# Patient Record
Sex: MALE | Race: WHITE | NOT HISPANIC OR LATINO | Employment: OTHER | URBAN - METROPOLITAN AREA
[De-identification: names, ages, dates, MRNs, and addresses within clinical notes are randomized per-mention and may not be internally consistent; named-entity substitution may affect disease eponyms.]

---

## 2019-10-18 ENCOUNTER — HOSPITAL ENCOUNTER (INPATIENT)
Facility: HOSPITAL | Age: 79
LOS: 4 days | Discharge: HOME | End: 2019-10-22
Attending: EMERGENCY MEDICINE | Admitting: HOSPITALIST

## 2019-10-18 ENCOUNTER — APPOINTMENT (OUTPATIENT)
Dept: GENERAL RADIOLOGY | Facility: HOSPITAL | Age: 79
End: 2019-10-18

## 2019-10-18 ENCOUNTER — APPOINTMENT (OUTPATIENT)
Dept: CT IMAGING | Facility: HOSPITAL | Age: 79
End: 2019-10-18

## 2019-10-18 DIAGNOSIS — I50.9 ACUTE CONGESTIVE HEART FAILURE, UNSPECIFIED HEART FAILURE TYPE (HCC): Primary | ICD-10-CM

## 2019-10-18 DIAGNOSIS — I47.29 VENTRICULAR TACHYCARDIA, NON-SUSTAINED (HCC): ICD-10-CM

## 2019-10-18 DIAGNOSIS — R10.30 LOWER ABDOMINAL PAIN: ICD-10-CM

## 2019-10-18 PROBLEM — I10 HTN (HYPERTENSION): Status: ACTIVE | Noted: 2019-10-18

## 2019-10-18 PROBLEM — E78.5 HLD (HYPERLIPIDEMIA): Status: ACTIVE | Noted: 2019-10-18

## 2019-10-18 PROBLEM — R73.03 PREDIABETES: Status: ACTIVE | Noted: 2019-10-18

## 2019-10-18 LAB
ALBUMIN SERPL-MCNC: 4.1 G/DL (ref 3.5–5.2)
ALBUMIN/GLOB SERPL: 1.9 G/DL
ALP SERPL-CCNC: 36 U/L (ref 39–117)
ALT SERPL W P-5'-P-CCNC: 31 U/L (ref 1–41)
ANION GAP SERPL CALCULATED.3IONS-SCNC: 16.8 MMOL/L (ref 5–15)
APTT PPP: 30.5 SECONDS (ref 22.7–35.4)
AST SERPL-CCNC: 32 U/L (ref 1–40)
BASOPHILS # BLD AUTO: 0.05 10*3/MM3 (ref 0–0.2)
BASOPHILS NFR BLD AUTO: 0.6 % (ref 0–1.5)
BILIRUB SERPL-MCNC: 1.1 MG/DL (ref 0.2–1.2)
BILIRUB UR QL STRIP: NEGATIVE
BUN BLD-MCNC: 14 MG/DL (ref 8–23)
BUN/CREAT SERPL: 15.2 (ref 7–25)
CALCIUM SPEC-SCNC: 9 MG/DL (ref 8.6–10.5)
CHLORIDE SERPL-SCNC: 101 MMOL/L (ref 98–107)
CLARITY UR: CLEAR
CO2 SERPL-SCNC: 21.2 MMOL/L (ref 22–29)
COLOR UR: YELLOW
CREAT BLD-MCNC: 0.92 MG/DL (ref 0.76–1.27)
D DIMER PPP FEU-MCNC: 1.15 MCGFEU/ML (ref 0–0.49)
DEPRECATED RDW RBC AUTO: 49 FL (ref 37–54)
EOSINOPHIL # BLD AUTO: 0.05 10*3/MM3 (ref 0–0.4)
EOSINOPHIL NFR BLD AUTO: 0.6 % (ref 0.3–6.2)
ERYTHROCYTE [DISTWIDTH] IN BLOOD BY AUTOMATED COUNT: 14.3 % (ref 12.3–15.4)
GFR SERPL CREATININE-BSD FRML MDRD: 79 ML/MIN/1.73
GLOBULIN UR ELPH-MCNC: 2.2 GM/DL
GLUCOSE BLD-MCNC: 128 MG/DL (ref 65–99)
GLUCOSE UR STRIP-MCNC: NEGATIVE MG/DL
HCT VFR BLD AUTO: 37.6 % (ref 37.5–51)
HGB BLD-MCNC: 12.5 G/DL (ref 13–17.7)
HGB UR QL STRIP.AUTO: NEGATIVE
HOLD SPECIMEN: NORMAL
HOLD SPECIMEN: NORMAL
IMM GRANULOCYTES # BLD AUTO: 0.03 10*3/MM3 (ref 0–0.05)
IMM GRANULOCYTES NFR BLD AUTO: 0.4 % (ref 0–0.5)
INR PPP: 1.25 (ref 0.9–1.1)
KETONES UR QL STRIP: NEGATIVE
LEUKOCYTE ESTERASE UR QL STRIP.AUTO: NEGATIVE
LIPASE SERPL-CCNC: 34 U/L (ref 13–60)
LYMPHOCYTES # BLD AUTO: 1.57 10*3/MM3 (ref 0.7–3.1)
LYMPHOCYTES NFR BLD AUTO: 19.8 % (ref 19.6–45.3)
MAGNESIUM SERPL-MCNC: 1.9 MG/DL (ref 1.6–2.4)
MCH RBC QN AUTO: 31.3 PG (ref 26.6–33)
MCHC RBC AUTO-ENTMCNC: 33.2 G/DL (ref 31.5–35.7)
MCV RBC AUTO: 94.2 FL (ref 79–97)
MONOCYTES # BLD AUTO: 0.6 10*3/MM3 (ref 0.1–0.9)
MONOCYTES NFR BLD AUTO: 7.6 % (ref 5–12)
NEUTROPHILS # BLD AUTO: 5.62 10*3/MM3 (ref 1.7–7)
NEUTROPHILS NFR BLD AUTO: 71 % (ref 42.7–76)
NITRITE UR QL STRIP: NEGATIVE
NRBC BLD AUTO-RTO: 0 /100 WBC (ref 0–0.2)
NT-PROBNP SERPL-MCNC: 3197 PG/ML (ref 5–1800)
PH UR STRIP.AUTO: <=5 [PH] (ref 5–8)
PLATELET # BLD AUTO: 254 10*3/MM3 (ref 140–450)
PMV BLD AUTO: 9.8 FL (ref 6–12)
POTASSIUM BLD-SCNC: 5.1 MMOL/L (ref 3.5–5.2)
PROT SERPL-MCNC: 6.3 G/DL (ref 6–8.5)
PROT UR QL STRIP: NEGATIVE
PROTHROMBIN TIME: 15.4 SECONDS (ref 11.7–14.2)
RBC # BLD AUTO: 3.99 10*6/MM3 (ref 4.14–5.8)
SODIUM BLD-SCNC: 139 MMOL/L (ref 136–145)
SP GR UR STRIP: >=1.03 (ref 1–1.03)
TROPONIN T SERPL-MCNC: <0.01 NG/ML (ref 0–0.03)
TSH SERPL DL<=0.05 MIU/L-ACNC: 4.53 UIU/ML (ref 0.27–4.2)
UROBILINOGEN UR QL STRIP: NORMAL
WBC NRBC COR # BLD: 7.92 10*3/MM3 (ref 3.4–10.8)
WHOLE BLOOD HOLD SPECIMEN: NORMAL
WHOLE BLOOD HOLD SPECIMEN: NORMAL

## 2019-10-18 PROCEDURE — 93005 ELECTROCARDIOGRAM TRACING: CPT | Performed by: PHYSICIAN ASSISTANT

## 2019-10-18 PROCEDURE — 93005 ELECTROCARDIOGRAM TRACING: CPT

## 2019-10-18 PROCEDURE — 85610 PROTHROMBIN TIME: CPT | Performed by: PHYSICIAN ASSISTANT

## 2019-10-18 PROCEDURE — 85730 THROMBOPLASTIN TIME PARTIAL: CPT | Performed by: PHYSICIAN ASSISTANT

## 2019-10-18 PROCEDURE — 0 IOPAMIDOL PER 1 ML: Performed by: EMERGENCY MEDICINE

## 2019-10-18 PROCEDURE — 93005 ELECTROCARDIOGRAM TRACING: CPT | Performed by: EMERGENCY MEDICINE

## 2019-10-18 PROCEDURE — 85379 FIBRIN DEGRADATION QUANT: CPT | Performed by: PHYSICIAN ASSISTANT

## 2019-10-18 PROCEDURE — 85025 COMPLETE CBC W/AUTO DIFF WBC: CPT | Performed by: PHYSICIAN ASSISTANT

## 2019-10-18 PROCEDURE — 71275 CT ANGIOGRAPHY CHEST: CPT

## 2019-10-18 PROCEDURE — 84443 ASSAY THYROID STIM HORMONE: CPT | Performed by: PHYSICIAN ASSISTANT

## 2019-10-18 PROCEDURE — 25010000002 AMIODARONE IN DEXTROSE 5% 150-4.21 MG/100ML-% SOLUTION: Performed by: PHYSICIAN ASSISTANT

## 2019-10-18 PROCEDURE — 84484 ASSAY OF TROPONIN QUANT: CPT | Performed by: PHYSICIAN ASSISTANT

## 2019-10-18 PROCEDURE — 25010000002 AMIODARONE IN DEXTROSE 5% 360-4.14 MG/200ML-% SOLUTION: Performed by: PHYSICIAN ASSISTANT

## 2019-10-18 PROCEDURE — 99284 EMERGENCY DEPT VISIT MOD MDM: CPT

## 2019-10-18 PROCEDURE — 71045 X-RAY EXAM CHEST 1 VIEW: CPT

## 2019-10-18 PROCEDURE — 83690 ASSAY OF LIPASE: CPT | Performed by: PHYSICIAN ASSISTANT

## 2019-10-18 PROCEDURE — 25010000002 MAGNESIUM SULFATE IN D5W 1G/100ML (PREMIX) 1-5 GM/100ML-% SOLUTION: Performed by: EMERGENCY MEDICINE

## 2019-10-18 PROCEDURE — 25010000002 FUROSEMIDE PER 20 MG: Performed by: PHYSICIAN ASSISTANT

## 2019-10-18 PROCEDURE — 80053 COMPREHEN METABOLIC PANEL: CPT | Performed by: PHYSICIAN ASSISTANT

## 2019-10-18 PROCEDURE — 74177 CT ABD & PELVIS W/CONTRAST: CPT

## 2019-10-18 PROCEDURE — 83880 ASSAY OF NATRIURETIC PEPTIDE: CPT | Performed by: PHYSICIAN ASSISTANT

## 2019-10-18 PROCEDURE — 81003 URINALYSIS AUTO W/O SCOPE: CPT | Performed by: PHYSICIAN ASSISTANT

## 2019-10-18 PROCEDURE — 83735 ASSAY OF MAGNESIUM: CPT | Performed by: PHYSICIAN ASSISTANT

## 2019-10-18 RX ORDER — OMEPRAZOLE 20 MG/1
20 TABLET, DELAYED RELEASE ORAL DAILY PRN
COMMUNITY
Start: 2019-10-01

## 2019-10-18 RX ORDER — LIDOCAINE HYDROCHLORIDE 20 MG/ML
15 SOLUTION OROPHARYNGEAL ONCE
Status: COMPLETED | OUTPATIENT
Start: 2019-10-18 | End: 2019-10-18

## 2019-10-18 RX ORDER — SODIUM CHLORIDE 0.9 % (FLUSH) 0.9 %
10 SYRINGE (ML) INJECTION EVERY 12 HOURS SCHEDULED
Status: DISCONTINUED | OUTPATIENT
Start: 2019-10-18 | End: 2019-10-19

## 2019-10-18 RX ORDER — ALUMINA, MAGNESIA, AND SIMETHICONE 2400; 2400; 240 MG/30ML; MG/30ML; MG/30ML
15 SUSPENSION ORAL ONCE
Status: COMPLETED | OUTPATIENT
Start: 2019-10-18 | End: 2019-10-18

## 2019-10-18 RX ORDER — BISACODYL 5 MG/1
5 TABLET, DELAYED RELEASE ORAL DAILY PRN
Status: DISCONTINUED | OUTPATIENT
Start: 2019-10-18 | End: 2019-10-22 | Stop reason: HOSPADM

## 2019-10-18 RX ORDER — SODIUM CHLORIDE 0.9 % (FLUSH) 0.9 %
10 SYRINGE (ML) INJECTION AS NEEDED
Status: DISCONTINUED | OUTPATIENT
Start: 2019-10-18 | End: 2019-10-19

## 2019-10-18 RX ORDER — ACETAMINOPHEN 650 MG/1
650 SUPPOSITORY RECTAL EVERY 4 HOURS PRN
Status: DISCONTINUED | OUTPATIENT
Start: 2019-10-18 | End: 2019-10-22 | Stop reason: HOSPADM

## 2019-10-18 RX ORDER — FUROSEMIDE 10 MG/ML
40 INJECTION INTRAMUSCULAR; INTRAVENOUS ONCE
Status: COMPLETED | OUTPATIENT
Start: 2019-10-18 | End: 2019-10-18

## 2019-10-18 RX ORDER — MAGNESIUM SULFATE 1 G/100ML
1 INJECTION INTRAVENOUS ONCE
Status: COMPLETED | OUTPATIENT
Start: 2019-10-18 | End: 2019-10-18

## 2019-10-18 RX ORDER — NITROGLYCERIN 0.4 MG/1
0.4 TABLET SUBLINGUAL
Status: DISCONTINUED | OUTPATIENT
Start: 2019-10-18 | End: 2019-10-22 | Stop reason: HOSPADM

## 2019-10-18 RX ORDER — BISACODYL 10 MG
10 SUPPOSITORY, RECTAL RECTAL DAILY PRN
Status: DISCONTINUED | OUTPATIENT
Start: 2019-10-18 | End: 2019-10-22 | Stop reason: HOSPADM

## 2019-10-18 RX ORDER — CALCIUM CARBONATE 200(500)MG
2 TABLET,CHEWABLE ORAL 2 TIMES DAILY PRN
Status: DISCONTINUED | OUTPATIENT
Start: 2019-10-18 | End: 2019-10-22 | Stop reason: HOSPADM

## 2019-10-18 RX ORDER — SODIUM CHLORIDE 0.9 % (FLUSH) 0.9 %
10 SYRINGE (ML) INJECTION AS NEEDED
Status: DISCONTINUED | OUTPATIENT
Start: 2019-10-18 | End: 2019-10-22 | Stop reason: HOSPADM

## 2019-10-18 RX ORDER — ACETAMINOPHEN 325 MG/1
650 TABLET ORAL EVERY 4 HOURS PRN
Status: DISCONTINUED | OUTPATIENT
Start: 2019-10-18 | End: 2019-10-22 | Stop reason: HOSPADM

## 2019-10-18 RX ORDER — ONDANSETRON 2 MG/ML
4 INJECTION INTRAMUSCULAR; INTRAVENOUS EVERY 6 HOURS PRN
Status: DISCONTINUED | OUTPATIENT
Start: 2019-10-18 | End: 2019-10-22 | Stop reason: HOSPADM

## 2019-10-18 RX ORDER — ONDANSETRON 4 MG/1
4 TABLET, FILM COATED ORAL EVERY 6 HOURS PRN
Status: DISCONTINUED | OUTPATIENT
Start: 2019-10-18 | End: 2019-10-22 | Stop reason: HOSPADM

## 2019-10-18 RX ORDER — ACETAMINOPHEN 160 MG/5ML
650 SOLUTION ORAL EVERY 4 HOURS PRN
Status: DISCONTINUED | OUTPATIENT
Start: 2019-10-18 | End: 2019-10-22 | Stop reason: HOSPADM

## 2019-10-18 RX ADMIN — IOPAMIDOL 95 ML: 755 INJECTION, SOLUTION INTRAVENOUS at 21:01

## 2019-10-18 RX ADMIN — LIDOCAINE HYDROCHLORIDE 15 ML: 20 SOLUTION ORAL; TOPICAL at 22:24

## 2019-10-18 RX ADMIN — ALUMINUM HYDROXIDE, MAGNESIUM HYDROXIDE, AND DIMETHICONE 15 ML: 400; 400; 40 SUSPENSION ORAL at 22:24

## 2019-10-18 RX ADMIN — AMIODARONE HYDROCHLORIDE 1 MG/MIN: 1.8 INJECTION, SOLUTION INTRAVENOUS at 19:58

## 2019-10-18 RX ADMIN — AMIODARONE HYDROCHLORIDE 150 MG: 1.5 INJECTION, SOLUTION INTRAVENOUS at 19:42

## 2019-10-18 RX ADMIN — MAGNESIUM SULFATE 1 G: 1 INJECTION INTRAVENOUS at 23:57

## 2019-10-18 RX ADMIN — FUROSEMIDE 40 MG: 20 INJECTION, SOLUTION INTRAMUSCULAR; INTRAVENOUS at 22:21

## 2019-10-18 NOTE — ED PROVIDER NOTES
"Pt is a 79 y.o. male who presents to the ED complaining of diffuse abd pain and SOA that started \"a few days ago.\" Pt denies any cp during these episodes.  Pt denies vomiting, diarrhea, fever, or hx of abd surgeries. Family at bedside states they just drove from [place] to [place] over the past 3 days. Pt denies hx of arrhythmias.         On exam,  Constitutional: A/Ox3, pale  HENT: pale conjunctiva, dry mucous membranes  Cardiovascular: regular rhythm, tachycardiac to the low 100s  Pulmonary: mild respiratory distress, rales bilaterally  Abdomen: periumbilical abd pain with guarding no rebound, soft, diminished bowel sounds  Musculoskeletal: 1+ pedal edema, no calf tenderness  Neurological: normal neuro exam      EKG    EKG time: 2004  Rhythm/Rate: NSR 98 BPM  Prolonged QT interval  No Acute Ischemia  Non-Specific ST-T changes    No prior EKG for comparison.     Interpreted Contemporaneously by me.  Independently viewed by me      Plan: Review lab work, CXR, CTA chest, and CT abd/pelvis. Pt had a run of nonsustained V-tach in ED amiodarone ordered.      2013-Ordered magnesium for prolonged Qt seen on EKG.     Patient's chest CT showed no pulmonary embolism, but some congestive heart failure and pleural effusions.  The patient's CT abdomen pelvis showed no acute disease.  The patient's ectopy has markedly decreased since being on the amiodarone drip, we will give him IV Lasix, will consult cardiology, and will admit to the hospital    MD ATTESTATION NOTE    The KATHLEEN and I have discussed this patient's history, physical exam, and treatment plan.  I have reviewed the documentation and personally had a face to face interaction with the patient. I affirm the documentation and agree with the treatment and plan.  The attached note describes my personal findings.      Documentation assistance provided by dahiana Poe for MD Melva. Information recorded by the dahiana was done at my direction and has been " verified and validated by me.             Margareth Poe  10/18/19 2013       Eloy Dong MD  10/18/19 4714

## 2019-10-18 NOTE — ED PROVIDER NOTES
"EMERGENCY DEPARTMENT ENCOUNTER    Room Number:  20/20  Date seen:  10/18/2019  Time seen: 7:13 PM  PCP: Provider, No Known    HPI:  Chief complaint: lower abd pain   Context:Augustine Chen is a 79 y.o. male who presents to the ED with c/o intermittent lower abd pain for about one week. It is nonradiating. Pt describes the pain as \"crampy\" with no exacerbating or relieving factors. Pt lives in [place] and drove to [place] to visit family. Pt confirms mild SOA and decreased appetite. Pt reports recently he has been unable to walk up the stairs w/o getting SOA. Pt denies chest pain, palpitations, BLE swelling, diarrhea, constipation, or any urinary sx. Pt currently takes Aspirin 81 mg. Pt is a borderline diabetic. Pt does not have a cardiologist. Hx of HTN and hyperlipidemia. Family is at bedside. There are no other complaints at this time.    Onset: gradual   Location: lower abd   Radiation: none   Duration: one week   Timing: intermittent   Character: \"crampy\"   Aggravating Factors: walking up the stairs   Alleviating Factors: none mentioned   Severity: moderate       ALLERGIES  Azithromycin    PAST MEDICAL HISTORY  Active Ambulatory Problems     Diagnosis Date Noted   • No Active Ambulatory Problems     Resolved Ambulatory Problems     Diagnosis Date Noted   • No Resolved Ambulatory Problems     Past Medical History:   Diagnosis Date   • Diabetes mellitus (CMS/HCC)    • Hyperlipidemia    • Hypertension        PAST SURGICAL HISTORY  History reviewed. No pertinent surgical history.    FAMILY HISTORY  History reviewed. No pertinent family history.    SOCIAL HISTORY  Social History     Socioeconomic History   • Marital status:      Spouse name: Not on file   • Number of children: Not on file   • Years of education: Not on file   • Highest education level: Not on file   Tobacco Use   • Smoking status: Never Smoker   • Smokeless tobacco: Never Used   Substance and Sexual Activity   • Alcohol use: No     " Frequency: Never   • Drug use: No   • Sexual activity: Defer       REVIEW OF SYSTEMS  Review of Systems   Constitutional: Positive for appetite change (decreased). Negative for chills and fever.   HENT: Negative.    Eyes: Negative.    Respiratory: Positive for shortness of breath (mild).    Cardiovascular: Negative for chest pain, palpitations and leg swelling.   Gastrointestinal: Positive for abdominal pain (lower). Negative for constipation and diarrhea.   Genitourinary: Negative.  Negative for difficulty urinating, dysuria and hematuria.   Musculoskeletal: Negative.    Skin: Negative.    Neurological: Negative.    Psychiatric/Behavioral: Negative.        PHYSICAL EXAM  ED Triage Vitals   Temp Heart Rate Resp BP SpO2   10/18/19 1807 10/18/19 1807 10/18/19 1807 10/18/19 1826 10/18/19 1807   96 °F (35.6 °C) 59 24 133/87 93 %      Temp src Heart Rate Source Patient Position BP Location FiO2 (%)   10/18/19 1907 -- -- -- --   Oral         Physical Exam   Constitutional: He is oriented to person, place, and time and well-developed, well-nourished, and in no distress.   HENT:   Head: Normocephalic and atraumatic.   Right Ear: External ear normal.   Left Ear: External ear normal.   Nose: Nose normal.   Eyes: Conjunctivae are normal.   Neck: Normal range of motion.   Cardiovascular: Normal rate. An irregularly irregular rhythm present.   Pulmonary/Chest: Effort normal and breath sounds normal. No respiratory distress.   Abdominal:   Normal bowel sounds  Soft  Nontender  Nondistended  No rebound, guarding, or rigidity  No appreciable organomegaly   Musculoskeletal: Normal range of motion.   No lower extremity edema.   Neurological: He is alert and oriented to person, place, and time.   Skin: Skin is warm and dry.   Psychiatric: Affect normal.   Nursing note and vitals reviewed.      LAB RESULTS  Recent Results (from the past 24 hour(s))   Light Blue Top    Collection Time: 10/18/19  7:11 PM   Result Value Ref Range    Extra  Tube hold for add-on    Green Top (Gel)    Collection Time: 10/18/19  7:11 PM   Result Value Ref Range    Extra Tube Hold for add-ons.    Lavender Top    Collection Time: 10/18/19  7:11 PM   Result Value Ref Range    Extra Tube hold for add-on    Gold Top - SST    Collection Time: 10/18/19  7:11 PM   Result Value Ref Range    Extra Tube Hold for add-ons.    Comprehensive Metabolic Panel    Collection Time: 10/18/19  7:11 PM   Result Value Ref Range    Glucose 128 (H) 65 - 99 mg/dL    BUN 14 8 - 23 mg/dL    Creatinine 0.92 0.76 - 1.27 mg/dL    Sodium 139 136 - 145 mmol/L    Potassium 5.1 3.5 - 5.2 mmol/L    Chloride 101 98 - 107 mmol/L    CO2 21.2 (L) 22.0 - 29.0 mmol/L    Calcium 9.0 8.6 - 10.5 mg/dL    Total Protein 6.3 6.0 - 8.5 g/dL    Albumin 4.10 3.50 - 5.20 g/dL    ALT (SGPT) 31 1 - 41 U/L    AST (SGOT) 32 1 - 40 U/L    Alkaline Phosphatase 36 (L) 39 - 117 U/L    Total Bilirubin 1.1 0.2 - 1.2 mg/dL    eGFR Non African Amer 79 >60 mL/min/1.73    Globulin 2.2 gm/dL    A/G Ratio 1.9 g/dL    BUN/Creatinine Ratio 15.2 7.0 - 25.0    Anion Gap 16.8 (H) 5.0 - 15.0 mmol/L   Troponin    Collection Time: 10/18/19  7:11 PM   Result Value Ref Range    Troponin T <0.010 0.000 - 0.030 ng/mL   Lipase    Collection Time: 10/18/19  7:11 PM   Result Value Ref Range    Lipase 34 13 - 60 U/L   BNP    Collection Time: 10/18/19  7:11 PM   Result Value Ref Range    proBNP 3,197.0 (H) 5.0-1,800.0 pg/mL   Magnesium    Collection Time: 10/18/19  7:11 PM   Result Value Ref Range    Magnesium 1.9 1.6 - 2.4 mg/dL   Protime-INR    Collection Time: 10/18/19  7:11 PM   Result Value Ref Range    Protime 15.4 (H) 11.7 - 14.2 Seconds    INR 1.25 (H) 0.90 - 1.10   aPTT    Collection Time: 10/18/19  7:11 PM   Result Value Ref Range    PTT 30.5 22.7 - 35.4 seconds   TSH    Collection Time: 10/18/19  7:11 PM   Result Value Ref Range    TSH 4.530 (H) 0.270 - 4.200 uIU/mL   D-dimer, Quantitative    Collection Time: 10/18/19  7:11 PM   Result Value Ref  Range    D-Dimer, Quantitative 1.15 (H) 0.00 - 0.49 MCGFEU/mL   CBC Auto Differential    Collection Time: 10/18/19  7:11 PM   Result Value Ref Range    WBC 7.92 3.40 - 10.80 10*3/mm3    RBC 3.99 (L) 4.14 - 5.80 10*6/mm3    Hemoglobin 12.5 (L) 13.0 - 17.7 g/dL    Hematocrit 37.6 37.5 - 51.0 %    MCV 94.2 79.0 - 97.0 fL    MCH 31.3 26.6 - 33.0 pg    MCHC 33.2 31.5 - 35.7 g/dL    RDW 14.3 12.3 - 15.4 %    RDW-SD 49.0 37.0 - 54.0 fl    MPV 9.8 6.0 - 12.0 fL    Platelets 254 140 - 450 10*3/mm3    Neutrophil % 71.0 42.7 - 76.0 %    Lymphocyte % 19.8 19.6 - 45.3 %    Monocyte % 7.6 5.0 - 12.0 %    Eosinophil % 0.6 0.3 - 6.2 %    Basophil % 0.6 0.0 - 1.5 %    Immature Grans % 0.4 0.0 - 0.5 %    Neutrophils, Absolute 5.62 1.70 - 7.00 10*3/mm3    Lymphocytes, Absolute 1.57 0.70 - 3.10 10*3/mm3    Monocytes, Absolute 0.60 0.10 - 0.90 10*3/mm3    Eosinophils, Absolute 0.05 0.00 - 0.40 10*3/mm3    Basophils, Absolute 0.05 0.00 - 0.20 10*3/mm3    Immature Grans, Absolute 0.03 0.00 - 0.05 10*3/mm3    nRBC 0.0 0.0 - 0.2 /100 WBC   Urinalysis With Microscopic If Indicated (No Culture) - Urine, Clean Catch    Collection Time: 10/18/19 10:15 PM   Result Value Ref Range    Color, UA Yellow Yellow, Straw    Appearance, UA Clear Clear    pH, UA <=5.0 5.0 - 8.0    Specific Gravity, UA >=1.030 1.005 - 1.030    Glucose, UA Negative Negative    Ketones, UA Negative Negative    Bilirubin, UA Negative Negative    Blood, UA Negative Negative    Protein, UA Negative Negative    Leuk Esterase, UA Negative Negative    Nitrite, UA Negative Negative    Urobilinogen, UA 0.2 E.U./dL 0.2 - 1.0 E.U./dL       I ordered the above labs and reviewed the results.    RADIOLOGY  CT Angiogram Chest   Preliminary Result   1. Minimal edema/CHF with tiny effusions, right greater than left.   2. No significant pulmonary embolism. The smaller branch vessels are not   yet opacified at time of image acquisition.                       CT SCANS ABDOMEN AND PELVIS WITH  IV CONTRAST.       HISTORY: Lower abdomen pain for one week       COMPARISON: None.       TECHNIQUE: Radiation dose reduction techniques were utilized, including   automated exposure control and exposure modulation based on body size.   Axial images were obtained from the lung bases to the symphysis pubis   with IV contrast only.. Oral contrast was not administered per request.       FINDINGS :  Fatty liver. Remaining solid organs have an unremarkable   appearance. Normal aorta and appendix. Extensive colonic diverticulosis.   The GI tract not opacified for assessment but non obstructive in   appearance. Mild prostate gland enlargement. There are bilateral L5 pars   defects           IMPRESSION :    1. Fatty liver.   2. Extensive diverticulosis.   3. Mild prostate gland enlargement.   4. Bilateral L5 pars defects                          CT Abdomen Pelvis With Contrast   Preliminary Result   1. Minimal edema/CHF with tiny effusions, right greater than left.   2. No significant pulmonary embolism. The smaller branch vessels are not   yet opacified at time of image acquisition.                       CT SCANS ABDOMEN AND PELVIS WITH IV CONTRAST.       HISTORY: Lower abdomen pain for one week       COMPARISON: None.       TECHNIQUE: Radiation dose reduction techniques were utilized, including   automated exposure control and exposure modulation based on body size.   Axial images were obtained from the lung bases to the symphysis pubis   with IV contrast only.. Oral contrast was not administered per request.       FINDINGS :  Fatty liver. Remaining solid organs have an unremarkable   appearance. Normal aorta and appendix. Extensive colonic diverticulosis.   The GI tract not opacified for assessment but non obstructive in   appearance. Mild prostate gland enlargement. There are bilateral L5 pars   defects           IMPRESSION :    1. Fatty liver.   2. Extensive diverticulosis.   3. Mild prostate gland enlargement.   4.  Bilateral L5 pars defects                          XR Chest 1 View   Final Result          I ordered the above noted radiological studies and reviewed the images on the PACS system.       MEDICATIONS GIVEN IN ER  Medications   sodium chloride 0.9 % flush 10 mL (not administered)   amiodarone in dextrose 5% (NEXTERONE) loading dose 150mg/100mL (0 mg Intravenous Stopped 10/18/19 1954)     Followed by   amiodarone (NEXTERONE) 360 mg/200 mL (1.8 mg/mL) infusion (1 mg/min Intravenous New Bag 10/18/19 1958)   magnesium sulfate in D5W 1g/100mL (PREMIX) (not administered)   iopamidol (ISOVUE-370) 76 % injection 100 mL (95 mL Intravenous Given by Other 10/18/19 2101)   furosemide (LASIX) injection 40 mg (40 mg Intravenous Given 10/18/19 2221)   aluminum-magnesium hydroxide-simethicone (MAALOX MAX) 400-400-40 MG/5ML suspension 15 mL (15 mL Oral Given 10/18/19 2224)   Lidocaine Viscous HCl (XYLOCAINE) 2 % mouth solution 15 mL (15 mL Mouth/Throat Given 10/18/19 2224)       EKG  Interpreted by ED Physician (Dr. Dong).    PROCEDURES  Procedures      PROGRESS AND CONSULTS    Progress Notes:         1925  Ordered CXR, troponin, CMP, BNP, UA, lipase, protime-INR, and magnesium for further evaluation.     1930  Reviewed pt's history and workup with Dr. Dong (ER physician). After a bedside evaluation; Dr. Dong agrees with the plan of care.    1932  Ordered Amiodarone. Ordered TSH, D-dimer, CT abd/pelbvis, and CTA chest for further evaluation.     1934  Ordered CBC for further evaluation.     2144  Placed a call to [facility] and ordered Lasix.     2150  Rechecked pt. Family is at bedside. Pt is resting who states he still has some mild abd pain. Notified pt of lab results: glucose= 128, lipase= 34, magnesium= 1.9, troponin= <0.010, BNP= 2,197.0, TSH= 4.530, D-dimer= 1.15, protime= 15.4, INR= 1.25, and HGB= 12.5. CXR revealed mild cardiomegaly. CTA of chest revealed minimal edema/CHF with tiny effusions, right greater than left, but no  significant pulmonary embolism. CT abd/pelvis revealed fatty liver, extensive diverticulosis, mild prostate gland enlargement, and bilateral L5 pars defects. Informed pt and family of early signs of CHF.  Discussed the plan to admit for further treatment. Pt understands and agrees with the plan, all questions answered.    2158  Discussed pt case with Dr. Zuniga ([facility]) who recommends discontinuing Amiodarone because pt's v-tach is unsustained and he agrees to consult would like hospitalist to admit.    2201  Placed a call to [facility].    2212  Ordered GI cocktail for abd pain.    2242  Discussed pt case with Dr. Tavarez ([facility]) who agrees to admit pt.     DIAGNOSIS  Final diagnoses:   Acute congestive heart failure, unspecified heart failure type (CMS/HCC)   Ventricular tachycardia, non-sustained (CMS/HCC)   Lower abdominal pain         Documentation assistance provided by dahiana Willoughby for Taya Corona PA-C.  Information recorded by the scribe was done at my direction and has been verified and validated by me.           Bekah Willoughby  10/18/19 5743       Taya Corona PA  10/18/19 2381

## 2019-10-19 ENCOUNTER — APPOINTMENT (OUTPATIENT)
Dept: CARDIOLOGY | Facility: HOSPITAL | Age: 79
End: 2019-10-19

## 2019-10-19 LAB
ANION GAP SERPL CALCULATED.3IONS-SCNC: 18.1 MMOL/L (ref 5–15)
AORTIC DIMENSIONLESS INDEX: 0.7 (DI)
BH CV ECHO MEAS - ACS: 1.4 CM
BH CV ECHO MEAS - AO MAX PG (FULL): 1.9 MMHG
BH CV ECHO MEAS - AO MAX PG: 3.5 MMHG
BH CV ECHO MEAS - AO MEAN PG (FULL): 1 MMHG
BH CV ECHO MEAS - AO MEAN PG: 2 MMHG
BH CV ECHO MEAS - AO ROOT AREA (BSA CORRECTED): 1.3
BH CV ECHO MEAS - AO ROOT AREA: 6.6 CM^2
BH CV ECHO MEAS - AO ROOT DIAM: 2.9 CM
BH CV ECHO MEAS - AO V2 MAX: 93.3 CM/SEC
BH CV ECHO MEAS - AO V2 MEAN: 63.4 CM/SEC
BH CV ECHO MEAS - AO V2 VTI: 16 CM
BH CV ECHO MEAS - AVA(I,A): 1.9 CM^2
BH CV ECHO MEAS - AVA(I,D): 1.9 CM^2
BH CV ECHO MEAS - AVA(V,A): 1.9 CM^2
BH CV ECHO MEAS - AVA(V,D): 1.9 CM^2
BH CV ECHO MEAS - BSA(HAYCOCK): 2.4 M^2
BH CV ECHO MEAS - BSA: 2.2 M^2
BH CV ECHO MEAS - BZI_BMI: 41.5 KILOGRAMS/M^2
BH CV ECHO MEAS - BZI_METRIC_HEIGHT: 167.6 CM
BH CV ECHO MEAS - BZI_METRIC_WEIGHT: 116.6 KG
BH CV ECHO MEAS - EDV(CUBED): 195.1 ML
BH CV ECHO MEAS - EDV(MOD-SP2): 186 ML
BH CV ECHO MEAS - EDV(MOD-SP4): 135 ML
BH CV ECHO MEAS - EDV(TEICH): 166.6 ML
BH CV ECHO MEAS - EF(CUBED): 43.3 %
BH CV ECHO MEAS - EF(MOD-BP): 23 %
BH CV ECHO MEAS - EF(MOD-SP2): 22.6 %
BH CV ECHO MEAS - EF(MOD-SP4): 25.9 %
BH CV ECHO MEAS - EF(TEICH): 35.4 %
BH CV ECHO MEAS - ESV(CUBED): 110.6 ML
BH CV ECHO MEAS - ESV(MOD-SP2): 144 ML
BH CV ECHO MEAS - ESV(MOD-SP4): 100 ML
BH CV ECHO MEAS - ESV(TEICH): 107.5 ML
BH CV ECHO MEAS - FS: 17.2 %
BH CV ECHO MEAS - IVS/LVPW: 1
BH CV ECHO MEAS - IVSD: 1 CM
BH CV ECHO MEAS - LAT PEAK E' VEL: 9 CM/SEC
BH CV ECHO MEAS - LV DIASTOLIC VOL/BSA (35-75): 60.7 ML/M^2
BH CV ECHO MEAS - LV MASS(C)D: 233.1 GRAMS
BH CV ECHO MEAS - LV MASS(C)DI: 104.8 GRAMS/M^2
BH CV ECHO MEAS - LV MAX PG: 1.6 MMHG
BH CV ECHO MEAS - LV MEAN PG: 1 MMHG
BH CV ECHO MEAS - LV SYSTOLIC VOL/BSA (12-30): 44.9 ML/M^2
BH CV ECHO MEAS - LV V1 MAX: 63.7 CM/SEC
BH CV ECHO MEAS - LV V1 MEAN: 42.2 CM/SEC
BH CV ECHO MEAS - LV V1 VTI: 10.9 CM
BH CV ECHO MEAS - LVIDD: 5.8 CM
BH CV ECHO MEAS - LVIDS: 4.8 CM
BH CV ECHO MEAS - LVLD AP2: 8.8 CM
BH CV ECHO MEAS - LVLD AP4: 8.3 CM
BH CV ECHO MEAS - LVLS AP2: 8.2 CM
BH CV ECHO MEAS - LVLS AP4: 7.4 CM
BH CV ECHO MEAS - LVOT AREA (M): 2.8 CM^2
BH CV ECHO MEAS - LVOT AREA: 2.8 CM^2
BH CV ECHO MEAS - LVOT DIAM: 1.9 CM
BH CV ECHO MEAS - LVPWD: 1 CM
BH CV ECHO MEAS - MED PEAK E' VEL: 6 CM/SEC
BH CV ECHO MEAS - MR MAX PG: 68.2 MMHG
BH CV ECHO MEAS - MR MAX VEL: 413 CM/SEC
BH CV ECHO MEAS - MV A DUR: 0.19 SEC
BH CV ECHO MEAS - MV A MAX VEL: 75.2 CM/SEC
BH CV ECHO MEAS - MV DEC SLOPE: 606 CM/SEC^2
BH CV ECHO MEAS - MV DEC TIME: 140 SEC
BH CV ECHO MEAS - MV E MAX VEL: 103 CM/SEC
BH CV ECHO MEAS - MV E/A: 1.4
BH CV ECHO MEAS - MV MAX PG: 4.9 MMHG
BH CV ECHO MEAS - MV MEAN PG: 2 MMHG
BH CV ECHO MEAS - MV P1/2T MAX VEL: 109 CM/SEC
BH CV ECHO MEAS - MV P1/2T: 52.7 MSEC
BH CV ECHO MEAS - MV V2 MAX: 111 CM/SEC
BH CV ECHO MEAS - MV V2 MEAN: 63.7 CM/SEC
BH CV ECHO MEAS - MV V2 VTI: 22.8 CM
BH CV ECHO MEAS - MVA P1/2T LCG: 2 CM^2
BH CV ECHO MEAS - MVA(P1/2T): 4.2 CM^2
BH CV ECHO MEAS - MVA(VTI): 1.4 CM^2
BH CV ECHO MEAS - PA ACC TIME: 0.05 SEC
BH CV ECHO MEAS - PA MAX PG (FULL): 0.37 MMHG
BH CV ECHO MEAS - PA MAX PG: 1 MMHG
BH CV ECHO MEAS - PA PR(ACCEL): 56.5 MMHG
BH CV ECHO MEAS - PA V2 MAX: 50.9 CM/SEC
BH CV ECHO MEAS - PULM A REVS DUR: 0.13 SEC
BH CV ECHO MEAS - PULM A REVS VEL: 33.8 CM/SEC
BH CV ECHO MEAS - PULM DIAS VEL: 38.4 CM/SEC
BH CV ECHO MEAS - PULM S/D: 0.56
BH CV ECHO MEAS - PULM SYS VEL: 21.5 CM/SEC
BH CV ECHO MEAS - PVA(V,A): 2.3 CM^2
BH CV ECHO MEAS - PVA(V,D): 2.3 CM^2
BH CV ECHO MEAS - QP/QS: 0.71
BH CV ECHO MEAS - RAP SYSTOLE: 8 MMHG
BH CV ECHO MEAS - RV MAX PG: 0.66 MMHG
BH CV ECHO MEAS - RV MEAN PG: 0 MMHG
BH CV ECHO MEAS - RV V1 MAX: 40.7 CM/SEC
BH CV ECHO MEAS - RV V1 MEAN: 27.1 CM/SEC
BH CV ECHO MEAS - RV V1 VTI: 7.7 CM
BH CV ECHO MEAS - RVOT AREA: 2.8 CM^2
BH CV ECHO MEAS - RVOT DIAM: 1.9 CM
BH CV ECHO MEAS - RVSP: 52 MMHG
BH CV ECHO MEAS - SI(AO): 47.5 ML/M^2
BH CV ECHO MEAS - SI(CUBED): 38 ML/M^2
BH CV ECHO MEAS - SI(LVOT): 13.9 ML/M^2
BH CV ECHO MEAS - SI(MOD-SP2): 18.9 ML/M^2
BH CV ECHO MEAS - SI(MOD-SP4): 15.7 ML/M^2
BH CV ECHO MEAS - SI(TEICH): 26.5 ML/M^2
BH CV ECHO MEAS - SV(AO): 105.7 ML
BH CV ECHO MEAS - SV(CUBED): 84.5 ML
BH CV ECHO MEAS - SV(LVOT): 30.9 ML
BH CV ECHO MEAS - SV(MOD-SP2): 42 ML
BH CV ECHO MEAS - SV(MOD-SP4): 35 ML
BH CV ECHO MEAS - SV(RVOT): 21.9 ML
BH CV ECHO MEAS - SV(TEICH): 59 ML
BH CV ECHO MEAS - TAPSE (>1.6): 1.4 CM2
BH CV ECHO MEAS - TR MAX VEL: 333 CM/SEC
BH CV ECHO MEASUREMENTS AVERAGE E/E' RATIO: 13.73
BH CV VAS BP RIGHT ARM: NORMAL MMHG
BH CV XLRA - RV BASE: 4.2 CM
BH CV XLRA - TDI S': 9 CM/SEC
BUN BLD-MCNC: 13 MG/DL (ref 8–23)
BUN/CREAT SERPL: 15.9 (ref 7–25)
CALCIUM SPEC-SCNC: 8.9 MG/DL (ref 8.6–10.5)
CHLORIDE SERPL-SCNC: 98 MMOL/L (ref 98–107)
CO2 SERPL-SCNC: 20.9 MMOL/L (ref 22–29)
CREAT BLD-MCNC: 0.82 MG/DL (ref 0.76–1.27)
DEPRECATED RDW RBC AUTO: 50.1 FL (ref 37–54)
ERYTHROCYTE [DISTWIDTH] IN BLOOD BY AUTOMATED COUNT: 14.6 % (ref 12.3–15.4)
GFR SERPL CREATININE-BSD FRML MDRD: 91 ML/MIN/1.73
GLUCOSE BLD-MCNC: 123 MG/DL (ref 65–99)
GLUCOSE BLDC GLUCOMTR-MCNC: 112 MG/DL (ref 70–130)
GLUCOSE BLDC GLUCOMTR-MCNC: 147 MG/DL (ref 70–130)
GLUCOSE BLDC GLUCOMTR-MCNC: 160 MG/DL (ref 70–130)
HBA1C MFR BLD: 6.2 % (ref 4.8–5.6)
HCT VFR BLD AUTO: 38.7 % (ref 37.5–51)
HGB BLD-MCNC: 12.7 G/DL (ref 13–17.7)
LEFT ATRIUM VOLUME INDEX: 48 ML/M2
LEFT ATRIUM VOLUME: 99 CM3
MAGNESIUM SERPL-MCNC: 2.3 MG/DL (ref 1.6–2.4)
MAXIMAL PREDICTED HEART RATE: 141 BPM
MCH RBC QN AUTO: 30.5 PG (ref 26.6–33)
MCHC RBC AUTO-ENTMCNC: 32.8 G/DL (ref 31.5–35.7)
MCV RBC AUTO: 92.8 FL (ref 79–97)
PHOSPHATE SERPL-MCNC: 4 MG/DL (ref 2.5–4.5)
PLATELET # BLD AUTO: 241 10*3/MM3 (ref 140–450)
PMV BLD AUTO: 9.8 FL (ref 6–12)
POTASSIUM BLD-SCNC: 4.4 MMOL/L (ref 3.5–5.2)
RBC # BLD AUTO: 4.17 10*6/MM3 (ref 4.14–5.8)
SODIUM BLD-SCNC: 137 MMOL/L (ref 136–145)
STRESS TARGET HR: 120 BPM
T4 FREE SERPL-MCNC: 1.61 NG/DL (ref 0.93–1.7)
TROPONIN T SERPL-MCNC: <0.01 NG/ML (ref 0–0.03)
WBC NRBC COR # BLD: 7.58 10*3/MM3 (ref 3.4–10.8)

## 2019-10-19 PROCEDURE — 84100 ASSAY OF PHOSPHORUS: CPT | Performed by: HOSPITALIST

## 2019-10-19 PROCEDURE — 83036 HEMOGLOBIN GLYCOSYLATED A1C: CPT | Performed by: HOSPITALIST

## 2019-10-19 PROCEDURE — 93306 TTE W/DOPPLER COMPLETE: CPT

## 2019-10-19 PROCEDURE — 82962 GLUCOSE BLOOD TEST: CPT

## 2019-10-19 PROCEDURE — 99222 1ST HOSP IP/OBS MODERATE 55: CPT | Performed by: INTERNAL MEDICINE

## 2019-10-19 PROCEDURE — 25010000002 PERFLUTREN (DEFINITY) 8.476 MG IN SODIUM CHLORIDE 0.9 % 10 ML INJECTION: Performed by: HOSPITALIST

## 2019-10-19 PROCEDURE — 80048 BASIC METABOLIC PNL TOTAL CA: CPT | Performed by: HOSPITALIST

## 2019-10-19 PROCEDURE — 83735 ASSAY OF MAGNESIUM: CPT | Performed by: HOSPITALIST

## 2019-10-19 PROCEDURE — 85027 COMPLETE CBC AUTOMATED: CPT | Performed by: HOSPITALIST

## 2019-10-19 PROCEDURE — 93306 TTE W/DOPPLER COMPLETE: CPT | Performed by: INTERNAL MEDICINE

## 2019-10-19 PROCEDURE — 36415 COLL VENOUS BLD VENIPUNCTURE: CPT | Performed by: HOSPITALIST

## 2019-10-19 PROCEDURE — 84439 ASSAY OF FREE THYROXINE: CPT | Performed by: HOSPITALIST

## 2019-10-19 PROCEDURE — 25010000002 ENOXAPARIN PER 10 MG: Performed by: HOSPITALIST

## 2019-10-19 PROCEDURE — 84484 ASSAY OF TROPONIN QUANT: CPT | Performed by: HOSPITALIST

## 2019-10-19 RX ORDER — ATORVASTATIN CALCIUM 10 MG/1
10 TABLET, FILM COATED ORAL DAILY
Status: DISCONTINUED | OUTPATIENT
Start: 2019-10-19 | End: 2019-10-19

## 2019-10-19 RX ORDER — PANTOPRAZOLE SODIUM 40 MG/1
40 TABLET, DELAYED RELEASE ORAL EVERY MORNING
Status: DISCONTINUED | OUTPATIENT
Start: 2019-10-19 | End: 2019-10-22 | Stop reason: HOSPADM

## 2019-10-19 RX ORDER — ATORVASTATIN CALCIUM 10 MG/1
10 TABLET, FILM COATED ORAL NIGHTLY
Status: DISCONTINUED | OUTPATIENT
Start: 2019-10-19 | End: 2019-10-22 | Stop reason: HOSPADM

## 2019-10-19 RX ORDER — LISINOPRIL 20 MG/1
20 TABLET ORAL
Status: DISCONTINUED | OUTPATIENT
Start: 2019-10-19 | End: 2019-10-22 | Stop reason: HOSPADM

## 2019-10-19 RX ORDER — ASPIRIN 81 MG/1
81 TABLET, CHEWABLE ORAL DAILY
Status: DISCONTINUED | OUTPATIENT
Start: 2019-10-19 | End: 2019-10-22 | Stop reason: HOSPADM

## 2019-10-19 RX ADMIN — LISINOPRIL 20 MG: 20 TABLET ORAL at 09:17

## 2019-10-19 RX ADMIN — ASPIRIN 81 MG: 81 TABLET, CHEWABLE ORAL at 09:17

## 2019-10-19 RX ADMIN — SODIUM CHLORIDE, PRESERVATIVE FREE 10 ML: 5 INJECTION INTRAVENOUS at 00:42

## 2019-10-19 RX ADMIN — SODIUM CHLORIDE, PRESERVATIVE FREE 10 ML: 5 INJECTION INTRAVENOUS at 20:10

## 2019-10-19 RX ADMIN — ENOXAPARIN SODIUM 40 MG: 40 INJECTION SUBCUTANEOUS at 22:39

## 2019-10-19 RX ADMIN — SODIUM CHLORIDE, PRESERVATIVE FREE 10 ML: 5 INJECTION INTRAVENOUS at 09:17

## 2019-10-19 RX ADMIN — ENOXAPARIN SODIUM 40 MG: 40 INJECTION SUBCUTANEOUS at 00:41

## 2019-10-19 RX ADMIN — METOPROLOL TARTRATE 25 MG: 25 TABLET ORAL at 01:07

## 2019-10-19 RX ADMIN — PANTOPRAZOLE SODIUM 40 MG: 40 TABLET, DELAYED RELEASE ORAL at 06:01

## 2019-10-19 RX ADMIN — ATORVASTATIN CALCIUM 10 MG: 10 TABLET, FILM COATED ORAL at 20:10

## 2019-10-19 RX ADMIN — METOPROLOL TARTRATE 25 MG: 25 TABLET ORAL at 20:10

## 2019-10-19 RX ADMIN — METOPROLOL TARTRATE 25 MG: 25 TABLET ORAL at 09:17

## 2019-10-19 RX ADMIN — PERFLUTREN 2 ML: 6.52 INJECTION, SUSPENSION INTRAVENOUS at 14:34

## 2019-10-19 NOTE — PLAN OF CARE
Problem: Patient Care Overview  Goal: Plan of Care Review  Outcome: Ongoing (interventions implemented as appropriate)   10/19/19 0109   Coping/Psychosocial   Plan of Care Reviewed With patient   Plan of Care Review   Progress no change   OTHER   Outcome Summary Pt admit for CHF exacerbation and Abdomen pain, Pt denies pain at this time, Pt AO VSS on RA, Diuresing on Lasix, Spouse at bedside,           yes

## 2019-10-19 NOTE — PLAN OF CARE
Problem: Patient Care Overview  Goal: Plan of Care Review  Outcome: Ongoing (interventions implemented as appropriate)   10/19/19 1820   Coping/Psychosocial   Plan of Care Reviewed With patient   Plan of Care Review   Progress improving   OTHER   Outcome Summary Pt without chest or abd pain this shift. No nausea, VSS, on RA, States relief from SOA symptoms that brought him in, Echo complete with low EF. Plan for PCI on Monday      Goal: Individualization and Mutuality  Outcome: Ongoing (interventions implemented as appropriate)    Goal: Discharge Needs Assessment  Outcome: Ongoing (interventions implemented as appropriate)    Goal: Interprofessional Rounds/Family Conf  Outcome: Ongoing (interventions implemented as appropriate)      Problem: Cardiac: Heart Failure (Adult)  Goal: Signs and Symptoms of Listed Potential Problems Will be Absent, Minimized or Managed (Cardiac: Heart Failure)  Outcome: Ongoing (interventions implemented as appropriate)      Problem: Pain, Acute (Adult)  Goal: Identify Related Risk Factors and Signs and Symptoms  Outcome: Outcome(s) achieved Date Met: 10/19/19    Goal: Acceptable Pain Control/Comfort Level  Outcome: Outcome(s) achieved Date Met: 10/19/19      Problem: Arrhythmia/Dysrhythmia (Symptomatic) (Adult)  Goal: Signs and Symptoms of Listed Potential Problems Will be Absent, Minimized or Managed (Arrhythmia/Dysrhythmia)  Outcome: Ongoing (interventions implemented as appropriate)      Problem: Fluid Volume Excess (Adult)  Goal: Identify Related Risk Factors and Signs and Symptoms  Outcome: Outcome(s) achieved Date Met: 10/19/19    Goal: Optimal Fluid Balance  Outcome: Ongoing (interventions implemented as appropriate)      Problem: Fall Risk (Adult)  Goal: Identify Related Risk Factors and Signs and Symptoms  Outcome: Outcome(s) achieved Date Met: 10/19/19    Goal: Absence of Fall  Outcome: Ongoing (interventions implemented as appropriate)

## 2019-10-19 NOTE — PROGRESS NOTES
[facility]     LOS: 1 day     Subjective:    CC:Abdominal Pain (sent from [facility], from [facility], started 2 weeks ago) and Shortness of Breath (started before coming from OOT)    DIET:  Diet Order   Procedures   • Diet Regular; Consistent Carbohydrate, Cardiac     No cp  Mild soa  No n/v/d  Eating well    Objective:    Vital Signs:  Temp:  [97.4 °F (36.3 °C)-98.3 °F (36.8 °C)] 98 °F (36.7 °C)  Heart Rate:  [] 77  Resp:  [18-26] 18  BP: (121-149)/() 149/91    SpO2:  [92 %-100 %] 92 %  on  Flow (L/min):  [2] 2;   Device (Oxygen Therapy): room air  Body mass index is 41.48 kg/m².    Physical Exam   Constitutional: He appears well-developed and well-nourished.   HENT:   Head: Normocephalic and atraumatic.   Cardiovascular: Exam reveals no friction rub.   No murmur heard.  Pulmonary/Chest: Effort normal and breath sounds normal.   Abdominal: Soft. Bowel sounds are normal. He exhibits distension ( from obesity). There is no tenderness.   Musculoskeletal: He exhibits no edema.   Neurological: He is alert.   Skin: Skin is warm and dry.       Results Review:    Glucose   Date Value Ref Range Status   10/19/2019 123 (H) 65 - 99 mg/dL Final   10/18/2019 128 (H) 65 - 99 mg/dL Final     Results from last 7 days   Lab Units 10/19/19  0320   WBC 10*3/mm3 7.58   HEMOGLOBIN g/dL 12.7*   HEMATOCRIT % 38.7   PLATELETS 10*3/mm3 241     Results from last 7 days   Lab Units 10/19/19  0320 10/18/19  1911   SODIUM mmol/L 137 139   POTASSIUM mmol/L 4.4 5.1   CHLORIDE mmol/L 98 101   CO2 mmol/L 20.9* 21.2*   BUN mg/dL 13 14   CREATININE mg/dL 0.82 0.92   CALCIUM mg/dL 8.9 9.0   BILIRUBIN mg/dL  --  1.1   ALK PHOS U/L  --  36*   ALT (SGPT) U/L  --  31   AST (SGOT) U/L  --  32   GLUCOSE mg/dL 123* 128*     Results from last 7 days   Lab Units 10/18/19  1911   INR  1.25*   APTT seconds 30.5     Results from last 7 days   Lab Units 10/19/19  0320   MAGNESIUM mg/dL 2.3     Results from last 7 days   Lab Units  10/19/19  0320 10/18/19  1911   TROPONIN T ng/mL <0.010 <0.010     Cultures:       I have reviewed daily medications and changes in CPOE    Scheduled meds    aspirin 81 mg Oral Daily   atorvastatin 10 mg Oral Nightly   enoxaparin 40 mg Subcutaneous Q24H   lisinopril 20 mg Oral Q24H   metoprolol tartrate 25 mg Oral Q12H   pantoprazole 40 mg Oral QAM          PRN meds  •  acetaminophen **OR** acetaminophen **OR** acetaminophen  •  bisacodyl  •  bisacodyl  •  calcium carbonate  •  nitroglycerin  •  ondansetron **OR** ondansetron  •  [COMPLETED] Insert peripheral IV **AND** sodium chloride        CHF (congestive heart failure) (CMS/HCC)    Prediabetes    NSVT (nonsustained ventricular tachycardia) (CMS/[facility])    HTN (hypertension)    HLD (hyperlipidemia)        Assessment/Plan:    1.  Acute heart failure:   -Diuresis, patient is feeling better, patient was hoping to go home but ejection fraction appears to be low cardiology like to do further evaluation    2.  Nonsustained ventricular tachycardia: Stable on monitor currently    3.  Prediabetes: Last A1c was 6.3.    -Monitor    4.  Abdominal pain:  -CT scan of the abdomen is unremarkable    DVT PPX: scd and lovenox      Juan Francisco Franks MD  10/19/19  6:53 PM

## 2019-10-19 NOTE — CONSULTS
Patient Name: Augustine Chen  Age/Sex: 79 y.o. male  : 1940  MRN: [id number]    Date of Admission: 10/18/2019  Date of Encounter Visit: 10/19/19  Encounter Provider: Nirav Zuniga MD  Place of Service: [facility]      Referring Provider: Juan Francisco Tavarez MD  Patient Care Team:  Provider, No Known as PCP - General    Subjective:       Chief Complaint:   Abdominal pain    History of Present Illness:  Augustine Chen is a 79 y.o. male admitted with complaint of abdominal pain, and dyspnea on exertion.  Patient is visiting son from [facility] [place].  Over the last few days he has noticed lower abdominal pain.  Pain is dull and intermittent.  He describes it as indigestion.  He denies any palpitations, tachycardia.  Says he is noticed increased dyspnea on exertion, particularly when climbing stairs.  The patient's son has a large staircase in his house.  Patient does not commonly climb stairs.  He denies any history of cardiovascular issues.  He reports a remote history of palpitations, but this was many years ago.  He is treated for hypertension and diabetes.   Nonsustained tachycardia was observed in the emergency room, 6-10 beats.          Past Medical History:  Past Medical History:   Diagnosis Date   • Diabetes mellitus (CMS/[facility])    • Hyperlipidemia    • Hypertension        History reviewed. No pertinent surgical history.    Home Medications:   Medications Prior to Admission   Medication Sig Dispense Refill Last Dose   • aspirin 81 MG chewable tablet Chew 81 mg.      • lisinopril (PRINIVIL,ZESTRIL) 20 MG tablet Take 20 mg by mouth every night at bedtime.  1    • metFORMIN (GLUCOPHAGE) 500 MG tablet Take 500 mg by mouth 2 (Two) Times a Day With Meals.  3    • metoprolol tartrate (LOPRESSOR) 25 MG tablet Take 25 mg by mouth 2 (Two) Times a Day.      • omeprazole OTC (PRILOSEC OTC) 20 MG EC tablet Take 20 mg by mouth Daily.      • simvastatin (ZOCOR) 20 MG tablet  Take 20 mg by mouth Every Evening.  3        Allergies:  Allergies   Allergen Reactions   • Azithromycin Swelling       Past Social History:  Social History     Socioeconomic History   • Marital status:      Spouse name: Not on file   • Number of children: Not on file   • Years of education: Not on file   • Highest education level: Not on file   Tobacco Use   • Smoking status: Never Smoker   • Smokeless tobacco: Never Used   Substance and Sexual Activity   • Alcohol use: No     Frequency: Never   • Drug use: No   • Sexual activity: Defer        Past Family History:  History reviewed. No pertinent family history.    Review of Systems: All systems reviewed. Pertinent positives identified in HPI. All other systems are negative.     Review of Systems   Constitution: Negative for weakness and malaise/fatigue.   HENT: Negative.    Eyes: Negative.    Cardiovascular: Positive for dyspnea on exertion. Negative for chest pain, leg swelling and near-syncope.   Respiratory: Negative for cough and shortness of breath.    Endocrine: Negative.    Hematologic/Lymphatic: Negative.    Skin: Negative.    Musculoskeletal: Negative.    Gastrointestinal: Positive for abdominal pain.   Genitourinary: Negative.    Neurological: Negative.    Psychiatric/Behavioral: Negative.    Allergic/Immunologic: Negative.          Objective:     Objective:  Temp:  [96 °F (35.6 °C)-98.3 °F (36.8 °C)] 98.3 °F (36.8 °C)  Heart Rate:  [] 77  Resp:  [18-26] 18  BP: (121-147)/() 139/95    Intake/Output Summary (Last 24 hours) at 10/19/2019 0911  Last data filed at 10/19/2019 0300  Gross per 24 hour   Intake 100 ml   Output 1150 ml   Net -1050 ml     Body mass index is 41.6 kg/m².      10/18/19  1907 10/19/19  0023 10/19/19  0518   Weight: 109 kg (240 lb) 110 kg (242 lb 1 oz) 117 kg (257 lb 11.5 oz)           Physical Exam:   Physical Exam   Constitutional: He is oriented to person, place, and time. He appears well-developed. No distress.    Morbidly obese   HENT:   Head: Normocephalic and atraumatic.   Eyes: Conjunctivae are normal. Right eye exhibits no discharge. Left eye exhibits no discharge. No scleral icterus.   Neck: No JVD present. No thyromegaly present.   Cardiovascular: Normal rate and normal heart sounds.   Pulmonary/Chest: Effort normal and breath sounds normal. No respiratory distress.   Abdominal: Soft. He exhibits no distension. There is no tenderness.   Musculoskeletal: He exhibits no edema or deformity.   Neurological: He is alert and oriented to person, place, and time.   Skin: Skin is warm and dry. He is not diaphoretic.   Psychiatric: He has a normal mood and affect. His behavior is normal. Judgment and thought content normal.   Nursing note and vitals reviewed.        Lab Review:     Results from last 7 days   Lab Units 10/19/19  0320 10/18/19  1911   SODIUM mmol/L 137 139   POTASSIUM mmol/L 4.4 5.1   CHLORIDE mmol/L 98 101   CO2 mmol/L 20.9* 21.2*   BUN mg/dL 13 14   CREATININE mg/dL 0.82 0.92   GLUCOSE mg/dL 123* 128*   CALCIUM mg/dL 8.9 9.0       Results from last 7 days   Lab Units 10/19/19  0320 10/18/19  1911   TROPONIN T ng/mL <0.010 <0.010     Results from last 7 days   Lab Units 10/19/19  0320   WBC 10*3/mm3 7.58   HEMOGLOBIN g/dL 12.7*   HEMATOCRIT % 38.7   PLATELETS 10*3/mm3 241     Results from last 7 days   Lab Units 10/18/19  1911   INR  1.25*   APTT seconds 30.5         Results from last 7 days   Lab Units 10/19/19  0320   MAGNESIUM mg/dL 2.3         Results from last 7 days   Lab Units 10/18/19  1911   PROBNP pg/mL 3,197.0*         Results from last 7 days   Lab Units 10/18/19  1911   TSH uIU/mL 4.530*       Imaging:    Imaging Results (most recent)     Procedure Component Value Units Date/Time    CT Abdomen Pelvis With Contrast [[id number]] Collected:  10/18/19 2121     Updated:  10/19/19 0357    Narrative:       CT ANGIOGRAM THORAX WITH CONTRAST, PULMONARY EMBOLISM PROTOCOL     HISTORY: Pulmonary embolism.      COMPARISON: None.     TECHNIQUE: Radiation dose reduction techniques were utilized, including  automated exposure control and exposure modulation based on body size.  Axial contrast-enhanced images of the chest were obtained according to  the pulmonary embolism protocol. Coronal oblique 3-D MIP reformatted  images were supplemented and reviewed.  100 mls of non ionic contrast  was utilized intravenously.     FINDINGS CHEST CT: Minimal interstitial prominence and intralobular  septal thickening consistent with very mild edema. There are small  pleural effusions, right greater than left. There is cardiomegaly. At  time of image acquisition, the contrast is just extending into the  smaller branch vessels of the pulmonary arterial circulation. The main  pulmonary arteries and larger branch vessels are well opacified and a  significant pulmonary embolus is not appreciated. The smaller branch  vessels cannot be completely assessed. Aorta nonaneurysmal.       Impression:       1. Minimal edema/CHF with tiny effusions, right greater than left.  2. No significant pulmonary embolism. The smaller branch vessels are not  yet opacified at time of image acquisition.                 CT SCANS ABDOMEN AND PELVIS WITH IV CONTRAST.     HISTORY: Lower abdomen pain for one week     COMPARISON: None.     TECHNIQUE: Radiation dose reduction techniques were utilized, including  automated exposure control and exposure modulation based on body size.  Axial images were obtained from the lung bases to the symphysis pubis  with IV contrast only.. Oral contrast was not administered per request.     FINDINGS :  Fatty liver. Remaining solid organs have an unremarkable  appearance. Normal aorta and appendix. Extensive colonic diverticulosis.  The GI tract not opacified for assessment but non obstructive in  appearance. Mild prostate gland enlargement. There are bilateral L5 pars  defects        IMPRESSION :   1. Fatty liver.  2. Extensive  diverticulosis.  3. Mild prostate gland enlargement.  4. Bilateral L5 pars defects           This report was finalized on 10/19/2019 3:54 AM by Satinder Blue M.D.       CT Angiogram Chest [[id number]] Collected:  10/18/19 2121     Updated:  10/19/19 0357    Narrative:       CT ANGIOGRAM THORAX WITH CONTRAST, PULMONARY EMBOLISM PROTOCOL     HISTORY: Pulmonary embolism.     COMPARISON: None.     TECHNIQUE: Radiation dose reduction techniques were utilized, including  automated exposure control and exposure modulation based on body size.  Axial contrast-enhanced images of the chest were obtained according to  the pulmonary embolism protocol. Coronal oblique 3-D MIP reformatted  images were supplemented and reviewed.  100 mls of non ionic contrast  was utilized intravenously.     FINDINGS CHEST CT: Minimal interstitial prominence and intralobular  septal thickening consistent with very mild edema. There are small  pleural effusions, right greater than left. There is cardiomegaly. At  time of image acquisition, the contrast is just extending into the  smaller branch vessels of the pulmonary arterial circulation. The main  pulmonary arteries and larger branch vessels are well opacified and a  significant pulmonary embolus is not appreciated. The smaller branch  vessels cannot be completely assessed. Aorta nonaneurysmal.       Impression:       1. Minimal edema/CHF with tiny effusions, right greater than left.  2. No significant pulmonary embolism. The smaller branch vessels are not  yet opacified at time of image acquisition.                 CT SCANS ABDOMEN AND PELVIS WITH IV CONTRAST.     HISTORY: Lower abdomen pain for one week     COMPARISON: None.     TECHNIQUE: Radiation dose reduction techniques were utilized, including  automated exposure control and exposure modulation based on body size.  Axial images were obtained from the lung bases to the symphysis pubis  with IV contrast only.. Oral contrast was not  administered per request.     FINDINGS :  Fatty liver. Remaining solid organs have an unremarkable  appearance. Normal aorta and appendix. Extensive colonic diverticulosis.  The GI tract not opacified for assessment but non obstructive in  appearance. Mild prostate gland enlargement. There are bilateral L5 pars  defects        IMPRESSION :   1. Fatty liver.  2. Extensive diverticulosis.  3. Mild prostate gland enlargement.  4. Bilateral L5 pars defects           This report was finalized on 10/19/2019 3:54 AM by Satinder Blue M.D.       XR Chest 1 View [[id number]] Collected:  10/18/19 1953     Updated:  10/18/19 2024    Narrative:       ONE VIEW PORTABLE CHEST     HISTORY: Shortness of breath.     FINDINGS: The lungs are well-expanded and clear. There is mild  cardiomegaly but no overt CHF or focal infiltrate.     This report was finalized on 10/18/2019 8:21 PM by Dr. Anthony Vivas M.D.             EKG:   His initial EKG on presentation is regular.  There does appear to be atrial activity, but there is a lot of noise in the baseline.  I cannot totally exclude atrial fibrillation with controlled ventricular response.  His second EKG demonstrates sinus rhythm.      Baseline:     I personally viewed and interpreted the patient's EKG/Telemetry tracings.    Assessment:   Assessment/Plan     1.  Shortness of breath  2.  Abdominal pain    Plan:     1.  Shortness of breath    Unclear etiology.  BNP elevated, no clear heart failure.  No edema on exam this morning, but did receive Lasix last night.  Occasional PVCs and nonsustained tachycardia observed.  Nonsustained tachycardia described as ventricular tachycardia, but no clear reasoning for this.  Other etiologies of course would include apparent conduction of the nonsustained SVT.  He did receive a dose of amiodarone.  This was not recommended by cardiology.  Recommend continuing beta-blocker.  We can do an echocardiogram.  If normal,recommend follow-up with provider  near his home, consideration for Holter monitoring at their discretion.  Do not recommend starting anticoagulation at this time.  We will continue his beta-blocker.  Possible shortness of breath is deconditioning.  He does not commonly climb stairs or engage in much activity at home.  He has been more active here, climbing stairs etc.  Possibly a small contribution from volume overload, as he has had increased dietary salt intake.    2.  Abdominal pain    Now resolved.  CAT scan without evidence of acute issue.    Following echocardiogram, likely discharge.    Thank you for allowing me to participate in the care of Augustine Chen. Feel free to contact me directly with any further questions or concerns.    Nirav Zuniga MD  10/19/19  9:11 AM

## 2019-10-19 NOTE — H&P
Name: Augustine Sample ADMIT: 10/18/2019   : 1940  PCP: Provider, No Known    MRN: [id number] LOS: 0 days   AGE/SEX: 79 y.o. male  ROOM:      Chief Complaint   Patient presents with   • Abdominal Pain     sent from [facility], from [facility], started 2 weeks ago   • Shortness of Breath     started before coming from [facility]       Subjective   Patient is a 79 y.o. male who presents to [facility] with the above chief complaint.  This gentlemen's and from out of town.  He is visiting his son from [facility].  Over the last 48 hours she had increasing shortness of breath.  This was his main reason for coming to the hospital.  He is noticed that the shortness of breath is been worse with exertion.  He is also describing some orthopnea but denies any paroxysmal nocturnal dyspnea.  He is noticed that his legs have been swelling more than usual.  They did drive across the country to visit here.  He has been eating more fast food than normal and really has not been keeping up with his diet as he regularly does at home.  He has no history of any heart failure or cardiac abnormalities his only medical problem is prediabetes.  He also describes some vague nondescript abdominal pain.  When you ask him where it said he kind of points with his hand in circles his entire belly.  Even when you ask him to point with one finger where it hurts the most he is unable to really do this.  The pain is a cramping abdominal pain that tends to come and go.  There is no aggravating or relieving factors that he is aware of.  He has no idea what brings it on or what makes it go away.  In the emergency room he was evaluated and was noted to have some issues on telemetry.  He had a few runs of nonsustained ventricular tachycardia and cardiology was consulted.  They do not feel comfortable admitting him to the hospital so our service was called to evaluate and manage his other medical issues over the course of this day.    History of  Present Illness    Past Medical History:   Diagnosis Date   • Diabetes mellitus (CMS/HCC)    • Hyperlipidemia    • Hypertension      History reviewed. No pertinent surgical history.  History reviewed. No pertinent family history.  Social History     Tobacco Use   • Smoking status: Never Smoker   • Smokeless tobacco: Never Used   Substance Use Topics   • Alcohol use: No     Frequency: Never   • Drug use: No       (Not in a hospital admission)  Allergies:  Azithromycin    Review of Systems   Constitutional: Positive for fatigue and unexpected weight change. Negative for chills, diaphoresis and fever.   HENT: Negative for congestion, nosebleeds and sore throat.    Eyes: Negative for photophobia, redness and visual disturbance.   Respiratory: Negative for cough, shortness of breath and wheezing.    Cardiovascular: Positive for leg swelling. Negative for chest pain.   Gastrointestinal: Positive for abdominal distention and abdominal pain. Negative for blood in stool, constipation, diarrhea, nausea and vomiting.   Endocrine: Negative for polydipsia, polyphagia and polyuria.   Genitourinary: Negative for difficulty urinating, hematuria and urgency.   Musculoskeletal: Negative for back pain, joint swelling and neck stiffness.   Skin: Negative for pallor and rash.   Allergic/Immunologic: Negative for environmental allergies and immunocompromised state.   Neurological: Positive for weakness. Negative for dizziness, light-headedness, numbness and headaches.   Hematological: Negative for adenopathy. Does not bruise/bleed easily.   Psychiatric/Behavioral: Negative for agitation and behavioral problems.        Objective    Vital Signs  Temp:  [96 °F (35.6 °C)-97.4 °F (36.3 °C)] 97.4 °F (36.3 °C)  Heart Rate:  [] 95  Resp:  [24-26] 26  BP: (121-147)/() 131/90  SpO2:  [93 %-98 %] 95 %  on   ;   Device (Oxygen Therapy): room air  Body mass index is 38.74 kg/m².    Physical Exam   Constitutional: He is oriented to person,  place, and time. He appears well-developed and well-nourished.   HENT:   Head: Normocephalic and atraumatic.   Eyes: EOM are normal. Pupils are equal, round, and reactive to light.   Neck: Neck supple.   Cardiovascular: Normal rate, regular rhythm and normal heart sounds.   Pulmonary/Chest: Effort normal and breath sounds normal.   Abdominal: Soft. Bowel sounds are normal. He exhibits distension. There is no tenderness. There is no guarding.   Musculoskeletal: Normal range of motion. He exhibits edema.   Neurological: He is alert and oriented to person, place, and time.   Skin: Skin is warm and dry. Capillary refill takes less than 2 seconds.   Psychiatric: He has a normal mood and affect. His behavior is normal.   Nursing note and vitals reviewed.      Results Review:   I reviewed the patient's new clinical results.  Results from last 7 days   Lab Units 10/18/19  1911   WBC 10*3/mm3 7.92   HEMOGLOBIN g/dL 12.5*   PLATELETS 10*3/mm3 254     Results from last 7 days   Lab Units 10/18/19  1911   SODIUM mmol/L 139   POTASSIUM mmol/L 5.1   CHLORIDE mmol/L 101   CO2 mmol/L 21.2*   BUN mg/dL 14   CREATININE mg/dL 0.92   GLUCOSE mg/dL 128*   ALBUMIN g/dL 4.10   BILIRUBIN mg/dL 1.1   ALK PHOS U/L 36*   AST (SGOT) U/L 32   ALT (SGPT) U/L 31   Estimated Creatinine Clearance: 75.4 mL/min (by C-G formula based on SCr of 0.92 mg/dL).  Results from last 7 days   Lab Units 10/18/19  1911   INR  1.25*   TROPONIN T ng/mL <0.010   PROBNP pg/mL 3,197.0*         Invalid input(s): LDLCALC  Results from last 7 days   Lab Units 10/18/19  2215   NITRITE UA  Negative     CT Angiogram Chest   Preliminary Result   1. Minimal edema/CHF with tiny effusions, right greater than left.   2. No significant pulmonary embolism. The smaller branch vessels are not   yet opacified at time of image acquisition.                       CT SCANS ABDOMEN AND PELVIS WITH IV CONTRAST.       HISTORY: Lower abdomen pain for one week       COMPARISON: None.        TECHNIQUE: Radiation dose reduction techniques were utilized, including   automated exposure control and exposure modulation based on body size.   Axial images were obtained from the lung bases to the symphysis pubis   with IV contrast only.. Oral contrast was not administered per request.       FINDINGS :  Fatty liver. Remaining solid organs have an unremarkable   appearance. Normal aorta and appendix. Extensive colonic diverticulosis.   The GI tract not opacified for assessment but non obstructive in   appearance. Mild prostate gland enlargement. There are bilateral L5 pars   defects           IMPRESSION :    1. Fatty liver.   2. Extensive diverticulosis.   3. Mild prostate gland enlargement.   4. Bilateral L5 pars defects                          CT Abdomen Pelvis With Contrast   Preliminary Result   1. Minimal edema/CHF with tiny effusions, right greater than left.   2. No significant pulmonary embolism. The smaller branch vessels are not   yet opacified at time of image acquisition.                       CT SCANS ABDOMEN AND PELVIS WITH IV CONTRAST.       HISTORY: Lower abdomen pain for one week       COMPARISON: None.       TECHNIQUE: Radiation dose reduction techniques were utilized, including   automated exposure control and exposure modulation based on body size.   Axial images were obtained from the lung bases to the symphysis pubis   with IV contrast only.. Oral contrast was not administered per request.       FINDINGS :  Fatty liver. Remaining solid organs have an unremarkable   appearance. Normal aorta and appendix. Extensive colonic diverticulosis.   The GI tract not opacified for assessment but non obstructive in   appearance. Mild prostate gland enlargement. There are bilateral L5 pars   defects           IMPRESSION :    1. Fatty liver.   2. Extensive diverticulosis.   3. Mild prostate gland enlargement.   4. Bilateral L5 pars defects                          XR Chest 1 View   Final Result         Assessment/Plan       CHF (congestive heart failure) (CMS/[facility])    Prediabetes    NSVT (nonsustained ventricular tachycardia) (CMS/[facility])    HTN (hypertension)    HLD (hyperlipidemia)      Assessment & Plan  Is a 79-year-old gentleman admitted to the hospital with new onset heart failure and nonsustained ventricular tachycardia noted in the emergency room.  1.  Acute heart failure: He is got peripheral edema noted on exam.  I do not note any signs or symptoms of pulmonary edema with no crackles noted on exam.  Is been given a dose of diuretics in the emergency room.  Plan for an echocardiogram tomorrow.  Cardiology's been consulted to evaluate.  2.  Nonsustained ventricular tachycardia: Cardiology has initiated an amiodarone drip.  His magnesium potassium and other electrolytes are all stable.  3.  Prediabetes: Last A1c was 6.3.  He is currently on metformin 1000 mg twice a day but he has not been taking this as regularly as he should.  He has been having some abdominal cramping and symptoms with this.  4.  Abdominal pain: This nondescript cramping abdominal pain seems to come and go.  There is no defining characteristics really.  There is no aggravating or relieving factors noted he has trouble even describing it.  Not really sure what to think about this.  His CAT scan was normal except for some diverticulosis.  Said no fevers or chills and there is certainly no pain on exam I doubt there is any signs of diverticulitis we will follow this over the course the hospitalization.      I discussed the patients findings and my recommendations with patient, family and nursing staff.    Juan Francisco Tavarez MD  [facility]  10/18/19  11:22 PM

## 2019-10-20 PROBLEM — E11.59 TYPE 2 DIABETES MELLITUS WITH CIRCULATORY DISORDER (HCC): Status: ACTIVE | Noted: 2019-10-20

## 2019-10-20 PROBLEM — I50.41 ACUTE COMBINED SYSTOLIC AND DIASTOLIC CONGESTIVE HEART FAILURE: Status: ACTIVE | Noted: 2019-10-18

## 2019-10-20 LAB
GLUCOSE BLDC GLUCOMTR-MCNC: 111 MG/DL (ref 70–130)
GLUCOSE BLDC GLUCOMTR-MCNC: 128 MG/DL (ref 70–130)
GLUCOSE BLDC GLUCOMTR-MCNC: 149 MG/DL (ref 70–130)

## 2019-10-20 PROCEDURE — 82962 GLUCOSE BLOOD TEST: CPT

## 2019-10-20 PROCEDURE — 99233 SBSQ HOSP IP/OBS HIGH 50: CPT | Performed by: INTERNAL MEDICINE

## 2019-10-20 PROCEDURE — 25010000002 ENOXAPARIN PER 10 MG: Performed by: HOSPITALIST

## 2019-10-20 PROCEDURE — 25010000002 FUROSEMIDE PER 20 MG: Performed by: INTERNAL MEDICINE

## 2019-10-20 RX ORDER — FUROSEMIDE 10 MG/ML
40 INJECTION INTRAMUSCULAR; INTRAVENOUS DAILY
Status: DISCONTINUED | OUTPATIENT
Start: 2019-10-20 | End: 2019-10-21

## 2019-10-20 RX ADMIN — ASPIRIN 81 MG: 81 TABLET, CHEWABLE ORAL at 08:38

## 2019-10-20 RX ADMIN — FUROSEMIDE 40 MG: 40 INJECTION, SOLUTION INTRAMUSCULAR; INTRAVENOUS at 12:01

## 2019-10-20 RX ADMIN — ATORVASTATIN CALCIUM 10 MG: 10 TABLET, FILM COATED ORAL at 20:06

## 2019-10-20 RX ADMIN — ENOXAPARIN SODIUM 40 MG: 40 INJECTION SUBCUTANEOUS at 22:43

## 2019-10-20 RX ADMIN — LISINOPRIL 20 MG: 20 TABLET ORAL at 08:39

## 2019-10-20 RX ADMIN — METOPROLOL TARTRATE 25 MG: 25 TABLET ORAL at 20:06

## 2019-10-20 RX ADMIN — METOPROLOL TARTRATE 25 MG: 25 TABLET ORAL at 08:39

## 2019-10-20 NOTE — PROGRESS NOTES
[facility] Progress Note       Encounter Date:10/20/19  Patient:Augustine Chen  :1940  MRN:[id number]      Chief Complaint: MCCRAY      Subjective:    Feeling well this AM.        Review of Systems:  Review of Systems   Constitution: Positive for weakness.   Cardiovascular: Positive for dyspnea on exertion, orthopnea and palpitations. Negative for chest pain.   Respiratory: Positive for shortness of breath.        Medications:    Current Facility-Administered Medications:   •  acetaminophen (TYLENOL) tablet 650 mg, 650 mg, Oral, Q4H PRN **OR** acetaminophen (TYLENOL) 160 MG/5ML solution 650 mg, 650 mg, Oral, Q4H PRN **OR** acetaminophen (TYLENOL) suppository 650 mg, 650 mg, Rectal, Q4H PRN, Juan Francisco Tavarez MD  •  aspirin chewable tablet 81 mg, 81 mg, Oral, Daily, Juan Francisco Tavarez MD, 81 mg at 10/20/19 0838  •  atorvastatin (LIPITOR) tablet 10 mg, 10 mg, Oral, Nightly, Juan Francisco Franks MD, 10 mg at 10/19/19 2010  •  bisacodyl (DULCOLAX) EC tablet 5 mg, 5 mg, Oral, Daily PRN, Juan Francisco Tavarez MD  •  bisacodyl (DULCOLAX) suppository 10 mg, 10 mg, Rectal, Daily PRN, Juan Francisco Tavarez MD  •  calcium carbonate (TUMS) chewable tablet 500 mg (200 mg elemental), 2 tablet, Oral, BID PRN, Juan Francisco Tavarez MD  •  enoxaparin (LOVENOX) syringe 40 mg, 40 mg, Subcutaneous, Q24H, Juan Francisco Tavarez MD, 40 mg at 10/19/19 2239  •  lisinopril (PRINIVIL,ZESTRIL) tablet 20 mg, 20 mg, Oral, Q24H, Juan Francisco Tavarez MD, 20 mg at 10/20/19 0839  •  metoprolol tartrate (LOPRESSOR) tablet 25 mg, 25 mg, Oral, Q12H, Juan Francisco Tavarez MD, 25 mg at 10/20/19 0839  •  nitroglycerin (NITROSTAT) SL tablet 0.4 mg, 0.4 mg, Sublingual, Q5 Min PRN, Juan Francisco Tavarez MD  •  ondansetron (ZOFRAN) tablet 4 mg, 4 mg, Oral, Q6H PRN **OR** ondansetron (ZOFRAN) injection 4 mg, 4 mg, Intravenous, Q6H PRN, Juan Francisco Tavarez MD  •  pantoprazole (PROTONIX) EC tablet 40 mg, 40 mg, Oral, [facility], Juan Francisco Tavarez,  MD, 40 mg at 10/19/19 0601  •  [COMPLETED] Insert peripheral IV, , , Once **AND** sodium chloride 0.9 % flush 10 mL, 10 mL, Intravenous, PRN, Eloy Dong MD, 10 mL at 10/19/19 2010       Objective:       Vitals:    10/19/19 1849 10/19/19 2321 10/20/19 0508 10/20/19 0746   BP: 149/91 148/100  130/74   BP Location: Left arm Right arm  Right arm   Patient Position: Sitting Lying  Lying   Pulse:  92  89   Resp: 18 18  18   Temp: 98 °F (36.7 °C) 97.1 °F (36.2 °C)  97.4 °F (36.3 °C)   TempSrc: Oral Oral  Oral   SpO2:  98%     Weight:   116 kg (255 lb 8.2 oz)    Height:               Physical Exam:  Constitutional: Well appearing, well developed, no acute distress   HENT: Oropharynx clear and membrane moist  Eyes: Normal conjunctiva, no sclera icterus.  Neck: Supple, no carotid bruit bilaterally.  Cardiovascular: Tachycardia rate and rhythm, No Murmur, 1+ bilateral lower extremity edema.  Pulmonary: Normal respiratory effort, normal lung sounds, no wheezing.  Abdominal: Soft, nontender, no hepatosplenomegaly, liver is non-pulsatile.  Neurological: Alert and orient x 3.   Skin: Warm, dry, no ecchymosis, no rash.  Psych: Appropriate mood and affect. Normal judgment and insight.         Lab Review:   Lab Results   Component Value Date    GLUCOSE 123 (H) 10/19/2019    BUN 13 10/19/2019    CREATININE 0.82 10/19/2019    EGFRIFNONA 91 10/19/2019    BCR 15.9 10/19/2019    K 4.4 10/19/2019    CO2 20.9 (L) 10/19/2019    CALCIUM 8.9 10/19/2019    ALBUMIN 4.10 10/18/2019    AST 32 10/18/2019    ALT 31 10/18/2019       Lab Results   Component Value Date    WBC 7.58 10/19/2019    HGB 12.7 (L) 10/19/2019    HCT 38.7 10/19/2019    MCV 92.8 10/19/2019     10/19/2019       Lab Results   Component Value Date    TROPONINT <0.010 10/19/2019       Lab Results   Component Value Date    TSH 4.530 (H) 10/18/2019     Echocardiogram 10/19/2019:  · The left ventricular cavity is moderately dilated.  · There is severe global  hypokinesis.  · Calculated EF = 23.0%. Estimated EF was in agreement with the calculated EF  · Left ventricular diastolic dysfunction is noted (grade II w/high LAP) consistent with pseudonormalization.  · Right ventricular cavity is mild-to-moderately dilated. Mildly reduced right ventricular systolic function noted.  · Left atrial cavity size is moderate-to-severely dilated.  · Right atrial cavity size is moderate-to-severely dilated.  · The mitral valve leaflets are thickened, calcified, and mildly rheumatic in appearance.  · No significant mitral valve stenosis is present  · Mild-to-moderate mitral valve regurgitation is present.  · Mild to moderate tricuspid valve regurgitation is present.  · Calculated right ventricular systolic pressure from tricuspid regurgitation is 52 mmHg.  · There is no evidence of pericardial effusion.         Assessment:          Diagnosis Plan   1. Acute congestive heart failure, unspecified heart failure type (CMS/HCC)     2. Ventricular tachycardia, non-sustained (CMS/HCC)     3. Lower abdominal pain            Plan:     Mr. Chen is a 79-year-old gentleman with past medical history notable for diabetes, hypertension, mixed hyperlipidemia presented to the hospital with worsening shortness of breath and abdominal discomfort.  He was noted to have nonsustained ventricular tachycardia and a new echocardiogram is demonstrated a newly reduced ejection fraction of 26% with pulmonary hypertension.  Given these new findings would be appropriate for proceeding with a right left heart catheterization to further define the etiology of his cardiomyopathy and exclude significant coronary disease will make n.p.o. after midnight tonight with plan for cardiac cath in a.m.    Acute systolic congestive heart failure:  Plan for cardiac catheterization in a.m.  Continue beta-blocker therapy  Continue lisinopril  We will start IV diuretics    Hypertension:  Continue home medications    Mixed  hyperlipidemia:  Continue statin               Delmer Reilly MD  [facility]  10/20/19  10:06 AM

## 2019-10-20 NOTE — PLAN OF CARE
Problem: Patient Care Overview  Goal: Plan of Care Review  Outcome: Ongoing (interventions implemented as appropriate)   10/20/19 0569   Coping/Psychosocial   Plan of Care Reviewed With patient   Plan of Care Review   Progress no change   OTHER   Outcome Summary Pt AO, VSS on 2 L NC at HS, SOA with activity, Family at Bedside

## 2019-10-20 NOTE — PROGRESS NOTES
"    DAILY PROGRESS NOTE  [facility]    Patient Identification:  Name: Augustine Sample  Age: 79 y.o.  Sex: male  :  1940  MRN: [id number]         Primary Care Physician: Provider, No Known    Subjective:  Interval History: No new issues.  Patient denies any further abdominal pain chest pain palpitations cough shortness of breath altered mentation nausea vomiting or diarrhea    Objective: Counseled patient with spouse present at bedside answered all of her questions to keep them up-to-date with current clinical process    Scheduled Meds:    aspirin 81 mg Oral Daily   atorvastatin 10 mg Oral Nightly   enoxaparin 40 mg Subcutaneous Q24H   furosemide 40 mg Intravenous Daily   lisinopril 20 mg Oral Q24H   metoprolol tartrate 25 mg Oral Q12H   pantoprazole 40 mg Oral QAM     Continuous Infusions:     Vital signs in last 24 hours:  Temp:  [97.1 °F (36.2 °C)-98 °F (36.7 °C)] 97.4 °F (36.3 °C)  Heart Rate:  [89-92] 89  Resp:  [18] 18  BP: (130-149)/() 130/74    Intake/Output:    Intake/Output Summary (Last 24 hours) at 10/20/2019 1301  Last data filed at 10/20/2019 0900  Gross per 24 hour   Intake 570 ml   Output 850 ml   Net -280 ml       Exam:  /74 (BP Location: Right arm, Patient Position: Lying)   Pulse 89   Temp 97.4 °F (36.3 °C) (Oral)   Resp 18   Ht 167.6 cm (66\")   Wt 116 kg (255 lb 8.2 oz)   SpO2 98%   BMI 41.24 kg/m²     General Appearance:    Alert, cooperative, no distress, AAOx3, nontoxic, morbidly obese with a BMI of 41                          Head:    Normocephalic, without obvious abnormality, atraumatic                        Throat:   Oral mucosa pink and moist                           Neck: Unable to really visualize JVD secondary to body habitus                         Lungs:    Clear to auscultation bilaterally, respirations unlabored                 Chest Wall:    No tenderness or deformity                          Heart:    Regular rate and rhythm, S1 and S2 " normal                  Abdomen:     Soft, non-tender, bowel sounds active                 Extremities: Grossly moving all counseled to keep legs elevated while sitting, no cyanosis with trace/1+ edema                        Pulses:   Pulses palpable in lower extremities                                 Data Review:  Labs in chart were reviewed.    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       Plan:    Cards to cath in a.m. given systolic and diastolic CHF noted on echocardiogram with no past history of CHF   -Suggest outpatient sleep study, weight loss and lifestyle changes   -Cardiology diuresing with ACE and beta-blocker -creatinine 0.82 with a GFR of 91   -Nonsustained V. Tach -on telemetry   -Abdominal pain likely referred from cardiac issue    DM2 with an A1c of 6.2   -Metformin on hold    Lovenox for DVT prophylaxis      José Manuel Arango MD  10/20/2019  1:01 PM

## 2019-10-20 NOTE — PLAN OF CARE
Problem: Patient Care Overview  Goal: Plan of Care Review  Outcome: Ongoing (interventions implemented as appropriate)   10/20/19 0482   Coping/Psychosocial   Plan of Care Reviewed With patient   Plan of Care Review   Progress improving   OTHER   Outcome Summary VSS. Pt on and off 2L NC. Lasix given with good results. NPO after MN for cath in AM. No c/o pain, will continue to monitor

## 2019-10-21 LAB
ANION GAP SERPL CALCULATED.3IONS-SCNC: 12.5 MMOL/L (ref 5–15)
BUN BLD-MCNC: 13 MG/DL (ref 8–23)
BUN/CREAT SERPL: 15.7 (ref 7–25)
CALCIUM SPEC-SCNC: 8.7 MG/DL (ref 8.6–10.5)
CHLORIDE SERPL-SCNC: 104 MMOL/L (ref 98–107)
CO2 SERPL-SCNC: 25.5 MMOL/L (ref 22–29)
CREAT BLD-MCNC: 0.83 MG/DL (ref 0.76–1.27)
GFR SERPL CREATININE-BSD FRML MDRD: 89 ML/MIN/1.73
GLUCOSE BLD-MCNC: 113 MG/DL (ref 65–99)
GLUCOSE BLDC GLUCOMTR-MCNC: 113 MG/DL (ref 70–130)
GLUCOSE BLDC GLUCOMTR-MCNC: 117 MG/DL (ref 70–130)
GLUCOSE BLDC GLUCOMTR-MCNC: 130 MG/DL (ref 70–130)
GLUCOSE BLDC GLUCOMTR-MCNC: 166 MG/DL (ref 70–130)
HCT VFR BLDA CALC: 38 % (ref 38–51)
HCT VFR BLDA CALC: 38 % (ref 38–51)
HGB BLDA-MCNC: 12.9 G/DL (ref 12–17)
HGB BLDA-MCNC: 12.9 G/DL (ref 12–17)
POTASSIUM BLD-SCNC: 3.8 MMOL/L (ref 3.5–5.2)
SAO2 % BLDA: 60 % (ref 95–98)
SAO2 % BLDA: 95 % (ref 95–98)
SODIUM BLD-SCNC: 142 MMOL/L (ref 136–145)
URATE SERPL-MCNC: 9.1 MG/DL (ref 3.4–7)

## 2019-10-21 PROCEDURE — 4A023N7 MEASUREMENT OF CARDIAC SAMPLING AND PRESSURE, LEFT HEART, PERCUTANEOUS APPROACH: ICD-10-PCS | Performed by: INTERNAL MEDICINE

## 2019-10-21 PROCEDURE — 82962 GLUCOSE BLOOD TEST: CPT

## 2019-10-21 PROCEDURE — 99152 MOD SED SAME PHYS/QHP 5/>YRS: CPT | Performed by: INTERNAL MEDICINE

## 2019-10-21 PROCEDURE — 80048 BASIC METABOLIC PNL TOTAL CA: CPT | Performed by: HOSPITALIST

## 2019-10-21 PROCEDURE — B2161ZZ FLUOROSCOPY OF RIGHT AND LEFT HEART USING LOW OSMOLAR CONTRAST: ICD-10-PCS | Performed by: INTERNAL MEDICINE

## 2019-10-21 PROCEDURE — 85014 HEMATOCRIT: CPT

## 2019-10-21 PROCEDURE — 25010000002 FENTANYL CITRATE (PF) 100 MCG/2ML SOLUTION: Performed by: INTERNAL MEDICINE

## 2019-10-21 PROCEDURE — 84550 ASSAY OF BLOOD/URIC ACID: CPT | Performed by: HOSPITALIST

## 2019-10-21 PROCEDURE — C1894 INTRO/SHEATH, NON-LASER: HCPCS | Performed by: INTERNAL MEDICINE

## 2019-10-21 PROCEDURE — 25010000002 HEPARIN (PORCINE) PER 1000 UNITS: Performed by: INTERNAL MEDICINE

## 2019-10-21 PROCEDURE — B2111ZZ FLUOROSCOPY OF MULTIPLE CORONARY ARTERIES USING LOW OSMOLAR CONTRAST: ICD-10-PCS | Performed by: INTERNAL MEDICINE

## 2019-10-21 PROCEDURE — 93460 R&L HRT ART/VENTRICLE ANGIO: CPT | Performed by: INTERNAL MEDICINE

## 2019-10-21 PROCEDURE — 25010000002 FUROSEMIDE PER 20 MG: Performed by: INTERNAL MEDICINE

## 2019-10-21 PROCEDURE — C1769 GUIDE WIRE: HCPCS | Performed by: INTERNAL MEDICINE

## 2019-10-21 PROCEDURE — 99233 SBSQ HOSP IP/OBS HIGH 50: CPT | Performed by: INTERNAL MEDICINE

## 2019-10-21 PROCEDURE — 85018 HEMOGLOBIN: CPT

## 2019-10-21 PROCEDURE — 25010000002 MIDAZOLAM PER 1 MG: Performed by: INTERNAL MEDICINE

## 2019-10-21 PROCEDURE — 0 IOPAMIDOL PER 1 ML: Performed by: INTERNAL MEDICINE

## 2019-10-21 RX ORDER — MIDAZOLAM HYDROCHLORIDE 1 MG/ML
INJECTION INTRAMUSCULAR; INTRAVENOUS AS NEEDED
Status: DISCONTINUED | OUTPATIENT
Start: 2019-10-21 | End: 2019-10-21 | Stop reason: HOSPADM

## 2019-10-21 RX ORDER — ACETAMINOPHEN 325 MG/1
650 TABLET ORAL EVERY 4 HOURS PRN
Status: DISCONTINUED | OUTPATIENT
Start: 2019-10-21 | End: 2019-10-22 | Stop reason: HOSPADM

## 2019-10-21 RX ORDER — ONDANSETRON 2 MG/ML
4 INJECTION INTRAMUSCULAR; INTRAVENOUS EVERY 6 HOURS PRN
Status: DISCONTINUED | OUTPATIENT
Start: 2019-10-21 | End: 2019-10-22 | Stop reason: HOSPADM

## 2019-10-21 RX ORDER — FENTANYL CITRATE 50 UG/ML
INJECTION, SOLUTION INTRAMUSCULAR; INTRAVENOUS AS NEEDED
Status: DISCONTINUED | OUTPATIENT
Start: 2019-10-21 | End: 2019-10-21 | Stop reason: HOSPADM

## 2019-10-21 RX ORDER — MORPHINE SULFATE 2 MG/ML
1 INJECTION, SOLUTION INTRAMUSCULAR; INTRAVENOUS EVERY 4 HOURS PRN
Status: DISCONTINUED | OUTPATIENT
Start: 2019-10-21 | End: 2019-10-22 | Stop reason: HOSPADM

## 2019-10-21 RX ORDER — HYDROCODONE BITARTRATE AND ACETAMINOPHEN 5; 325 MG/1; MG/1
1 TABLET ORAL EVERY 4 HOURS PRN
Status: DISCONTINUED | OUTPATIENT
Start: 2019-10-21 | End: 2019-10-22 | Stop reason: HOSPADM

## 2019-10-21 RX ORDER — ONDANSETRON 4 MG/1
4 TABLET, FILM COATED ORAL EVERY 6 HOURS PRN
Status: DISCONTINUED | OUTPATIENT
Start: 2019-10-21 | End: 2019-10-22 | Stop reason: HOSPADM

## 2019-10-21 RX ORDER — NALOXONE HCL 0.4 MG/ML
0.4 VIAL (ML) INJECTION
Status: DISCONTINUED | OUTPATIENT
Start: 2019-10-21 | End: 2019-10-22 | Stop reason: HOSPADM

## 2019-10-21 RX ORDER — SODIUM CHLORIDE 9 MG/ML
INJECTION, SOLUTION INTRAVENOUS CONTINUOUS PRN
Status: COMPLETED | OUTPATIENT
Start: 2019-10-21 | End: 2019-10-21

## 2019-10-21 RX ORDER — FUROSEMIDE 10 MG/ML
80 INJECTION INTRAMUSCULAR; INTRAVENOUS ONCE
Status: COMPLETED | OUTPATIENT
Start: 2019-10-21 | End: 2019-10-21

## 2019-10-21 RX ORDER — CARVEDILOL 6.25 MG/1
6.25 TABLET ORAL 2 TIMES DAILY WITH MEALS
Status: DISCONTINUED | OUTPATIENT
Start: 2019-10-21 | End: 2019-10-22 | Stop reason: HOSPADM

## 2019-10-21 RX ORDER — LIDOCAINE HYDROCHLORIDE 20 MG/ML
INJECTION, SOLUTION INFILTRATION; PERINEURAL AS NEEDED
Status: DISCONTINUED | OUTPATIENT
Start: 2019-10-21 | End: 2019-10-21 | Stop reason: HOSPADM

## 2019-10-21 RX ADMIN — ASPIRIN 81 MG: 81 TABLET, CHEWABLE ORAL at 16:38

## 2019-10-21 RX ADMIN — LISINOPRIL 20 MG: 20 TABLET ORAL at 16:38

## 2019-10-21 RX ADMIN — ATORVASTATIN CALCIUM 10 MG: 10 TABLET, FILM COATED ORAL at 20:09

## 2019-10-21 RX ADMIN — CARVEDILOL 6.25 MG: 6.25 TABLET, FILM COATED ORAL at 18:05

## 2019-10-21 RX ADMIN — FUROSEMIDE 80 MG: 40 INJECTION, SOLUTION INTRAMUSCULAR; INTRAVENOUS at 16:37

## 2019-10-21 NOTE — PROGRESS NOTES
[facility] Progress Note       Encounter Date:10/21/19  Patient:Augustine Chen  :1940  MRN:[id number]      Chief Complaint: SOB      Subjective:    Patient continues to do well.  Tolerating diuretics and current heart failure regimen        Review of Systems:  Review of Systems   Constitution: Positive for weakness.   Cardiovascular: Positive for leg swelling and orthopnea. Negative for chest pain and palpitations.   Respiratory: Negative for cough.        Medications:    Current Facility-Administered Medications:   •  acetaminophen (TYLENOL) tablet 650 mg, 650 mg, Oral, Q4H PRN **OR** acetaminophen (TYLENOL) 160 MG/5ML solution 650 mg, 650 mg, Oral, Q4H PRN **OR** acetaminophen (TYLENOL) suppository 650 mg, 650 mg, Rectal, Q4H PRN, Juan Francisco Tavarez MD  •  aspirin chewable tablet 81 mg, 81 mg, Oral, Daily, Juan Francisco Tavarez MD, 81 mg at 10/20/19 0838  •  atorvastatin (LIPITOR) tablet 10 mg, 10 mg, Oral, Nightly, Juan Francisco Franks MD, 10 mg at 10/20/19 2006  •  bisacodyl (DULCOLAX) EC tablet 5 mg, 5 mg, Oral, Daily PRN, Juan Francisco Tavarez MD  •  bisacodyl (DULCOLAX) suppository 10 mg, 10 mg, Rectal, Daily PRN, Juan Francisco Tavarez MD  •  calcium carbonate (TUMS) chewable tablet 500 mg (200 mg elemental), 2 tablet, Oral, BID PRN, Juan Francisco Tavarez MD  •  enoxaparin (LOVENOX) syringe 40 mg, 40 mg, Subcutaneous, Q24H, Juan Francisco Tavarez MD, 40 mg at 10/20/19 2243  •  furosemide (LASIX) injection 40 mg, 40 mg, Intravenous, Daily, Delmer Reilly MD, 40 mg at 10/20/19 1201  •  lisinopril (PRINIVIL,ZESTRIL) tablet 20 mg, 20 mg, Oral, Q24H, Juan Francisco Tavarez MD, 20 mg at 10/20/19 0839  •  metoprolol tartrate (LOPRESSOR) tablet 25 mg, 25 mg, Oral, Q12H, Juan Francisco Tavarez MD, 25 mg at 10/20/19 2006  •  nitroglycerin (NITROSTAT) SL tablet 0.4 mg, 0.4 mg, Sublingual, Q5 Min PRN, Juan Francisco Tavarez MD  •  ondansetron (ZOFRAN) tablet 4 mg, 4 mg, Oral, Q6H PRN **OR** ondansetron  (ZOFRAN) injection 4 mg, 4 mg, Intravenous, Q6H PRN, Juan Francisco Tavarez MD  •  pantoprazole (PROTONIX) EC tablet 40 mg, 40 mg, Oral, QAM, Juan Francisco Tavarez MD, 40 mg at 10/19/19 0601  •  [COMPLETED] Insert peripheral IV, , , Once **AND** sodium chloride 0.9 % flush 10 mL, 10 mL, Intravenous, PRN, Eloy Dong MD, 10 mL at 10/19/19 2010       Objective:       Vitals:    10/20/19 1922 10/20/19 2328 10/21/19 0503 10/21/19 0725   BP: 129/86 125/88  126/82   BP Location: Left arm Right arm  Right arm   Patient Position: Lying Lying  Sitting   Pulse: 97 84  95   Resp: 18 18  20   Temp: 97.3 °F (36.3 °C) 97.3 °F (36.3 °C)  97.4 °F (36.3 °C)   TempSrc: Oral Oral  Oral   SpO2:  98%  94%   Weight:   116 kg (256 lb 13.4 oz)    Height:               Physical Exam:  Constitutional: Well appearing, well developed, no acute distress   HENT: Oropharynx clear and membrane moist  Eyes: Normal conjunctiva, no sclera icterus.  Neck: Supple, no carotid bruit bilaterally.  Cardiovascular: Tachycardia rate and rhythm, No Murmur, 1+ bilateral lower extremity edema.  Pulmonary: Normal respiratory effort, normal lung sounds, no wheezing.  Abdominal: Soft, nontender, no hepatosplenomegaly, liver is non-pulsatile.  Neurological: Alert and orient x 3.   Skin: Warm, dry, no ecchymosis, no rash.  Psych: Appropriate mood and affect. Normal judgment and insight.         Lab Review:   Lab Results   Component Value Date    GLUCOSE 113 (H) 10/21/2019    BUN 13 10/21/2019    CREATININE 0.83 10/21/2019    EGFRIFNONA 89 10/21/2019    BCR 15.7 10/21/2019    K 3.8 10/21/2019    CO2 25.5 10/21/2019    CALCIUM 8.7 10/21/2019    ALBUMIN 4.10 10/18/2019    AST 32 10/18/2019    ALT 31 10/18/2019       Lab Results   Component Value Date    WBC 7.58 10/19/2019    HGB 12.7 (L) 10/19/2019    HCT 38.7 10/19/2019    MCV 92.8 10/19/2019     10/19/2019       Lab Results   Component Value Date    TROPONINT <0.010 10/19/2019         Lab Results    Component Value Date    TSH 4.530 (H) 10/18/2019       Echocardiogram 10/19/2019:  · The left ventricular cavity is moderately dilated.  · There is severe global hypokinesis.  · Calculated EF = 23.0%. Estimated EF was in agreement with the calculated EF  · Left ventricular diastolic dysfunction is noted (grade II w/high LAP) consistent with pseudonormalization.  · Right ventricular cavity is mild-to-moderately dilated. Mildly reduced right ventricular systolic function noted.  · Left atrial cavity size is moderate-to-severely dilated.  · Right atrial cavity size is moderate-to-severely dilated.  · The mitral valve leaflets are thickened, calcified, and mildly rheumatic in appearance.  · No significant mitral valve stenosis is present  · Mild-to-moderate mitral valve regurgitation is present.  · Mild to moderate tricuspid valve regurgitation is present.  · Calculated right ventricular systolic pressure from tricuspid regurgitation is 52 mmHg.  · There is no evidence of pericardial effusion.       Assessment:          Diagnosis Plan   1. Acute congestive heart failure, unspecified heart failure type (CMS/HCC)     2. Ventricular tachycardia, non-sustained (CMS/HCC)     3. Lower abdominal pain            Plan:     Mr. Chen is a 79-year-old gentleman with past medical history notable for diabetes, hypertension, mixed hyperlipidemia presented to the hospital with worsening shortness of breath and abdominal discomfort.  He was noted to have nonsustained ventricular tachycardia and a new echocardiogram is demonstrated a newly reduced ejection fraction of 26% with pulmonary hypertension.  Given these new findings would be appropriate for proceeding with a right left heart catheterization which is planned for later today to further define the etiology of his cardiomyopathy and exclude significant coronary disease.  Based upon his filling pressures we can further titrate his diuretics and congestive heart failure regimen.   He does remain tachycardic and may uptitrate his beta-blocker pending his cath results     acute systolic congestive heart failure:  Plan for cardiac catheterization in a.m.  Continue beta-blocker therapy likely transition to carvedilol  Continue lisinopril  Continue IV diuretics and transition to oral pending heart cath filling pressures     Hypertension:  Continue home medications     Mixed hyperlipidemia:  Continue statin                  Delmer Reilly MD  [facility]  10/21/19  9:07 AM

## 2019-10-21 NOTE — PROGRESS NOTES
"    DAILY PROGRESS NOTE  [facility]    Patient Identification:  Name: Augustine Sample  Age: 79 y.o.  Sex: male  :  1940  MRN: [id number]         Primary Care Physician: Mic Rangel MD    Subjective:  Interval History: Continues to be free of chest pain.  Denies any shortness of breath at rest.  Denies nausea vomiting or altered mentation    Objective: Son and spouse at bedside.  Answered all of their questions to the best of my ability    Scheduled Meds:  [MAR Hold] aspirin 81 mg Oral Daily   [MAR Hold] atorvastatin 10 mg Oral Nightly   [MAR Hold] enoxaparin 40 mg Subcutaneous Q24H   [MAR Hold] furosemide 40 mg Intravenous Daily   lisinopril 20 mg Oral Q24H   metoprolol tartrate 25 mg Oral Q12H   [MAR Hold] pantoprazole 40 mg Oral QAM     Continuous Infusions:  sodium chloride  Last Rate: 50 mL/hr (10/21/19 1220)       Vital signs in last 24 hours:  Temp:  [97.3 °F (36.3 °C)-98.1 °F (36.7 °C)] 97.4 °F (36.3 °C)  Heart Rate:  [84-98] 96  Resp:  [18-20] 20  BP: (113-129)/(72-88) 126/82    Intake/Output:    Intake/Output Summary (Last 24 hours) at 10/21/2019 1239  Last data filed at 10/20/2019 2327  Gross per 24 hour   Intake --   Output 1025 ml   Net -1025 ml       Exam:  /82 (BP Location: Right arm, Patient Position: Sitting)   Pulse 96   Temp 97.4 °F (36.3 °C) (Oral)   Resp 20   Ht 167.6 cm (66\")   Wt 116 kg (256 lb 13.4 oz)   SpO2 94%   BMI 41.45 kg/m²     General Appearance:    Alert, cooperative, no distress, AAOx3                          Neck:   Supple, symmetrical, trachea midline, no JVD                         Lungs:    Clear to auscultation bilaterally, respirations unlabored                          Heart:    Regular rate and rhythm, S1 and S2 normal                  Abdomen:     Soft, non-tender, bowel sounds active, no masses, no                                                        organomegaly                  Extremities: Moving all, no cyanosis though still " with 1+ pitting edema                        Pulses:   Pulses palpable in lower extremities                               Data Review:  Labs in chart were reviewed.    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       Plan:    Cardiac cath today given systolic and diastolic CHF noted on echocardiogram with no past history of CHF              -Suggest outpatient sleep study, weight loss and lifestyle changes              -Cardiology diuresing with ACE and beta-blocker -creatinine 0.82 -0.83              -Nonsustained V. Tach               -Abdominal pain likely referred from cardiac issue     DM2 with an A1c of 6.2              -Metformin on hold     Lovenox for DVT prophylaxis    José Manuel Arango MD  10/21/2019  12:39 PM

## 2019-10-21 NOTE — DISCHARGE INSTRUCTIONS
[facility]  [address] [place] [address]    Coronary Angiogram (Radial/Ulnar Approach) After Care    Refer to this sheet in the next few weeks. These instructions provide you with information on caring for yourself after your procedure. Your caregiver may also give you more specific instructions. Your treatment has been planned according to current medical practices, but problems sometimes occur. Call your caregiver if you have any problems or questions after your procedure.    Home Care Instructions:  · You may shower the day after the procedure. Remove the bandage (dressing) and gently wash the site with plain soap and water. Gently pat the site dry. You may apply a band aid daily for 2 days if desired.    · Do not apply powder or lotion to the site.  · Do not submerge the affected site in water for 3 to 5 days or until the site is completely healed.   · Do not lift, push or pull anything over 10 pounds for 2 days after your procedure.  · Inspect the site at least twice daily. You may notice some bruising at the site and it may be tender for 1 to 2 weeks.     · Increase your fluid intake for the next 2 days.    · Keep arm elevated for 24 hours. For the remainder of the day, keep your arm in “Pledge of Allegiance” position when up and about.     · You may drive 24 hours after the procedure unless otherwise instructed by your caregiver.  · Do not operate machinery or power tools for 24 hours.  · A responsible adult should be with you for the first 24 hours after you arrive home. Do not make any important legal decisions or sign legal papers for 24 hours.  Do not drink alcohol for 24 hours.    · Metformin or any medications containing Metformin should not be taken for 48 hours after your procedure.      Call Your Doctor if:   · You have unusual pain at the radial/ulnar (wrist) site.  · You have redness, warmth, swelling, or pain at the radial/ulnar (wrist) site.  · You have drainage (other  than a small amount of blood on the dressing).  · You have chills or a fever > 101.  · Your arm becomes pale or dark, cool, tingly, or numb.  · You have heavy bleeding from the site, hold pressure on the site for 20 minutes.  If the bleeding stops, apply a fresh bandage and call your cardiologist.  However, if you continue to have bleeding, call 911.

## 2019-10-21 NOTE — PLAN OF CARE
Problem: Patient Care Overview  Goal: Plan of Care Review  Outcome: Ongoing (interventions implemented as appropriate)   10/21/19 1010   Coping/Psychosocial   Plan of Care Reviewed With patient;spouse   OTHER   Outcome Summary Patient alert and oriented x 4, had heart cath today, will diurese and manage medically, dressing to right wrist and elbow, dry and intact, arm to be kept on pillow with no weight bearing, increase in lasix with good response.       Problem: Cardiac: Heart Failure (Adult)  Goal: Signs and Symptoms of Listed Potential Problems Will be Absent, Minimized or Managed (Cardiac: Heart Failure)  Outcome: Ongoing (interventions implemented as appropriate)      Problem: Arrhythmia/Dysrhythmia (Symptomatic) (Adult)  Goal: Signs and Symptoms of Listed Potential Problems Will be Absent, Minimized or Managed (Arrhythmia/Dysrhythmia)  Outcome: Ongoing (interventions implemented as appropriate)      Problem: Fluid Volume Excess (Adult)  Goal: Optimal Fluid Balance  Outcome: Ongoing (interventions implemented as appropriate)      Problem: Fall Risk (Adult)  Goal: Absence of Fall  Outcome: Ongoing (interventions implemented as appropriate)

## 2019-10-21 NOTE — PROGRESS NOTES
Discharge Planning Assessment  [facility]     Patient Name: Augustine hCen  MRN: [id number]  Today's Date: 10/21/2019    Admit Date: 10/18/2019    Discharge Needs Assessment     Row Name 10/21/19 1052       Living Environment    Lives With  spouse    Current Living Arrangements  home/apartment/condo    Primary Care Provided by  self    Family Caregiver if Needed  spouse;child(nimisha), adult    Quality of Family Relationships  helpful;involved;supportive    Able to Return to Prior Arrangements  yes       Transition Planning    Patient/Family Anticipates Transition to  home with family    Transportation Anticipated  family or friend will provide;car, drives self       Discharge Needs Assessment    Readmission Within the Last 30 Days  no previous admission in last 30 days    Equipment Currently Used at Home  none        Discharge Plan     Row Name 10/21/19 1053       Plan    Plan  Home.  CCP will continue to follow and assist as needed.     Plan Comments  Spoke with pt, his wife Maureen and son Aba at bedside.  Facesheet and pharmacy info confirmed.  Pt lives in [place], [place] but is in [place] to visit his son Aba.  He and his wife are staying with Aba while they are in town.  No hx of DME, HH or SNF.  He does have an advance directive.  Copy requested.  Pt plans to return to his son's house upon DC, and then return home to [place] when he is feeling better.         Destination      No service coordination in this encounter.      Durable Medical Equipment      No service coordination in this encounter.      Dialysis/Infusion      No service coordination in this encounter.      Home Medical Care      No service coordination in this encounter.      Therapy      No service coordination in this encounter.      Community Resources      No service coordination in this encounter.          Demographic Summary     Row Name 10/21/19 1051       General Information    Admission Type  inpatient    Arrived From  home    Referral Source   admission list    Reason for Consult  discharge planning    Preferred Language  English        Functional Status     Row Name 10/21/19 1052       Functional Status    Usual Activity Tolerance  good    Current Activity Tolerance  good       Functional Status, IADL    Medications  independent    Meal Preparation  assistive person;independent    Housekeeping  assistive person;independent    Laundry  assistive person;independent    Shopping  assistive person;independent       Mental Status    General Appearance WDL  WDL       Mental Status Summary    Recent Changes in Mental Status/Cognitive Functioning  no changes       Employment/    Employment Status  retired        Psychosocial    No documentation.       Abuse/Neglect    No documentation.       Legal    No documentation.       Substance Abuse    No documentation.       Patient Forms    No documentation.           Shannon Quezada RN

## 2019-10-21 NOTE — PLAN OF CARE
Problem: Patient Care Overview  Goal: Plan of Care Review  Outcome: Ongoing (interventions implemented as appropriate)   10/21/19 0454   Coping/Psychosocial   Plan of Care Reviewed With patient   Plan of Care Review   Progress improving   OTHER   Outcome Summary NPO since midnight. Normal sinus during the night, @ 2300 pt had 7 beats of V tach. Pt denied chest pain or soa,        Problem: Cardiac: Heart Failure (Adult)  Goal: Signs and Symptoms of Listed Potential Problems Will be Absent, Minimized or Managed (Cardiac: Heart Failure)  Outcome: Ongoing (interventions implemented as appropriate)   10/21/19 0454   Goal/Outcome Evaluation   Problems Assessed (Heart Failure) dysrhythmia/arrhythmia       Problem: Arrhythmia/Dysrhythmia (Symptomatic) (Adult)  Goal: Signs and Symptoms of Listed Potential Problems Will be Absent, Minimized or Managed (Arrhythmia/Dysrhythmia)  Outcome: Ongoing (interventions implemented as appropriate)   10/21/19 0454   Goal/Outcome Evaluation   Problems Assessed (Arrhythmia/Dysrhythmia) hypoxia/hypoxemia       Problem: Fluid Volume Excess (Adult)  Goal: Optimal Fluid Balance  Outcome: Ongoing (interventions implemented as appropriate)   10/21/19 0454   Fluid Volume Excess (Adult)   Optimal Fluid Balance making progress toward outcome       Problem: Fall Risk (Adult)  Goal: Absence of Fall  Outcome: Ongoing (interventions implemented as appropriate)   10/21/19 0454   Fall Risk (Adult)   Absence of Fall making progress toward outcome

## 2019-10-22 VITALS
HEIGHT: 66 IN | RESPIRATION RATE: 20 BRPM | HEART RATE: 98 BPM | WEIGHT: 253.53 LBS | TEMPERATURE: 97.6 F | SYSTOLIC BLOOD PRESSURE: 118 MMHG | OXYGEN SATURATION: 91 % | DIASTOLIC BLOOD PRESSURE: 69 MMHG | BODY MASS INDEX: 40.75 KG/M2

## 2019-10-22 PROBLEM — I27.20 PULMONARY HYPERTENSION: Status: ACTIVE | Noted: 2019-10-22

## 2019-10-22 LAB
ANION GAP SERPL CALCULATED.3IONS-SCNC: 11.9 MMOL/L (ref 5–15)
BUN BLD-MCNC: 13 MG/DL (ref 8–23)
BUN/CREAT SERPL: 17.3 (ref 7–25)
CALCIUM SPEC-SCNC: 9.1 MG/DL (ref 8.6–10.5)
CHLORIDE SERPL-SCNC: 98 MMOL/L (ref 98–107)
CO2 SERPL-SCNC: 27.1 MMOL/L (ref 22–29)
CREAT BLD-MCNC: 0.75 MG/DL (ref 0.76–1.27)
GFR SERPL CREATININE-BSD FRML MDRD: 100 ML/MIN/1.73
GLUCOSE BLD-MCNC: 171 MG/DL (ref 65–99)
GLUCOSE BLDC GLUCOMTR-MCNC: 123 MG/DL (ref 70–130)
POTASSIUM BLD-SCNC: 3.8 MMOL/L (ref 3.5–5.2)
SODIUM BLD-SCNC: 137 MMOL/L (ref 136–145)

## 2019-10-22 PROCEDURE — 82962 GLUCOSE BLOOD TEST: CPT

## 2019-10-22 PROCEDURE — 99232 SBSQ HOSP IP/OBS MODERATE 35: CPT | Performed by: INTERNAL MEDICINE

## 2019-10-22 PROCEDURE — 80048 BASIC METABOLIC PNL TOTAL CA: CPT | Performed by: INTERNAL MEDICINE

## 2019-10-22 RX ORDER — POTASSIUM CHLORIDE 20 MEQ/1
20 TABLET, EXTENDED RELEASE ORAL DAILY
Qty: 30 TABLET | Refills: 0 | Status: SHIPPED | OUTPATIENT
Start: 2019-10-22 | End: 2022-05-04

## 2019-10-22 RX ORDER — RANITIDINE HCL 75 MG
75 TABLET ORAL DAILY PRN
COMMUNITY
End: 2021-08-06 | Stop reason: ALTCHOICE

## 2019-10-22 RX ORDER — CARVEDILOL 6.25 MG/1
6.25 TABLET ORAL 2 TIMES DAILY WITH MEALS
Qty: 60 TABLET | Refills: 0 | Status: SHIPPED | OUTPATIENT
Start: 2019-10-22 | End: 2021-02-09

## 2019-10-22 RX ORDER — FUROSEMIDE 40 MG/1
40 TABLET ORAL
Status: DISCONTINUED | OUTPATIENT
Start: 2019-10-22 | End: 2019-10-22 | Stop reason: HOSPADM

## 2019-10-22 RX ORDER — FUROSEMIDE 40 MG/1
40 TABLET ORAL 2 TIMES DAILY
Qty: 60 TABLET | Refills: 0 | Status: SHIPPED | OUTPATIENT
Start: 2019-10-22 | End: 2022-04-21 | Stop reason: SDUPTHER

## 2019-10-22 RX ADMIN — PANTOPRAZOLE SODIUM 40 MG: 40 TABLET, DELAYED RELEASE ORAL at 06:19

## 2019-10-22 RX ADMIN — CARVEDILOL 6.25 MG: 6.25 TABLET, FILM COATED ORAL at 08:17

## 2019-10-22 RX ADMIN — LISINOPRIL 20 MG: 20 TABLET ORAL at 08:17

## 2019-10-22 RX ADMIN — ASPIRIN 81 MG: 81 TABLET, CHEWABLE ORAL at 08:17

## 2019-10-22 RX ADMIN — FUROSEMIDE 40 MG: 40 TABLET ORAL at 08:17

## 2019-10-22 NOTE — PLAN OF CARE
Problem: Patient Care Overview  Goal: Plan of Care Review  Outcome: Ongoing (interventions implemented as appropriate)   10/22/19 0646   Coping/Psychosocial   Plan of Care Reviewed With patient   Plan of Care Review   Progress improving   OTHER   Outcome Summary Slept most of the night with right arm elevated as ordered and denies any pain. Normal sinus on the monitor.       Problem: Cardiac: Heart Failure (Adult)  Goal: Signs and Symptoms of Listed Potential Problems Will be Absent, Minimized or Managed (Cardiac: Heart Failure)  Outcome: Ongoing (interventions implemented as appropriate)   10/22/19 0646   Goal/Outcome Evaluation   Problems Assessed (Heart Failure) cardiac pump dysfunction;dysrhythmia/arrhythmia   Problems Present (Heart Failure) respiratory compromise       Problem: Fluid Volume Excess (Adult)  Goal: Optimal Fluid Balance  Outcome: Ongoing (interventions implemented as appropriate)   10/22/19 0646   Fluid Volume Excess (Adult)   Optimal Fluid Balance making progress toward outcome       Problem: Fall Risk (Adult)  Goal: Absence of Fall  Outcome: Ongoing (interventions implemented as appropriate)   10/22/19 0646   Fall Risk (Adult)   Absence of Fall making progress toward outcome

## 2019-10-22 NOTE — DISCHARGE SUMMARY
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     Procedures Performed  Procedure(s):  Right and Left Heart Cath     Consults     Date and Time Order Name Status Description    10/18/2019 2332 Inpatient Cardiology Consult      10/18/2019 2201 LHA (on-call MD unless specified) Details Completed     10/18/2019 2144 LCG (on-call MD unless specified) Completed         Hospital Course  Please see history and physical for details. Patient is a 79 y.o. male initially admitted by 1 of my partners for abdominal pain as well as shortness of breath.  To summarize this admission, patient was found to have new onset of systolic and diastolic CHF per echocardiogram.  Patient then underwent cardiac catheterization and no stent was placed though pulmonary hypertension was also noted.  Patient has been adequately diuresed and transitioned over to oral diuretics at this juncture.  He has been counseled extensively in regards to need to decrease salt and improve dietary intake as well as the need for weight loss.  I have also strongly advises patient to follow-up with his PCP as he strongly needs an outpatient sleep study as this could ultimately be etiology driving a lot of the above issues.  Will be up to this patient to implement lifestyle changes which probably should happen a decade prior.  He did suffer some nonsustained V. tach  though cardiology is okay with disposition at this junction.  I think his abdominal pain was likely referred from his cardiac etiology.  Blood pressures are tolerated all changes and creatinine is also tolerated everything is well and by discharge his sodium was 137 with a potassium 3.8 bicarbonate 27 and a creatinine of 0.75.  He had a pre-existing diagnosis of diabetes prior to admission with an A1c of 6.2.  His metformin was on hold while inpatient though safe to resume post discharge and will hold continuation of sliding scale post discharge.  All questions answered to patient more so to spouse at bedside prior to disposition.  Case also discussed in multidisciplinary rounds to try to ensure a smooth transition out of the hospital.  Questions will be filled here since patient is from out of state to ensure he gets all his appropriate medications prior to his travels back to [place].     Echocardiogram report   Interpretation Summary     · The left ventricular cavity is moderately dilated.  · There is severe global hypokinesis.  · Calculated EF = 23.0%. Estimated EF was in agreement with the calculated EF  · Left ventricular diastolic dysfunction is noted (grade II w/high LAP) consistent with pseudonormalization.  · Right ventricular cavity is mild-to-moderately dilated. Mildly reduced right ventricular systolic function noted.  · Left atrial cavity size is moderate-to-severely dilated.  · Right atrial cavity size is moderate-to-severely dilated.  · The mitral valve leaflets are thickened, calcified, and mildly rheumatic in appearance.  · No significant mitral valve stenosis is present  · Mild-to-moderate mitral valve regurgitation is present.  · Mild to moderate tricuspid valve regurgitation is present.  · Calculated right ventricular systolic pressure from tricuspid regurgitation is 52 mmHg.  · There is no evidence of pericardial effusion.        Cardiac catheterization report  CORONARY ANGIOGRAPHY:   1.   Left main artery: 0% stenosis.  The vessel trifurcates into left anterior descending, ramus intermedius, and left circumflex arteries.  2.  Left anterior descending artery: Severe proximal calcification with 0% stenosis.  Mid vessel gives rise to a small caliber first diagonal branch with 30% ostial stenosis.  Mid vessel has 30% stenosis.  The remainder the mid to distal vessel has 0% stenosis.  3.  Ramus intermedius: Moderate caliber vessel with 30% proximal stenosis.  4.  Left circumflex artery: Moderate caliber vessel with 30% proximal and mid stenosis.  Distal vessel gives rise to large caliber first obtuse marginal branch with 80% ostial stenosis.  Distal continuation circumflex vessel tapers to a small vessel and has no significant disease.  5.  Right coronary artery: The vessel appears large caliber approximately with 0% stenosis.  There is severe diffuse calcification noted of the mid to distal vessel.  There is stable appearing, diffuse 60 to 70% stenosis of the mid vessel.  Distal vessel has diffuse 10% stenosis.  Distal vessel then bifurcates into a large posterior descending and posterolateral branches both with no significant disease.  This is a right dominant system.     LEFT VENTRICULAR HEMODYANMICS:   1.  Left ventricular pressure: 138/29  2.  Aortic pressure: 137/85     POST-PROCEDURE DIAGNOSIS:   1. Non-ischemic cardiomyopathy  2. Moderate non-obstructive coronary artery   3. Elevated left ventricular filling pressures  4. Severe pulmonary hypertension     RECOMMENDATIONS:   Continue diuresis.  Medical management of coronary artery disease and cardiomyopathy.    Condition on Discharge: Improved.     Temp:  [97.5 °F (36.4 °C)-98.5 °F (36.9 °C)] 97.6 °F (36.4 °C)  Heart Rate:  [] 98  Resp:  [18-20] 20  BP: (100-139)/(59-92) 118/69  Body mass index is 40.92 kg/m².    Physical Exam   Constitutional: He is oriented to person, place, and time. He appears well-developed and well-nourished.   HENT:    Head: Normocephalic.   Eyes: Conjunctivae are normal. Pupils are equal, round, and reactive to light.   Neck:   Unable to really visualize neck and JVD secondary to body habitus   Cardiovascular: Normal rate and regular rhythm.   Pulmonary/Chest: Effort normal and breath sounds normal.   Abdominal: Soft. Bowel sounds are normal. There is no tenderness.   Musculoskeletal: He exhibits no edema.   Neurological: He is alert and oriented to person, place, and time.   Psychiatric: He has a normal mood and affect. His behavior is normal.        Discharge Medications      New Medications      Instructions Start Date   carvedilol 6.25 MG tablet  Commonly known as:  COREG   6.25 mg, Oral, 2 Times Daily With Meals      furosemide 40 MG tablet  Commonly known as:  LASIX   40 mg, Oral, 2 Times Daily      potassium chloride 20 MEQ CR tablet  Commonly known as:  K-DUR,KLOR-CON   20 mEq, Oral, Daily         Continue These Medications      Instructions Start Date   aspirin 81 MG chewable tablet   81 mg, Oral      lisinopril 20 MG tablet  Commonly known as:  PRINIVIL,ZESTRIL   20 mg, Oral, Every Night at Bedtime      metFORMIN 500 MG tablet  Commonly known as:  GLUCOPHAGE   500 mg, Oral, 2 Times Daily With Meals      PrilOSEC OTC 20 MG EC tablet  Generic drug:  omeprazole OTC   20 mg, Oral, Daily      simvastatin 20 MG tablet  Commonly known as:  ZOCOR   20 mg, Oral, Every Evening         Stop These Medications    metoprolol tartrate 25 MG tablet  Commonly known as:  LOPRESSOR             Additional Instructions for the Follow-ups that You Need to Schedule     Discharge Follow-up with PCP   As directed       Currently Documented PCP:    Mic Rangel MD    PCP Phone Number:    [phone]     Follow Up Details:  pcp 1-2 weeks upon return to [place]           Follow-up Information     Mic Rangel MD .    Why:  pcp 1-2 weeks upon return to [place]  Contact information:  [address] [place] [address]  [id number]  [phone]                 Test Results Pending at Discharge     José Manuel Arango MD  10/22/19  10:17 AM    Discharge time spent greater than 30 minutes.

## 2019-10-22 NOTE — PROGRESS NOTES
Clinical Pharmacy Services: Medication History    Augustine Chen is a 79 y.o. male presenting to [facility] for CHF (congestive heart failure) (CMS/HCC) [I50.9]  Lower abdominal pain [R10.30]  Ventricular tachycardia, non-sustained (CMS/HCC) [I47.2]  Acute congestive heart failure, unspecified heart failure type (CMS/HCC) [I50.9]    He  has a past medical history of Diabetes mellitus (CMS/HCC), Hyperlipidemia, and Hypertension.    Allergies as of 10/18/2019 - Reviewed 10/18/2019   Allergen Reaction Noted   • Azithromycin Swelling 10/18/2019       Medication information was obtained from: Patient and spouse  Pharmacy and Phone Number: [phone] in [place], [place]    Prior to Admission Medications       Prescriptions Last Dose Informant Patient Reported? Taking?    aspirin 81 MG chewable tablet   Yes Yes    Chew 81 mg.    lisinopril (PRINIVIL,ZESTRIL) 20 MG tablet   Yes Yes    Take 20 mg by mouth every night at bedtime.    metFORMIN (GLUCOPHAGE) 500 MG tablet   Yes Yes    Take 500 mg by mouth 2 (Two) Times a Day With Meals.    metoprolol tartrate (LOPRESSOR) 25 MG tablet  Self Yes Yes    Take 25 mg by mouth Daily.    omeprazole OTC (PRILOSEC OTC) 20 MG EC tablet  Self Yes Yes    Take 20 mg by mouth Daily As Needed.    raNITIdine (ZANTAC) 75 MG tablet  Self Yes Yes    Take 75 mg by mouth Daily As Needed for Indigestion or Heartburn.    simvastatin (ZOCOR) 20 MG tablet   Yes Yes    Take 20 mg by mouth Every Evening.                Medication notes:  • Added OTC Zantac daily PRN for heartburn/indigestion.   • Changed metoprolol tartrate 25 mg to once daily per patient and spouse.    This medication list is complete to the best of my knowledge as of 10/22/2019    Please call if questions.    Brendan Bustillos, PharmD  10/22/2019 12:13 PM

## 2019-10-23 ENCOUNTER — READMISSION MANAGEMENT (OUTPATIENT)
Dept: CALL CENTER | Facility: HOSPITAL | Age: 79
End: 2019-10-23

## 2019-10-24 ENCOUNTER — READMISSION MANAGEMENT (OUTPATIENT)
Dept: CALL CENTER | Facility: HOSPITAL | Age: 79
End: 2019-10-24

## 2019-10-24 NOTE — OUTREACH NOTE
CHF Week 1 Survey      Responses   Facility patient discharged from?  [facility]   Does the patient have one of the following disease processes/diagnoses(primary or secondary)?  CHF   Is there a successful TCM telephone encounter documented?  No   CHF Week 1 attempt successful?  Yes   Call start time  1119   Call end time  1128   Discharge diagnosis  new onset CHF, heart cath    Meds reviewed with patient/caregiver?  Yes   Is the patient having any side effects they believe may be caused by any medication additions or changes?  No   Does the patient have all medications ordered at discharge?  Yes   Is the patient taking all medications as directed (includes completed medication regime)?  Yes   Does the patient have a primary care provider?   Yes   Does the patient have an appointment with their PCP within 7 days of discharge?  Yes   Comments regarding PCP  Has PCP in [facility]- wife reports he has followup scheduled   Has the patient kept scheduled appointments due by today?  N/A   Has home health visited the patient within 72 hours of discharge?  N/A   Psychosocial issues?  No   Did the patient receive a copy of their discharge instructions?  Yes   Nursing interventions  Reviewed instructions with patient   What is the patient's perception of their health status since discharge?  Improving   Nursing interventions  Nurse provided patient education   Is the patient weighing daily?  Yes   Does the patient have scales?  Yes   Daily weight interventions  Education provided on importance of daily weight   Is the patient able to teach back Heart Failure diet management?  Yes   Is the patient able to teach back Heart Failure Zones?  Yes   Is the patient able to teach back signs and symptoms of worsening condition? (i.e. weight gain, shortness of air, etc.)  Yes   Is the patient/caregiver able to teach back the hierarchy of who to call/visit for symptoms/problems? PCP, Specialist, Home health nurse, Urgent Care, ED,  [phone]  Yes    CHF Week 1 call completed?  Yes   Revoked  No further contact(revokes)-requires comment   Graduated/Revoked comments  [place]. No further calls needed .           Catrachito Lucero RN

## 2019-10-24 NOTE — OUTREACH NOTE
Prep Survey      Responses   Facility patient discharged from?  [facility]   Is patient eligible?  Yes   Discharge diagnosis  new onset CHF, heart cath    Does the patient have one of the following disease processes/diagnoses(primary or secondary)?  CHF   Does the patient have Home health ordered?  No   Is there a DME ordered?  No   Prep survey completed?  Yes          Breanna Villafana RN

## 2020-12-07 ENCOUNTER — OFFICE VISIT (OUTPATIENT)
Dept: INTERNAL MEDICINE | Facility: CLINIC | Age: 80
End: 2020-12-07

## 2020-12-07 ENCOUNTER — LAB (OUTPATIENT)
Dept: LAB | Facility: HOSPITAL | Age: 80
End: 2020-12-07

## 2020-12-07 VITALS
OXYGEN SATURATION: 98 % | DIASTOLIC BLOOD PRESSURE: 70 MMHG | HEART RATE: 76 BPM | BODY MASS INDEX: 39.65 KG/M2 | WEIGHT: 246.7 LBS | HEIGHT: 66 IN | SYSTOLIC BLOOD PRESSURE: 116 MMHG

## 2020-12-07 DIAGNOSIS — Z00.00 MEDICARE ANNUAL WELLNESS VISIT, INITIAL: Primary | ICD-10-CM

## 2020-12-07 DIAGNOSIS — E78.2 MIXED HYPERLIPIDEMIA: ICD-10-CM

## 2020-12-07 DIAGNOSIS — I10 ESSENTIAL HYPERTENSION: ICD-10-CM

## 2020-12-07 DIAGNOSIS — E11.59 TYPE 2 DIABETES MELLITUS WITH OTHER CIRCULATORY COMPLICATION, WITHOUT LONG-TERM CURRENT USE OF INSULIN (HCC): ICD-10-CM

## 2020-12-07 DIAGNOSIS — I50.41 ACUTE COMBINED SYSTOLIC AND DIASTOLIC CONGESTIVE HEART FAILURE (HCC): ICD-10-CM

## 2020-12-07 LAB
ALBUMIN SERPL-MCNC: 4.1 G/DL (ref 3.5–5.2)
ALBUMIN UR-MCNC: <1.2 MG/DL
ALBUMIN/GLOB SERPL: 1.6 G/DL
ALP SERPL-CCNC: 56 U/L (ref 39–117)
ALT SERPL W P-5'-P-CCNC: 22 U/L (ref 1–41)
ANION GAP SERPL CALCULATED.3IONS-SCNC: 11.6 MMOL/L (ref 5–15)
AST SERPL-CCNC: 22 U/L (ref 1–40)
BASOPHILS # BLD AUTO: 0.04 10*3/MM3 (ref 0–0.2)
BASOPHILS NFR BLD AUTO: 0.6 % (ref 0–1.5)
BILIRUB SERPL-MCNC: 0.4 MG/DL (ref 0–1.2)
BUN SERPL-MCNC: 12 MG/DL (ref 8–23)
BUN/CREAT SERPL: 10.5 (ref 7–25)
CALCIUM SPEC-SCNC: 9.5 MG/DL (ref 8.6–10.5)
CHLORIDE SERPL-SCNC: 102 MMOL/L (ref 98–107)
CHOLEST SERPL-MCNC: 137 MG/DL (ref 0–200)
CO2 SERPL-SCNC: 23.4 MMOL/L (ref 22–29)
CREAT SERPL-MCNC: 1.14 MG/DL (ref 0.76–1.27)
CREAT UR-MCNC: 134.9 MG/DL
DEPRECATED RDW RBC AUTO: 45.8 FL (ref 37–54)
EOSINOPHIL # BLD AUTO: 0.12 10*3/MM3 (ref 0–0.4)
EOSINOPHIL NFR BLD AUTO: 1.8 % (ref 0.3–6.2)
ERYTHROCYTE [DISTWIDTH] IN BLOOD BY AUTOMATED COUNT: 13.2 % (ref 12.3–15.4)
GFR SERPL CREATININE-BSD FRML MDRD: 62 ML/MIN/1.73
GLOBULIN UR ELPH-MCNC: 2.6 GM/DL
GLUCOSE SERPL-MCNC: 186 MG/DL (ref 65–99)
HBA1C MFR BLD: 7.45 % (ref 4.8–5.6)
HCT VFR BLD AUTO: 44.4 % (ref 37.5–51)
HDLC SERPL-MCNC: 38 MG/DL (ref 40–60)
HGB BLD-MCNC: 15 G/DL (ref 13–17.7)
IMM GRANULOCYTES # BLD AUTO: 0.02 10*3/MM3 (ref 0–0.05)
IMM GRANULOCYTES NFR BLD AUTO: 0.3 % (ref 0–0.5)
LDLC SERPL CALC-MCNC: 65 MG/DL (ref 0–100)
LDLC/HDLC SERPL: 1.53 {RATIO}
LYMPHOCYTES # BLD AUTO: 1.66 10*3/MM3 (ref 0.7–3.1)
LYMPHOCYTES NFR BLD AUTO: 24.4 % (ref 19.6–45.3)
MCH RBC QN AUTO: 32.1 PG (ref 26.6–33)
MCHC RBC AUTO-ENTMCNC: 33.8 G/DL (ref 31.5–35.7)
MCV RBC AUTO: 95.1 FL (ref 79–97)
MICROALBUMIN/CREAT UR: NORMAL MG/G{CREAT}
MONOCYTES # BLD AUTO: 0.78 10*3/MM3 (ref 0.1–0.9)
MONOCYTES NFR BLD AUTO: 11.5 % (ref 5–12)
NEUTROPHILS NFR BLD AUTO: 4.17 10*3/MM3 (ref 1.7–7)
NEUTROPHILS NFR BLD AUTO: 61.4 % (ref 42.7–76)
NRBC BLD AUTO-RTO: 0 /100 WBC (ref 0–0.2)
PLATELET # BLD AUTO: 244 10*3/MM3 (ref 140–450)
PMV BLD AUTO: 9.9 FL (ref 6–12)
POTASSIUM SERPL-SCNC: 4.4 MMOL/L (ref 3.5–5.2)
PROT SERPL-MCNC: 6.7 G/DL (ref 6–8.5)
RBC # BLD AUTO: 4.67 10*6/MM3 (ref 4.14–5.8)
SODIUM SERPL-SCNC: 137 MMOL/L (ref 136–145)
T-UPTAKE NFR SERPL: 0.99 TBI (ref 0.8–1.3)
T4 SERPL-MCNC: 5.41 MCG/DL (ref 4.5–11.7)
TRIGL SERPL-MCNC: 205 MG/DL (ref 0–150)
TSH SERPL DL<=0.05 MIU/L-ACNC: 2.64 UIU/ML (ref 0.27–4.2)
VLDLC SERPL-MCNC: 34 MG/DL (ref 5–40)
WBC # BLD AUTO: 6.79 10*3/MM3 (ref 3.4–10.8)

## 2020-12-07 PROCEDURE — 36415 COLL VENOUS BLD VENIPUNCTURE: CPT | Performed by: FAMILY MEDICINE

## 2020-12-07 PROCEDURE — 80061 LIPID PANEL: CPT | Performed by: FAMILY MEDICINE

## 2020-12-07 PROCEDURE — 83036 HEMOGLOBIN GLYCOSYLATED A1C: CPT | Performed by: FAMILY MEDICINE

## 2020-12-07 PROCEDURE — 84443 ASSAY THYROID STIM HORMONE: CPT | Performed by: FAMILY MEDICINE

## 2020-12-07 PROCEDURE — G0438 PPPS, INITIAL VISIT: HCPCS | Performed by: FAMILY MEDICINE

## 2020-12-07 PROCEDURE — 85025 COMPLETE CBC W/AUTO DIFF WBC: CPT | Performed by: FAMILY MEDICINE

## 2020-12-07 PROCEDURE — 84436 ASSAY OF TOTAL THYROXINE: CPT | Performed by: FAMILY MEDICINE

## 2020-12-07 PROCEDURE — 99214 OFFICE O/P EST MOD 30 MIN: CPT | Performed by: FAMILY MEDICINE

## 2020-12-07 PROCEDURE — 82043 UR ALBUMIN QUANTITATIVE: CPT | Performed by: FAMILY MEDICINE

## 2020-12-07 PROCEDURE — 84479 ASSAY OF THYROID (T3 OR T4): CPT | Performed by: FAMILY MEDICINE

## 2020-12-07 PROCEDURE — 80053 COMPREHEN METABOLIC PANEL: CPT | Performed by: FAMILY MEDICINE

## 2020-12-07 PROCEDURE — 82570 ASSAY OF URINE CREATININE: CPT | Performed by: FAMILY MEDICINE

## 2020-12-07 RX ORDER — METOPROLOL SUCCINATE 100 MG/1
100 TABLET, EXTENDED RELEASE ORAL 2 TIMES DAILY
COMMUNITY
Start: 2020-10-13 | End: 2022-04-05 | Stop reason: SDUPTHER

## 2020-12-07 RX ORDER — DAPAGLIFLOZIN AND METFORMIN HYDROCHLORIDE 10; 1000 MG/1; MG/1
1 TABLET, FILM COATED, EXTENDED RELEASE ORAL DAILY
Qty: 30 TABLET | Refills: 11 | Status: SHIPPED | OUTPATIENT
Start: 2020-12-07 | End: 2021-02-09

## 2020-12-07 RX ORDER — DIGOXIN 125 MCG
125 TABLET ORAL DAILY
COMMUNITY
Start: 2020-10-13 | End: 2021-11-17 | Stop reason: SDUPTHER

## 2020-12-07 RX ORDER — GLIPIZIDE 5 MG/1
5 TABLET ORAL
COMMUNITY
End: 2021-08-06 | Stop reason: DRUGHIGH

## 2020-12-07 RX ORDER — METOPROLOL SUCCINATE 25 MG/1
25 TABLET, EXTENDED RELEASE ORAL DAILY
COMMUNITY
Start: 2020-10-13 | End: 2021-09-07 | Stop reason: SDUPTHER

## 2020-12-07 NOTE — PROGRESS NOTES
The ABCs of the Annual Wellness Visit  Initial Medicare Wellness Visit    Chief Complaint   Patient presents with   • new patient visit   • Hypertension   • Diabetes   • Hyperlipidemia   • Medicare Wellness-subsequent       Subjective   History of Present Illness:  Augustine Chen is a 80 y.o. male who presents for an Initial Medicare Wellness Visit.    Patient has DM2, and is currently taking metformin 500mg daily. He is also doing glipizide 5mg bid.  Previous hba1c has been elvated in the past. He is trying to monitor his diet and exercise. He doesn't have any side effects of any medications.     Patient also has hyperlipidemia, and is currently taking simvastatin 20mg daily. Patient denies any side effects of the medication.    Patient also has essential hypertension. Patient is also taking lisinopril 20mg daily, metoprolol 125mg daily, and carvedilol 6.25mg bid. He was placed on all of these medications by his cardiologist before.    HEALTH RISK ASSESSMENT    Recent Hospitalizations:  No hospitalization(s) within the last year.    Current Medical Providers:  Patient Care Team:  Thony Mclean MD as PCP - General (Family Medicine)    Smoking Status:  Social History     Tobacco Use   Smoking Status Never Smoker   Smokeless Tobacco Never Used       Alcohol Consumption:  Social History     Substance and Sexual Activity   Alcohol Use No   • Frequency: Never       Depression Screen:   PHQ-2/PHQ-9 Depression Screening 12/7/2020   Little interest or pleasure in doing things 0   Feeling down, depressed, or hopeless 0   Total Score 0       Fall Risk Screen:  BARBARA Fall Risk Assessment was completed, and patient is at LOW risk for falls.Assessment completed on:12/7/2020    Health Habits and Functional and Cognitive Screening:  Functional & Cognitive Status 12/7/2020   Do you have difficulty preparing food and eating? No   Do you have difficulty bathing yourself, getting dressed or grooming yourself? No   Do you have  difficulty using the toilet? No   Do you have difficulty moving around from place to place? No   Do you have trouble with steps or getting out of a bed or a chair? No   Current Diet Well Balanced Diet   Dental Exam Up to date   Eye Exam Up to date   Exercise (times per week) 3 times per week   Current Exercises Include Walking   Do you need help using the phone?  No   Are you deaf or do you have serious difficulty hearing?  Yes   Do you need help with transportation? No   Do you need help shopping? No   Do you need help preparing meals?  No   Do you need help with housework?  No   Do you need help with laundry? No   Do you need help taking your medications? No   Do you need help managing money? No   Do you ever drive or ride in a car without wearing a seat belt? No   Have you felt unusual stress, anger or loneliness in the last month? No   Who do you live with? Spouse   If you need help, do you have trouble finding someone available to you? No   Have you been bothered in the last four weeks by sexual problems? No   Do you have difficulty concentrating, remembering or making decisions? No         Does the patient have evidence of cognitive impairment? No    Asprin use counseling:Taking ASA appropriately as indicated    Age-appropriate Screening Schedule:  Refer to the list below for future screening recommendations based on patient's age, sex and/or medical conditions. Orders for these recommended tests are listed in the plan section. The patient has been provided with a written plan.    Health Maintenance   Topic Date Due   • ZOSTER VACCINE (1 of 2) 05/31/1990   • DIABETIC EYE EXAM  10/23/2019   • INFLUENZA VACCINE  08/01/2020   • URINE MICROALBUMIN  08/07/2020   • HEMOGLOBIN A1C  04/07/2021   • LIPID PANEL  10/07/2021   • TDAP/TD VACCINES (3 - Td) 06/01/2027          The following portions of the patient's history were reviewed and updated as appropriate: allergies, current medications, past family history, past  medical history, past social history, past surgical history and problem list.    Outpatient Medications Prior to Visit   Medication Sig Dispense Refill   • apixaban (ELIQUIS) 5 MG tablet tablet Take 5 mg by mouth.     • aspirin 81 MG chewable tablet Chew 81 mg.     • digoxin (LANOXIN) 125 MCG tablet Take 125 mcg by mouth Daily.     • furosemide (LASIX) 40 MG tablet Take 1 tablet by mouth 2 (Two) Times a Day. 60 tablet 0   • glipizide (GLUCOTROL) 5 MG tablet Take 5 mg by mouth 2 (Two) Times a Day Before Meals.     • lisinopril (PRINIVIL,ZESTRIL) 20 MG tablet Take 20 mg by mouth every night at bedtime.  1   • metoprolol succinate XL (TOPROL-XL) 100 MG 24 hr tablet Take 100 mg by mouth.     • metoprolol succinate XL (TOPROL-XL) 25 MG 24 hr tablet Take 25 mg by mouth Daily.     • omeprazole OTC (PRILOSEC OTC) 20 MG EC tablet Take 20 mg by mouth Daily As Needed.     • potassium chloride (K-DUR,KLOR-CON) 20 MEQ CR tablet Take 1 tablet by mouth Daily. 30 tablet 0   • simvastatin (ZOCOR) 20 MG tablet Take 20 mg by mouth Every Evening.  3   • metFORMIN (GLUCOPHAGE) 500 MG tablet Take 500 mg by mouth Daily With Breakfast.  3   • carvedilol (COREG) 6.25 MG tablet Take 1 tablet by mouth 2 (Two) Times a Day With Meals. 60 tablet 0   • raNITIdine (ZANTAC) 75 MG tablet Take 75 mg by mouth Daily As Needed for Indigestion or Heartburn.       No facility-administered medications prior to visit.        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 Patient has an advance directive (not in EMR), copy requested.    Review of Systems    Compared to one year  "ago, the patient feels his physical health is better.  Compared to one year ago, the patient feels his mental health is better.    Reviewed chart for potential of high risk medication in the elderly: yes  Reviewed chart for potential of harmful drug interactions in the elderly:yes    Objective         Vitals:    12/07/20 0842   BP: 116/70   BP Location: Left arm   Patient Position: Sitting   Cuff Size: Adult   Pulse: 76   SpO2: 98%   Weight: 112 kg (246 lb 11.2 oz)   Height: 167.6 cm (66\")   PainSc:   2       Body mass index is 39.82 kg/m².  Discussed the patient's BMI with him. The BMI is in the acceptable range.    Physical Exam    Lab Results   Component Value Date    HGBA1C 8.6 (H) 10/07/2020        Assessment/Plan   Medicare Risks and Personalized Health Plan  CMS Preventative Services Quick Reference  Cardiovascular risk    The above risks/problems have been discussed with the patient.  Pertinent information has been shared with the patient in the After Visit Summary.  Follow up plans and orders are seen below in the Assessment/Plan Section.    Diagnoses and all orders for this visit:    1. Medicare annual wellness visit, initial (Primary)    2. Type 2 diabetes mellitus with other circulatory complication, without long-term current use of insulin (CMS/[facility])  -     Hemoglobin A1c  -     Thyroid Panel With TSH  -     dapagliflozin-metformin HCl ER (Xigduo XR)  MG tablet; Take 1 tablet by mouth Daily.  Dispense: 30 tablet; Refill: 11  -     Microalbumin / Creatinine Urine Ratio - Urine, Clean Catch  -     Stop metformin. Start xigduo 10-1000mg daily. Will conginue glipizide.     3. Essential hypertension  -     CBC & Differential  -     Comprehensive Metabolic Panel  -     Currently stable, will continue current medications.     4. Mixed hyperlipidemia  -     Lipid Panel With LDL / HDL Ratio  -     Will check lipid levels, continue simvastatin.     5. Acute combined systolic and diastolic congestive heart " failure (CMS/[facility])  -     Ambulatory Referral to Cardiology      Follow Up:  No follow-ups on file.     An After Visit Summary and PPPS were given to the patient.

## 2020-12-12 NOTE — PROGRESS NOTES
Please inform the patient of the following abnormal results.  hba1c improving, continue current tx.

## 2020-12-15 ENCOUNTER — OFFICE VISIT (OUTPATIENT)
Dept: CARDIOLOGY | Facility: CLINIC | Age: 80
End: 2020-12-15

## 2020-12-15 VITALS
HEART RATE: 97 BPM | HEIGHT: 66 IN | DIASTOLIC BLOOD PRESSURE: 70 MMHG | OXYGEN SATURATION: 98 % | SYSTOLIC BLOOD PRESSURE: 120 MMHG | WEIGHT: 240 LBS | BODY MASS INDEX: 38.57 KG/M2

## 2020-12-15 DIAGNOSIS — I42.8 NICM (NONISCHEMIC CARDIOMYOPATHY) (HCC): ICD-10-CM

## 2020-12-15 DIAGNOSIS — E78.00 HYPERCHOLESTEROLEMIA: ICD-10-CM

## 2020-12-15 DIAGNOSIS — Z79.01 CHRONIC ANTICOAGULATION: ICD-10-CM

## 2020-12-15 DIAGNOSIS — I48.19 ATRIAL FIBRILLATION, PERSISTENT (HCC): ICD-10-CM

## 2020-12-15 DIAGNOSIS — I27.20 PULMONARY HYPERTENSION (HCC): Primary | ICD-10-CM

## 2020-12-15 DIAGNOSIS — E66.01 SEVERE OBESITY (BMI 35.0-39.9) WITH COMORBIDITY (HCC): ICD-10-CM

## 2020-12-15 PROCEDURE — 93000 ELECTROCARDIOGRAM COMPLETE: CPT | Performed by: INTERNAL MEDICINE

## 2020-12-15 PROCEDURE — 99214 OFFICE O/P EST MOD 30 MIN: CPT | Performed by: INTERNAL MEDICINE

## 2020-12-15 NOTE — PROGRESS NOTES
PATIENTINFORMATION    Date of Office Visit: 12/15/2020  Encounter Provider: Piotr Montemayor MD  Place of Service: [facility] CARDIOLOGY  Patient Name: Augustine Chen  : 1940    Subjective:     Encounter Date:12/15/2020      Patient ID: Augustine Chen is a 80 y.o. male.    Chief Complaint   Patient presents with   • Cardiomyopathy     new patient    • Congestive Heart Failure   • Hyperlipidemia   • Hypertension     HPI  Mr. Chen is an 80 years old man with past medical history of nonischemic cardiomyopathy, persistent atrial fibrillation, morbid obesity, nonobstructive coronary artery disease, hypertension, type 2 diabetes mellitus, chronic anticoagulation came to cardiology clinic to establish care.  Patient was seen here in hospital consultation back in 2019 after presenting with shortness of breath.  Subsequently he was diagnosed with systolic congestive heart failure that prompted both right and left heart catheterization that revealed nonobstructive coronary artery disease and elevated left and right atrial filling pressures and pulmonary hypertension.  He was also found out to be in atrial fibrillation with rapid ventricular response.  Since then he has been on guideline directed medical therapy and also anticoagulation. He relocated to [place], [place] but continued to have follow-up with cardiologist there.  No hospitalizations and he reports being compliant with all medications.  He walks for about 20 minutes most days of the week at a slow pace and denied any significant shortness of breath, chest pain.  He also denied any orthopnea, PND, extremity swelling.  He reports he has lost about 15-20 pounds over the last several months modifying his diet and with more activities.  He denied bleeding from any site and denied any dark stool.  Returning to [place] to live with his son.  Last echocardiogram was done in 2020 and at that time calculated left  ventricular ejection fraction with 36.1%  Noted backwards.  ROS   All systems reviewed and negative except as noted in HPI.    Past Medical History:   Diagnosis Date   • Diabetes mellitus (CMS/HCC)    • Hyperlipidemia    • Hypertension        Past Surgical History:   Procedure Laterality Date   • CARDIAC CATHETERIZATION N/A 10/21/2019    Procedure: Right and Left Heart Cath;  Surgeon: Ashley Walker MD;  Location: [facility] CATH INVASIVE LOCATION;  Service: Cardiovascular   • CARDIAC CATHETERIZATION N/A 10/21/2019    Procedure: Coronary angiography;  Surgeon: Ashley Walker MD;  Location:  [facility] CATH INVASIVE LOCATION;  Service: Cardiovascular   • CARDIAC CATHETERIZATION N/A 10/21/2019    Procedure: Left ventriculography- pressures only- no lv injection;  Surgeon: Ashley Walker MD;  Location:  [facility] CATH INVASIVE LOCATION;  Service: Cardiovascular       Social History     Socioeconomic History   • Marital status:      Spouse name: Not on file   • Number of children: Not on file   • Years of education: Not on file   • Highest education level: Not on file   Tobacco Use   • Smoking status: Never Smoker   • Smokeless tobacco: Never Used   Substance and Sexual Activity   • Alcohol use: No     Frequency: Never   • Drug use: No   • Sexual activity: Yes     Partners: Female       Family History   Problem Relation Age of Onset   • Heart disease Mother    • Heart attack Mother    • Heart attack Brother            ECG 12 Lead    Date/Time: 12/15/2020 9:17 AM  Performed by: Piotr Montemayor MD  Authorized by: Piotr Montemayor MD   Comparison: compared with previous ECG from 10/18/2019  Comparison to previous ECG: Patient in atrial fibrillation on this tracing  Rhythm: atrial fibrillation  Rate: normal  Conduction: conduction normal  ST Segments: ST segments normal  T Waves: T waves normal  QRS axis: normal  Other findings: non-specific ST-T wave changes    Clinical impression: abnormal EKG              "  Objective:     /70   Pulse 97   Ht 167.6 cm (66\")   Wt 109 kg (240 lb)   SpO2 98%   BMI 38.74 kg/m²  Body mass index is 38.74 kg/m².     Constitutional:       General: Not in acute distress.     Appearance: Well-developed. Morbidly obese. Not diaphoretic.   Eyes:      Pupils: Pupils are equal, round, and reactive to light.   HENT:      Head: Normocephalic and atraumatic.   Neck:      Musculoskeletal: Normal range of motion and neck supple.      Thyroid: No thyromegaly.   Pulmonary:      Effort: Pulmonary effort is normal. No respiratory distress.      Breath sounds: Normal breath sounds. No wheezing. No rales.   Chest:      Chest wall: Not tender to palpatation.   Cardiovascular:      Normal rate. Irregularly irregular rhythm.      No gallop.   Pulses:     Intact distal pulses.   Edema:     Peripheral edema absent.   Abdominal:      General: Bowel sounds are normal. There is no distension.      Palpations: Abdomen is soft.      Tenderness: There is no guarding.   Musculoskeletal: Normal range of motion.         General: No deformity.   Skin:     General: Skin is warm and dry.      Findings: No rash.   Neurological:      Mental Status: Alert and oriented to person, place, and time.      Cranial Nerves: No cranial nerve deficit.      Deep Tendon Reflexes: Reflexes are normal and symmetric.   Psychiatric:         Judgment: Judgment normal.         Review Of Data: I have obtained reviewed documentations from [place], and prior visits.      Assessment/Plan:         NICM (nonischemic cardiomyopathy) (CMS/HCC)    Atrial fibrillation, persistent (CMS/HCC)    Chronic anticoagulation    Severe obesity (BMI 35.0-39.9) with comorbidity (CMS/HCC)    Hypercholesterolemia    Pulmonary hypertension (CMS/HCC)    Patient is clinically compensated and euvolemic on exam today.  Walks for about 20 minutes every day.  No significant complaints today.  He is not sure about all his medications but the list he brought includes " metoprolol extended release 125 mg p.o. daily, digoxin, apixaban, lisinopril.  He is going to call back clinic with complete list of medications.  He had a lab done few days ago that revealed mild increase in creatinine otherwise normal hemoglobin, fair glycemic control, lipid panel at goal.  Dig level not determined.  No clinical evidence for digit toxicity.  Continue current guideline directed medical therapy and anticoagulation.  Encourage patient to walk even more and modify diet to lose weight.  I offered him a sleep study that he declined for now.  Return to clinic in 6 months and repeat echocardiogram then.    Diagnosis and plan of care discussed with patient and verbalized understanding.           Piotr Montemayor MD  12/15/20  09:44 EST

## 2020-12-21 ENCOUNTER — TELEPHONE (OUTPATIENT)
Dept: INTERNAL MEDICINE | Facility: CLINIC | Age: 80
End: 2020-12-21

## 2020-12-21 DIAGNOSIS — I50.41 ACUTE COMBINED SYSTOLIC AND DIASTOLIC CONGESTIVE HEART FAILURE (HCC): Primary | ICD-10-CM

## 2020-12-21 NOTE — TELEPHONE ENCOUNTER
With this the patient means by the medications being too strong?  His blood glucose levels coming down lower.  His hemoglobin A1c was higher and needed to get it under better control.  I have ordered his digoxin levels to be tested at the patient's convenience.

## 2020-12-21 NOTE — TELEPHONE ENCOUNTER
PATIENT IS CALLING ABOUT HIS NEW DOSAGES OF MEDICATION FOR dapagliflozin-metformin HCl ER (Xigduo XR)  MG    PATIENT IS CALLING BECAUSE THIS MEDICATION IS TO STRONG AND IS CAUSING STOMACH PROBLEMS    PATIENT IS WANTING TO GO BACK TO THE OLD DOSAGE OF METFORMIN    PATIENT HAS STARTED THE OLD DOSAGES OF METFORMIN ON Friday 12/18-20     PATIENT HAD SOME OF HIS OLD DOSAGES OF METFORMIN    PATIENT SAID THAT HIS HEART DOCTOR WANTED HIM TO HAVE A DIGOXIN BLOOD WORK DONE       PT#[phone]

## 2020-12-22 NOTE — TELEPHONE ENCOUNTER
The new dose is causing him stomach issues. He went back on old dose. What do you want me to tell him? Do you want him to do something else?

## 2020-12-23 NOTE — TELEPHONE ENCOUNTER
Patient stated that went back to taking metformin since he has a lot left and said that he does have a follow up appointment scheduled.  He will let the office know if he has any issues.

## 2020-12-23 NOTE — TELEPHONE ENCOUNTER
Most likely the metformin causing him his issues. I had put him on xigduo which a is a combo of farxiga and metformin. Let him know that I would like to see him next month and reevaluate him and we can come up with other options at that time if we need.

## 2021-02-09 ENCOUNTER — OFFICE VISIT (OUTPATIENT)
Dept: INTERNAL MEDICINE | Facility: CLINIC | Age: 81
End: 2021-02-09

## 2021-02-09 ENCOUNTER — LAB (OUTPATIENT)
Dept: LAB | Facility: HOSPITAL | Age: 81
End: 2021-02-09

## 2021-02-09 VITALS
BODY MASS INDEX: 39.05 KG/M2 | HEIGHT: 66 IN | SYSTOLIC BLOOD PRESSURE: 132 MMHG | HEART RATE: 73 BPM | TEMPERATURE: 98.5 F | DIASTOLIC BLOOD PRESSURE: 84 MMHG | WEIGHT: 243 LBS | OXYGEN SATURATION: 98 % | RESPIRATION RATE: 16 BRPM

## 2021-02-09 DIAGNOSIS — I50.41 ACUTE COMBINED SYSTOLIC AND DIASTOLIC CONGESTIVE HEART FAILURE (HCC): ICD-10-CM

## 2021-02-09 DIAGNOSIS — E78.2 MIXED HYPERLIPIDEMIA: ICD-10-CM

## 2021-02-09 DIAGNOSIS — I10 ESSENTIAL HYPERTENSION: ICD-10-CM

## 2021-02-09 DIAGNOSIS — E11.59 TYPE 2 DIABETES MELLITUS WITH OTHER CIRCULATORY COMPLICATION, WITHOUT LONG-TERM CURRENT USE OF INSULIN (HCC): Primary | ICD-10-CM

## 2021-02-09 LAB
ALBUMIN SERPL-MCNC: 3.9 G/DL (ref 3.5–5.2)
ALBUMIN/GLOB SERPL: 1.6 G/DL
ALP SERPL-CCNC: 57 U/L (ref 39–117)
ALT SERPL W P-5'-P-CCNC: 29 U/L (ref 1–41)
ANION GAP SERPL CALCULATED.3IONS-SCNC: 11 MMOL/L (ref 5–15)
AST SERPL-CCNC: 22 U/L (ref 1–40)
BILIRUB SERPL-MCNC: 0.5 MG/DL (ref 0–1.2)
BUN SERPL-MCNC: 15 MG/DL (ref 8–23)
BUN/CREAT SERPL: 16 (ref 7–25)
CALCIUM SPEC-SCNC: 9.2 MG/DL (ref 8.6–10.5)
CHLORIDE SERPL-SCNC: 100 MMOL/L (ref 98–107)
CHOLEST SERPL-MCNC: 133 MG/DL (ref 0–200)
CO2 SERPL-SCNC: 25 MMOL/L (ref 22–29)
CREAT SERPL-MCNC: 0.94 MG/DL (ref 0.76–1.27)
DIGOXIN SERPL-MCNC: 0.5 NG/ML (ref 0.6–1.2)
GFR SERPL CREATININE-BSD FRML MDRD: 77 ML/MIN/1.73
GLOBULIN UR ELPH-MCNC: 2.4 GM/DL
GLUCOSE SERPL-MCNC: 174 MG/DL (ref 65–99)
HBA1C MFR BLD: 7.7 % (ref 4.8–5.6)
HDLC SERPL-MCNC: 41 MG/DL (ref 40–60)
LDLC SERPL CALC-MCNC: 55 MG/DL (ref 0–100)
LDLC/HDLC SERPL: 1.1 {RATIO}
POTASSIUM SERPL-SCNC: 4.4 MMOL/L (ref 3.5–5.2)
PROT SERPL-MCNC: 6.3 G/DL (ref 6–8.5)
SODIUM SERPL-SCNC: 136 MMOL/L (ref 136–145)
TRIGL SERPL-MCNC: 234 MG/DL (ref 0–150)
VLDLC SERPL-MCNC: 37 MG/DL (ref 5–40)

## 2021-02-09 PROCEDURE — 80061 LIPID PANEL: CPT | Performed by: FAMILY MEDICINE

## 2021-02-09 PROCEDURE — 80053 COMPREHEN METABOLIC PANEL: CPT | Performed by: FAMILY MEDICINE

## 2021-02-09 PROCEDURE — 36415 COLL VENOUS BLD VENIPUNCTURE: CPT | Performed by: FAMILY MEDICINE

## 2021-02-09 PROCEDURE — 80162 ASSAY OF DIGOXIN TOTAL: CPT

## 2021-02-09 PROCEDURE — 83036 HEMOGLOBIN GLYCOSYLATED A1C: CPT | Performed by: FAMILY MEDICINE

## 2021-02-09 PROCEDURE — 99214 OFFICE O/P EST MOD 30 MIN: CPT | Performed by: FAMILY MEDICINE

## 2021-02-09 RX ORDER — CANAGLIFLOZIN 100 MG/1
100 TABLET, FILM COATED ORAL DAILY
Qty: 30 TABLET | Refills: 11 | Status: SHIPPED | OUTPATIENT
Start: 2021-02-09 | End: 2021-03-12 | Stop reason: SDUPTHER

## 2021-02-09 NOTE — PROGRESS NOTES
Please inform the patient of the following abnormal results.  Digoxin levels are low, follow-up with cardiology.

## 2021-02-09 NOTE — PROGRESS NOTES
"Chief Complaint  Hypertension    Subjective          Augustine Sample presents to [facility]  History of Present Illness    Patient states that he does have type 2 diabetes.  He is still currently taking glipizide 5 mg daily.  Patient states that he was put on the Xigduo XR  mg daily however he states that it was hurting his stomach so he stopped taking the medicine.  He is still taking Metformin 500 mg daily.  His fasting blood glucose levels have been up ranging in the 190s.    Patient also has essential hypertension.  His blood pressure is 132/84.  He is currently on metoprolol 125 mg twice a day.  He is also taking lisinopril 20 mg daily.  Patient denies any side effects of the medication.    Patient has a history of having hyperlipidemia.  Patient is current taking simvastatin 20 mg daily.  Patient denies any side effects of the medication.       Objective   Vital Signs:   /84   Pulse 73   Temp 98.5 °F (36.9 °C) (Infrared)   Resp 16   Ht 167.6 cm (65.98\")   Wt 110 kg (243 lb)   SpO2 98%   BMI 39.24 kg/m²     Physical Exam  Vitals signs and nursing note reviewed.   Constitutional:       Appearance: He is well-developed.   HENT:      Head: Normocephalic and atraumatic.   Neck:      Musculoskeletal: Normal range of motion and neck supple.   Neurological:      Mental Status: He is alert and oriented to person, place, and time.   Psychiatric:         Behavior: Behavior normal.        Result Review :                 Assessment and Plan    Diagnoses and all orders for this visit:    1. Type 2 diabetes mellitus with other circulatory complication, without long-term current use of insulin (CMS/[facility]) (Primary)  -     Hemoglobin A1c  -     Canagliflozin (Invokana) 100 MG tablet tablet; Take 1 tablet by mouth Daily.  Dispense: 30 tablet; Refill: 11  -     We will continue on Metformin 500 mg daily and glipizide 5 mg twice daily.  Like to start him on Invokana 100 mg " daily.    2. Essential hypertension  -     Comprehensive Metabolic Panel  -     Continue patient on metoprolol 125 mg twice daily and lisinopril 20 mg daily.    3. Mixed hyperlipidemia  -     Lipid Panel With LDL / HDL Ratio  -     Continue simvastatin 20 mg daily.        Follow Up   No follow-ups on file.  Patient was given instructions and counseling regarding his condition or for health maintenance advice. Please see specific information pulled into the AVS if appropriate.

## 2021-02-09 NOTE — PROGRESS NOTES
Please inform the patient of the following abnormal results.  Glycerides continues to elevated.  His hemoglobin A1c has gone up from 7.1 which she showed me in the office to 7.7.  Patient was started on Invokana at today's office visit.  We will see how he does with this new medication.

## 2021-03-04 ENCOUNTER — TELEPHONE (OUTPATIENT)
Dept: INTERNAL MEDICINE | Facility: CLINIC | Age: 81
End: 2021-03-04

## 2021-03-04 DIAGNOSIS — E11.59 TYPE 2 DIABETES MELLITUS WITH OTHER CIRCULATORY COMPLICATION, WITHOUT LONG-TERM CURRENT USE OF INSULIN (HCC): ICD-10-CM

## 2021-03-04 DIAGNOSIS — E11.59 TYPE 2 DIABETES MELLITUS WITH OTHER CIRCULATORY COMPLICATION, WITHOUT LONG-TERM CURRENT USE OF INSULIN (HCC): Primary | ICD-10-CM

## 2021-03-04 NOTE — TELEPHONE ENCOUNTER
His blood sugars are not coming down at all on the medication its running about 160 or higher he's checking it once in the morning.

## 2021-03-04 NOTE — TELEPHONE ENCOUNTER
PATIENT WAS GIVEN MEDICATION SAMPLE(UNSURE OF EXACT NAME) STATES MEDICATION IS NOT WORKING, IS WANTING TO KNOW IF HE SHOULD CONTINUE ON IT, CAN STOP IT OR IF THERE IS ANYTHING ELSE DR WESTON WOULD RECOMMEND. PLEASE ADVISE.           CALL BACK:[phone]

## 2021-03-10 ENCOUNTER — TELEPHONE (OUTPATIENT)
Dept: INTERNAL MEDICINE | Facility: CLINIC | Age: 81
End: 2021-03-10

## 2021-03-10 DIAGNOSIS — E11.59 TYPE 2 DIABETES MELLITUS WITH OTHER CIRCULATORY COMPLICATION, WITHOUT LONG-TERM CURRENT USE OF INSULIN: ICD-10-CM

## 2021-03-10 RX ORDER — CANAGLIFLOZIN 100 MG/1
100 TABLET, FILM COATED ORAL DAILY
Qty: 30 TABLET | Refills: 11 | Status: CANCELLED | OUTPATIENT
Start: 2021-03-10

## 2021-03-10 NOTE — TELEPHONE ENCOUNTER
Patient is requesting a refill on Canagliflozin (Invokana) 100 MG tablet tablet    Patient is also wanting to know if there are samples of this medication.     Patient wants this sent to [facility]  [phone]

## 2021-03-11 ENCOUNTER — PATIENT MESSAGE (OUTPATIENT)
Dept: INTERNAL MEDICINE | Facility: CLINIC | Age: 81
End: 2021-03-11

## 2021-03-11 DIAGNOSIS — E11.59 TYPE 2 DIABETES MELLITUS WITH OTHER CIRCULATORY COMPLICATION, WITHOUT LONG-TERM CURRENT USE OF INSULIN (HCC): ICD-10-CM

## 2021-03-12 RX ORDER — CANAGLIFLOZIN 100 MG/1
100 TABLET, FILM COATED ORAL DAILY
Qty: 30 TABLET | Refills: 11 | Status: SHIPPED | OUTPATIENT
Start: 2021-03-12 | End: 2021-03-16 | Stop reason: SDUPTHER

## 2021-03-14 ENCOUNTER — PATIENT MESSAGE (OUTPATIENT)
Dept: INTERNAL MEDICINE | Facility: CLINIC | Age: 81
End: 2021-03-14

## 2021-03-14 DIAGNOSIS — E11.59 TYPE 2 DIABETES MELLITUS WITH OTHER CIRCULATORY COMPLICATION, WITHOUT LONG-TERM CURRENT USE OF INSULIN (HCC): ICD-10-CM

## 2021-03-17 RX ORDER — CANAGLIFLOZIN 100 MG/1
100 TABLET, FILM COATED ORAL DAILY
Qty: 30 TABLET | Refills: 11 | Status: SHIPPED | OUTPATIENT
Start: 2021-03-17 | End: 2021-05-09

## 2021-05-03 ENCOUNTER — TELEPHONE (OUTPATIENT)
Dept: INTERNAL MEDICINE | Facility: CLINIC | Age: 81
End: 2021-05-03

## 2021-05-03 DIAGNOSIS — Z00.00 HEALTHCARE MAINTENANCE: Primary | ICD-10-CM

## 2021-05-03 NOTE — TELEPHONE ENCOUNTER
Let the patient know that I went ahead and ordered his labs, which he can get done few days before his appointment.

## 2021-05-03 NOTE — TELEPHONE ENCOUNTER
Caller: Augustine Chen    Relationship: Self    Best call back number: [phone]    What orders are you requesting (i.e. lab or imaging): LABS    In what timeframe would the patient need to come in: BY 05/07/2021    Where will you receive your lab/imaging services: [facility]    Additional notes: PATIENT IS REQUESTING TO HAVE LAB ORDERS DONE BY APPOINTMENT ON 05/11/2021. CALLBACK REQUESTED ONCE ORDERS ARE PLACED.

## 2021-05-07 ENCOUNTER — LAB (OUTPATIENT)
Dept: LAB | Facility: HOSPITAL | Age: 81
End: 2021-05-07

## 2021-05-07 LAB
ALBUMIN SERPL-MCNC: 4 G/DL (ref 3.5–5.2)
ALBUMIN/GLOB SERPL: 1.5 G/DL
ALP SERPL-CCNC: 50 U/L (ref 39–117)
ALT SERPL W P-5'-P-CCNC: 29 U/L (ref 1–41)
ANION GAP SERPL CALCULATED.3IONS-SCNC: 15.4 MMOL/L (ref 5–15)
AST SERPL-CCNC: 26 U/L (ref 1–40)
BASOPHILS # BLD AUTO: 0.06 10*3/MM3 (ref 0–0.2)
BASOPHILS NFR BLD AUTO: 0.9 % (ref 0–1.5)
BILIRUB SERPL-MCNC: 0.5 MG/DL (ref 0–1.2)
BUN SERPL-MCNC: 18 MG/DL (ref 8–23)
BUN/CREAT SERPL: 19.6 (ref 7–25)
CALCIUM SPEC-SCNC: 9.7 MG/DL (ref 8.6–10.5)
CHLORIDE SERPL-SCNC: 99 MMOL/L (ref 98–107)
CHOLEST SERPL-MCNC: 150 MG/DL (ref 0–200)
CO2 SERPL-SCNC: 23.6 MMOL/L (ref 22–29)
CREAT SERPL-MCNC: 0.92 MG/DL (ref 0.76–1.27)
DEPRECATED RDW RBC AUTO: 49.8 FL (ref 37–54)
EOSINOPHIL # BLD AUTO: 0.11 10*3/MM3 (ref 0–0.4)
EOSINOPHIL NFR BLD AUTO: 1.6 % (ref 0.3–6.2)
ERYTHROCYTE [DISTWIDTH] IN BLOOD BY AUTOMATED COUNT: 13.3 % (ref 12.3–15.4)
GFR SERPL CREATININE-BSD FRML MDRD: 79 ML/MIN/1.73
GLOBULIN UR ELPH-MCNC: 2.6 GM/DL
GLUCOSE SERPL-MCNC: 169 MG/DL (ref 65–99)
HBA1C MFR BLD: 7.31 % (ref 4.8–5.6)
HCT VFR BLD AUTO: 45 % (ref 37.5–51)
HDLC SERPL-MCNC: 41 MG/DL (ref 40–60)
HGB BLD-MCNC: 15.3 G/DL (ref 13–17.7)
IMM GRANULOCYTES # BLD AUTO: 0.02 10*3/MM3 (ref 0–0.05)
IMM GRANULOCYTES NFR BLD AUTO: 0.3 % (ref 0–0.5)
LDLC SERPL CALC-MCNC: 70 MG/DL (ref 0–100)
LDLC/HDLC SERPL: 1.51 {RATIO}
LYMPHOCYTES # BLD AUTO: 1.86 10*3/MM3 (ref 0.7–3.1)
LYMPHOCYTES NFR BLD AUTO: 26.9 % (ref 19.6–45.3)
MCH RBC QN AUTO: 34.2 PG (ref 26.6–33)
MCHC RBC AUTO-ENTMCNC: 34 G/DL (ref 31.5–35.7)
MCV RBC AUTO: 100.7 FL (ref 79–97)
MONOCYTES # BLD AUTO: 0.76 10*3/MM3 (ref 0.1–0.9)
MONOCYTES NFR BLD AUTO: 11 % (ref 5–12)
NEUTROPHILS NFR BLD AUTO: 4.1 10*3/MM3 (ref 1.7–7)
NEUTROPHILS NFR BLD AUTO: 59.3 % (ref 42.7–76)
NRBC BLD AUTO-RTO: 0 /100 WBC (ref 0–0.2)
PLATELET # BLD AUTO: 252 10*3/MM3 (ref 140–450)
PMV BLD AUTO: 10 FL (ref 6–12)
POTASSIUM SERPL-SCNC: 4.3 MMOL/L (ref 3.5–5.2)
PROT SERPL-MCNC: 6.6 G/DL (ref 6–8.5)
RBC # BLD AUTO: 4.47 10*6/MM3 (ref 4.14–5.8)
SODIUM SERPL-SCNC: 138 MMOL/L (ref 136–145)
T-UPTAKE NFR SERPL: 1.06 TBI (ref 0.8–1.3)
T4 SERPL-MCNC: 5.26 MCG/DL (ref 4.5–11.7)
TRIGL SERPL-MCNC: 236 MG/DL (ref 0–150)
TSH SERPL DL<=0.05 MIU/L-ACNC: 3.19 UIU/ML (ref 0.27–4.2)
VLDLC SERPL-MCNC: 39 MG/DL (ref 5–40)
WBC # BLD AUTO: 6.91 10*3/MM3 (ref 3.4–10.8)

## 2021-05-07 PROCEDURE — 84443 ASSAY THYROID STIM HORMONE: CPT | Performed by: FAMILY MEDICINE

## 2021-05-07 PROCEDURE — 80053 COMPREHEN METABOLIC PANEL: CPT | Performed by: FAMILY MEDICINE

## 2021-05-07 PROCEDURE — 80061 LIPID PANEL: CPT | Performed by: FAMILY MEDICINE

## 2021-05-07 PROCEDURE — 84479 ASSAY OF THYROID (T3 OR T4): CPT | Performed by: FAMILY MEDICINE

## 2021-05-07 PROCEDURE — 84436 ASSAY OF TOTAL THYROXINE: CPT | Performed by: FAMILY MEDICINE

## 2021-05-07 PROCEDURE — 85025 COMPLETE CBC W/AUTO DIFF WBC: CPT | Performed by: FAMILY MEDICINE

## 2021-05-07 PROCEDURE — 36415 COLL VENOUS BLD VENIPUNCTURE: CPT | Performed by: FAMILY MEDICINE

## 2021-05-07 PROCEDURE — 83036 HEMOGLOBIN GLYCOSYLATED A1C: CPT | Performed by: FAMILY MEDICINE

## 2021-08-06 ENCOUNTER — OFFICE VISIT (OUTPATIENT)
Dept: CARDIOLOGY | Facility: CLINIC | Age: 81
End: 2021-08-06

## 2021-08-06 VITALS
WEIGHT: 250 LBS | SYSTOLIC BLOOD PRESSURE: 116 MMHG | BODY MASS INDEX: 40.18 KG/M2 | DIASTOLIC BLOOD PRESSURE: 60 MMHG | HEART RATE: 69 BPM | HEIGHT: 66 IN

## 2021-08-06 DIAGNOSIS — E78.00 HYPERCHOLESTEROLEMIA: ICD-10-CM

## 2021-08-06 DIAGNOSIS — I42.8 NICM (NONISCHEMIC CARDIOMYOPATHY) (HCC): Primary | ICD-10-CM

## 2021-08-06 DIAGNOSIS — I48.19 ATRIAL FIBRILLATION, PERSISTENT (HCC): ICD-10-CM

## 2021-08-06 PROCEDURE — 93000 ELECTROCARDIOGRAM COMPLETE: CPT | Performed by: NURSE PRACTITIONER

## 2021-08-06 PROCEDURE — 99214 OFFICE O/P EST MOD 30 MIN: CPT | Performed by: NURSE PRACTITIONER

## 2021-08-06 RX ORDER — GLIPIZIDE 10 MG/1
10 TABLET ORAL
COMMUNITY
End: 2021-09-07 | Stop reason: SDUPTHER

## 2021-08-06 NOTE — PROGRESS NOTES
"Date of Office Visit: 21  Encounter Provider: AYDEE Marina  Place of Service: [facility]ARD[facility]  Patient Name: Augustine Chen  :1940    Chief Complaint   Patient presents with   • Pulmonary hypertension   • Follow-up   :     HPI: Augustine Chen is a 81 y.o. male  with history of hyperlipidemia, pulmonary hypertension, diabetes mellitus, atrial fibrillation, obesity, nonischemic cardiomyopathy.      He is followed by Dr. Montemayor. I will visit with him for the first time today and have reviewed his medical record.     In 2019 he presented with shortness of breath and he was found to have systolic congestive heart failure which prompted both right and left heart catheterization that revealed nonobstructive coronary artery disease and elevated left and right atrial filling pressure and pulmonary hypertension.  He had atrial fibrillation with rapid ventricular response and was treated with guideline directed medical therapy and anticoagulation.  He then relocated to [place], [place] return to [place] to live with his son and was reestablished.  He had follow-up echo which showed ejection fraction of 36%.  He now presents accompanied by his wife.  He walks daily 30 minutes.  He denies chest pain tightness pressure, palpitation, shortness of breath, edema, near-syncope or syncope.  He weighs himself multiple times a week.  He is having no bleeding issues with apixaban.  He was switched from Invokana to Jardiance since his last visit.    Allergies   Allergen Reactions   • Azithromycin Swelling           Family and social history reviewed.     ROS  All other systems were reviewed and are negative          Objective:     Vitals:    21 0831   BP: 116/60   BP Location: Left arm   Patient Position: Sitting   Pulse: 69   Weight: 113 kg (250 lb)   Height: 167.6 cm (66\")     Body mass index is 40.35 kg/m².    PHYSICAL EXAM:  Constitutional:       General: Not in " acute distress.     Appearance: Well-developed. Not diaphoretic.   HENT:      Head: Normocephalic.   Pulmonary:      Effort: Pulmonary effort is normal. No respiratory distress.      Breath sounds: Examination of the right-lower field reveals decreased breath sounds. Examination of the left-lower field reveals decreased breath sounds. Decreased breath sounds present. No wheezing. No rhonchi. No rales.   Cardiovascular:      Normal rate. Irregularly irregular rhythm.   Pulses:     Radial: 2+ bilaterally.  Skin:     General: Skin is warm and dry. There is no cyanosis.      Findings: No rash.   Neurological:      Mental Status: Alert and oriented to person, place, and time.   Psychiatric:         Behavior: Behavior normal.         Thought Content: Thought content normal.         Judgment: Judgment normal.           ECG 12 Lead    Date/Time: 8/6/2021 8:54 AM  Performed by: Mikayla Acevedo APRN  Authorized by: Mikayla Acevedo APRN   Comparison: compared with previous ECG   Similar to previous ECG  Rhythm: atrial fibrillation  Rate: normal  QRS axis: right    Clinical impression: abnormal EKG              Current Outpatient Medications   Medication Sig Dispense Refill   • apixaban (ELIQUIS) 5 MG tablet tablet Take 5 mg by mouth.     • aspirin 81 MG chewable tablet Chew 81 mg.     • digoxin (LANOXIN) 125 MCG tablet Take 125 mcg by mouth Daily.     • empagliflozin (Jardiance) 25 MG tablet tablet Take  by mouth. Take one half tablet by mouth daily     • furosemide (LASIX) 40 MG tablet Take 1 tablet by mouth 2 (Two) Times a Day. 60 tablet 0   • glipizide (GLUCOTROL) 10 MG tablet Take 10 mg by mouth 2 (Two) Times a Day Before Meals.     • metFORMIN (GLUCOPHAGE) 500 MG tablet Take 500 mg by mouth Daily With Breakfast.     • metoprolol succinate XL (TOPROL-XL) 100 MG 24 hr tablet Take 100 mg by mouth 2 (two) times a day.     • metoprolol succinate XL (TOPROL-XL) 25 MG 24 hr tablet Take 25 mg by mouth Daily.     • omeprazole OTC  (PRILOSEC OTC) 20 MG EC tablet Take 20 mg by mouth Daily As Needed.     • potassium chloride (K-DUR,KLOR-CON) 20 MEQ CR tablet Take 1 tablet by mouth Daily. 30 tablet 0   • simvastatin (ZOCOR) 20 MG tablet Take 20 mg by mouth Every Evening.  3     No current facility-administered medications for this visit.     Assessment:      No diagnosis found.     Orders Placed This Encounter   Procedures   • ECG 12 Lead     This order was created via procedure documentation     Order Specific Question:   Release to patient     Answer:   Immediate         Plan:       1.  81-year-old gentleman with chronic atrial fibrillation rate controlled anticoagulated with apixaban and renal function 3 months ago supports current dosage and continue the same  2.  Systolic congestive heart failure he appears euvolemic.  Continue current regimen call with questions or concerns.  He is monitoring his weights at home multiple times a week which are stable.  3.  Nonischemic cardiomyopathy with nonobstructive coronary artery disease on cardiac catheterization October 2019.  Last ejection fraction 36% June 2020  4.  Systemic hypertension appears controlled  5.  Diabetes mellitus controlled  6.  Pulmonary hypertension clinically he is stable.      Follow-up in 6 months            It has been a pleasure to participate in this patient's care.      Thank you,  AYDEE Marina      **I used Dragon to dictate this note:**

## 2021-09-07 ENCOUNTER — OFFICE VISIT (OUTPATIENT)
Dept: INTERNAL MEDICINE | Facility: CLINIC | Age: 81
End: 2021-09-07

## 2021-09-07 VITALS
WEIGHT: 250 LBS | HEART RATE: 66 BPM | SYSTOLIC BLOOD PRESSURE: 112 MMHG | RESPIRATION RATE: 16 BRPM | OXYGEN SATURATION: 97 % | BODY MASS INDEX: 40.35 KG/M2 | DIASTOLIC BLOOD PRESSURE: 62 MMHG

## 2021-09-07 DIAGNOSIS — I10 ESSENTIAL HYPERTENSION: ICD-10-CM

## 2021-09-07 DIAGNOSIS — E78.2 MIXED HYPERLIPIDEMIA: ICD-10-CM

## 2021-09-07 DIAGNOSIS — E11.59 TYPE 2 DIABETES MELLITUS WITH OTHER CIRCULATORY COMPLICATION, WITHOUT LONG-TERM CURRENT USE OF INSULIN (HCC): Primary | ICD-10-CM

## 2021-09-07 PROCEDURE — 99214 OFFICE O/P EST MOD 30 MIN: CPT | Performed by: FAMILY MEDICINE

## 2021-09-07 RX ORDER — LISINOPRIL 20 MG/1
20 TABLET ORAL DAILY
Qty: 90 TABLET | Refills: 3 | Status: SHIPPED | OUTPATIENT
Start: 2021-09-07 | End: 2021-11-04 | Stop reason: SDUPTHER

## 2021-09-07 RX ORDER — METOPROLOL SUCCINATE 25 MG/1
25 TABLET, EXTENDED RELEASE ORAL DAILY
Qty: 30 TABLET | Refills: 11 | Status: SHIPPED | OUTPATIENT
Start: 2021-09-07 | End: 2022-03-21

## 2021-09-07 RX ORDER — GLIPIZIDE 10 MG/1
10 TABLET ORAL
Qty: 180 TABLET | Refills: 3 | Status: SHIPPED | OUTPATIENT
Start: 2021-09-07 | End: 2022-09-06

## 2021-09-07 NOTE — PROGRESS NOTES
Chief Complaint  Diabetes    Subjective          Augustine Sample presents to [facility]  History of Present Illness    Patient presents at today's office visit with a past medical history of having type 2 diabetes. He has stopped taking Invokana. He has been recently started on Jardiance, and it was increased to 25 mg daily. He is also taking glipizide 10 mg twice a day. He denies any side effects of the medication.    Patient also has essential hypertension. He is currently taking Lasix 40 mg twice a day. He is also doing metoprolol succinate  mg twice a day along with the metoprolol succinate still 25 mg daily. He is also on lisinopril 20 mg daily. [facility]    Patient does have hyperlipidemia. Is current taking simvastatin 20 mg daily. He denies any side effects of the medication.    Objective   Vital Signs:   /62   Pulse 66   Resp 16   Wt 113 kg (250 lb)   SpO2 97%   BMI 40.35 kg/m²     Physical Exam  Vitals and nursing note reviewed.   Constitutional:       Appearance: He is well-developed.   HENT:      Head: Normocephalic and atraumatic.   Musculoskeletal:      Cervical back: Normal range of motion and neck supple.   Neurological:      Mental Status: He is alert and oriented to person, place, and time.   Psychiatric:         Behavior: Behavior normal.        Result Review :                 Assessment and Plan    Diagnoses and all orders for this visit:    1. Type 2 diabetes mellitus with other circulatory complication, without long-term current use of insulin (CMS/[facility]) (Primary)  -     glipizide (GLUCOTROL) 10 MG tablet; Take 1 tablet by mouth 2 (Two) Times a Day Before Meals.  Dispense: 180 tablet; Refill: 3  -     Hemoglobin A1c  -     We will check his hemoglobin A1c.  Continue taking glipizide 10 mg twice daily, Jardiance 25 mg daily, and Metformin 500 mg daily.    2. Essential hypertension  -     lisinopril  (PRINIVIL,ZESTRIL) 20 MG tablet; Take 1 tablet by mouth Daily.  Dispense: 90 tablet; Refill: 3  -     metoprolol succinate XL (TOPROL-XL) 25 MG 24 hr tablet; Take 1 tablet by mouth Daily.  Dispense: 30 tablet; Refill: 11  -     CBC & Differential  -     Comprehensive Metabolic Panel  -     Stable, continue lisinopril and metoprolol succinate XL.    3. Mixed hyperlipidemia  -     Lipid Panel With LDL / HDL Ratio  -     Stable, continue simvastatin 20 mg daily.        Follow Up   No follow-ups on file.  Patient was given instructions and counseling regarding his condition or for health maintenance advice. Please see specific information pulled into the AVS if appropriate.

## 2021-09-10 ENCOUNTER — LAB (OUTPATIENT)
Dept: LAB | Facility: HOSPITAL | Age: 81
End: 2021-09-10

## 2021-09-10 LAB
ALBUMIN SERPL-MCNC: 4 G/DL (ref 3.5–5.2)
ALBUMIN/GLOB SERPL: 1.8 G/DL
ALP SERPL-CCNC: 46 U/L (ref 39–117)
ALT SERPL W P-5'-P-CCNC: 36 U/L (ref 1–41)
ANION GAP SERPL CALCULATED.3IONS-SCNC: 11.9 MMOL/L (ref 5–15)
AST SERPL-CCNC: 25 U/L (ref 1–40)
BASOPHILS # BLD AUTO: 0.05 10*3/MM3 (ref 0–0.2)
BASOPHILS NFR BLD AUTO: 0.9 % (ref 0–1.5)
BILIRUB SERPL-MCNC: 0.5 MG/DL (ref 0–1.2)
BUN SERPL-MCNC: 16 MG/DL (ref 8–23)
BUN/CREAT SERPL: 18 (ref 7–25)
CALCIUM SPEC-SCNC: 9.6 MG/DL (ref 8.6–10.5)
CHLORIDE SERPL-SCNC: 102 MMOL/L (ref 98–107)
CHOLEST SERPL-MCNC: 149 MG/DL (ref 0–200)
CO2 SERPL-SCNC: 22.1 MMOL/L (ref 22–29)
CREAT SERPL-MCNC: 0.89 MG/DL (ref 0.76–1.27)
DEPRECATED RDW RBC AUTO: 47.2 FL (ref 37–54)
EOSINOPHIL # BLD AUTO: 0.06 10*3/MM3 (ref 0–0.4)
EOSINOPHIL NFR BLD AUTO: 1.1 % (ref 0.3–6.2)
ERYTHROCYTE [DISTWIDTH] IN BLOOD BY AUTOMATED COUNT: 13.3 % (ref 12.3–15.4)
GFR SERPL CREATININE-BSD FRML MDRD: 82 ML/MIN/1.73
GLOBULIN UR ELPH-MCNC: 2.2 GM/DL
GLUCOSE SERPL-MCNC: 190 MG/DL (ref 65–99)
HBA1C MFR BLD: 8.34 % (ref 4.8–5.6)
HCT VFR BLD AUTO: 45 % (ref 37.5–51)
HDLC SERPL-MCNC: 36 MG/DL (ref 40–60)
HGB BLD-MCNC: 15.4 G/DL (ref 13–17.7)
IMM GRANULOCYTES # BLD AUTO: 0.02 10*3/MM3 (ref 0–0.05)
IMM GRANULOCYTES NFR BLD AUTO: 0.4 % (ref 0–0.5)
LDLC SERPL CALC-MCNC: 70 MG/DL (ref 0–100)
LDLC/HDLC SERPL: 1.67 {RATIO}
LYMPHOCYTES # BLD AUTO: 1.74 10*3/MM3 (ref 0.7–3.1)
LYMPHOCYTES NFR BLD AUTO: 32.4 % (ref 19.6–45.3)
MCH RBC QN AUTO: 33.4 PG (ref 26.6–33)
MCHC RBC AUTO-ENTMCNC: 34.2 G/DL (ref 31.5–35.7)
MCV RBC AUTO: 97.6 FL (ref 79–97)
MONOCYTES # BLD AUTO: 0.54 10*3/MM3 (ref 0.1–0.9)
MONOCYTES NFR BLD AUTO: 10.1 % (ref 5–12)
NEUTROPHILS NFR BLD AUTO: 2.96 10*3/MM3 (ref 1.7–7)
NEUTROPHILS NFR BLD AUTO: 55.1 % (ref 42.7–76)
NRBC BLD AUTO-RTO: 0 /100 WBC (ref 0–0.2)
PLATELET # BLD AUTO: 221 10*3/MM3 (ref 140–450)
PMV BLD AUTO: 9.6 FL (ref 6–12)
POTASSIUM SERPL-SCNC: 4.3 MMOL/L (ref 3.5–5.2)
PROT SERPL-MCNC: 6.2 G/DL (ref 6–8.5)
RBC # BLD AUTO: 4.61 10*6/MM3 (ref 4.14–5.8)
SODIUM SERPL-SCNC: 136 MMOL/L (ref 136–145)
TRIGL SERPL-MCNC: 264 MG/DL (ref 0–150)
VLDLC SERPL-MCNC: 43 MG/DL (ref 5–40)
WBC # BLD AUTO: 5.37 10*3/MM3 (ref 3.4–10.8)

## 2021-09-10 PROCEDURE — 80061 LIPID PANEL: CPT | Performed by: FAMILY MEDICINE

## 2021-09-10 PROCEDURE — 36415 COLL VENOUS BLD VENIPUNCTURE: CPT | Performed by: FAMILY MEDICINE

## 2021-09-10 PROCEDURE — 80053 COMPREHEN METABOLIC PANEL: CPT | Performed by: FAMILY MEDICINE

## 2021-09-10 PROCEDURE — 85025 COMPLETE CBC W/AUTO DIFF WBC: CPT | Performed by: FAMILY MEDICINE

## 2021-09-10 PROCEDURE — 83036 HEMOGLOBIN GLYCOSYLATED A1C: CPT | Performed by: FAMILY MEDICINE

## 2021-09-12 NOTE — PROGRESS NOTES
Please inform the patient of the following abnormal results. Needs to do metformin 1000mg twice a day. His hba1c is worsening.

## 2021-10-20 ENCOUNTER — TELEPHONE (OUTPATIENT)
Dept: INTERNAL MEDICINE | Facility: CLINIC | Age: 81
End: 2021-10-20

## 2021-10-20 NOTE — TELEPHONE ENCOUNTER
----- Message from Augustine Sample sent at 10/19/2021  5:05 PM EDT -----  Regarding: Prescription Question  Contact: [phone]  You wanted me to take 1000 Metformin but it bothers my stomach would like to have you call in  500 mg hope that is possible cannot take 1000 twice a day pls call if you have any questions thanks so much

## 2021-10-21 NOTE — TELEPHONE ENCOUNTER
Have do the 500mg twice a week. I have sent that in to his pharmacy. Have him see me in office in couple weeks.

## 2021-11-03 ENCOUNTER — TELEPHONE (OUTPATIENT)
Dept: INTERNAL MEDICINE | Facility: CLINIC | Age: 81
End: 2021-11-03

## 2021-11-03 NOTE — TELEPHONE ENCOUNTER
Caller: Augustine Chen    Relationship to patient: Self    Best call back number: [phone] (H)    Patient is needing: PATIENT CALLING TO LET DR WESTON KNOW THAT HE WAS SEEN BY  A CARDIOLOGIST DR LINDA CHEN ON 08/06 AND NEXT APPOINTMENT February 22/2022  FOR HIS NEXT MEDICATION REFILL      PATIENT ALSO WANTED TO LET DR WESTON KNOW THAT HIS BLOOD SUGAR IS GOING DOWN WITH metFORMIN (GLUCOPHAGE) 500 MG tablet

## 2021-11-04 DIAGNOSIS — I10 ESSENTIAL HYPERTENSION: ICD-10-CM

## 2021-11-04 RX ORDER — LISINOPRIL 20 MG/1
20 TABLET ORAL DAILY
Qty: 90 TABLET | Refills: 3 | Status: SHIPPED | OUTPATIENT
Start: 2021-11-04 | End: 2022-11-23

## 2021-11-17 RX ORDER — DIGOXIN 125 MCG
125 TABLET ORAL DAILY
Qty: 90 TABLET | Refills: 3 | Status: SHIPPED | OUTPATIENT
Start: 2021-11-17 | End: 2022-02-22 | Stop reason: SDUPTHER

## 2021-12-07 ENCOUNTER — OFFICE VISIT (OUTPATIENT)
Dept: INTERNAL MEDICINE | Facility: CLINIC | Age: 81
End: 2021-12-07

## 2021-12-07 VITALS
SYSTOLIC BLOOD PRESSURE: 130 MMHG | OXYGEN SATURATION: 98 % | HEIGHT: 66 IN | BODY MASS INDEX: 40.55 KG/M2 | DIASTOLIC BLOOD PRESSURE: 76 MMHG | WEIGHT: 252.3 LBS | RESPIRATION RATE: 18 BRPM | HEART RATE: 100 BPM

## 2021-12-07 DIAGNOSIS — E78.2 MIXED HYPERLIPIDEMIA: ICD-10-CM

## 2021-12-07 DIAGNOSIS — E11.59 TYPE 2 DIABETES MELLITUS WITH OTHER CIRCULATORY COMPLICATION, WITHOUT LONG-TERM CURRENT USE OF INSULIN (HCC): Primary | ICD-10-CM

## 2021-12-07 DIAGNOSIS — I10 ESSENTIAL HYPERTENSION: ICD-10-CM

## 2021-12-07 PROCEDURE — 99214 OFFICE O/P EST MOD 30 MIN: CPT | Performed by: FAMILY MEDICINE

## 2021-12-07 RX ORDER — SIMVASTATIN 20 MG
1 TABLET ORAL
COMMUNITY
Start: 2021-11-15 | End: 2021-12-07

## 2021-12-07 RX ORDER — EMPAGLIFLOZIN, METFORMIN HYDROCHLORIDE 25; 1000 MG/1; MG/1
1 TABLET, EXTENDED RELEASE ORAL DAILY
Qty: 30 TABLET | Refills: 6 | Status: SHIPPED | OUTPATIENT
Start: 2021-12-07 | End: 2022-09-14 | Stop reason: SDUPTHER

## 2021-12-07 RX ORDER — DIGOXIN 125 MCG
1 TABLET ORAL DAILY
COMMUNITY
Start: 2021-11-02 | End: 2021-12-07

## 2021-12-07 RX ORDER — EMPAGLIFLOZIN, METFORMIN HYDROCHLORIDE 25; 1000 MG/1; MG/1
1 TABLET, EXTENDED RELEASE ORAL DAILY
Qty: 30 TABLET | Refills: 6 | Status: SHIPPED | OUTPATIENT
Start: 2021-12-07 | End: 2021-12-07 | Stop reason: SDUPTHER

## 2021-12-07 NOTE — PROGRESS NOTES
"Chief Complaint  Diabetes (FOLLOW-UP)    Subjective          Augustine Chen presents to [facility]  History of Present Illness    Patient presents at today's office visit with a past medical history having type 2 diabetes.  Patient notes that he is taking Jardiance 25 mg daily along with Metformin 500 mg twice a day.  He states that with the Metformin he is experiencing some diarrhea.  He also taking glipizide 10 mg twice a day.  He does not have any side effects of these other medicines except the Metformin where he is having diarrhea.    Patient also has a history of essential hypertension.  Blood pressure 130/76.  Patient is currently on lisinopril 20 mg daily, Lasix 40 mg twice a day, and metoprolol succinate  mg twice daily.  He denies any side effects of the medicine.    Patient has a history of hyperlipidemia.  Patient is current taking simvastatin 20 mg daily.  Patient denies any side effects of the medicine.    Objective   Vital Signs:   /76   Pulse 100   Resp 18   Ht 167.6 cm (66\")   Wt 114 kg (252 lb 4.8 oz)   SpO2 98%   BMI 40.72 kg/m²     Physical Exam  Vitals and nursing note reviewed.   Constitutional:       Appearance: He is well-developed.   HENT:      Head: Normocephalic and atraumatic.   Musculoskeletal:      Cervical back: Normal range of motion and neck supple.   Neurological:      Mental Status: He is alert and oriented to person, place, and time.   Psychiatric:         Behavior: Behavior normal.        Result Review :                 Assessment and Plan    Diagnoses and all orders for this visit:    1. Type 2 diabetes mellitus with other circulatory complication, without long-term current use of insulin (HCC) (Primary)  -     Like to switch the Jardiance and the Metformin over to Synjardy extended release  mg daily.  He can still continue taking glipizide.  -     Empagliflozin-metFORMIN HCl ER (Synjardy XR)  MG tablet " sustained-release 24 hour; Take 1 tablet by mouth Daily.  Dispense: 30 tablet; Refill: 6  -     We will check a CMP and a hemoglobin A1c.    2. Essential hypertension        -     Check cmp. Continue current meds.     3. Mixed hyperlipidemia        -     Continue simvastatin.         Follow Up   No follow-ups on file.  Patient was given instructions and counseling regarding his condition or for health maintenance advice. Please see specific information pulled into the AVS if appropriate.

## 2021-12-17 ENCOUNTER — OFFICE VISIT (OUTPATIENT)
Dept: ORTHOPEDIC SURGERY | Facility: CLINIC | Age: 81
End: 2021-12-17

## 2021-12-17 ENCOUNTER — LAB (OUTPATIENT)
Dept: LAB | Facility: HOSPITAL | Age: 81
End: 2021-12-17

## 2021-12-17 ENCOUNTER — TELEPHONE (OUTPATIENT)
Dept: ORTHOPEDIC SURGERY | Facility: CLINIC | Age: 81
End: 2021-12-17

## 2021-12-17 ENCOUNTER — APPOINTMENT (OUTPATIENT)
Dept: GENERAL RADIOLOGY | Facility: HOSPITAL | Age: 81
End: 2021-12-17

## 2021-12-17 VITALS — BODY MASS INDEX: 39.37 KG/M2 | WEIGHT: 245 LBS | TEMPERATURE: 98 F | HEIGHT: 66 IN

## 2021-12-17 DIAGNOSIS — S99.912A INJURY OF LEFT ANKLE, INITIAL ENCOUNTER: Primary | ICD-10-CM

## 2021-12-17 LAB
ALBUMIN SERPL-MCNC: 3.7 G/DL (ref 3.5–5.2)
ALBUMIN/GLOB SERPL: 1.2 G/DL
ALP SERPL-CCNC: 47 U/L (ref 39–117)
ALT SERPL W P-5'-P-CCNC: 29 U/L (ref 1–41)
ANION GAP SERPL CALCULATED.3IONS-SCNC: 16.2 MMOL/L (ref 5–15)
AST SERPL-CCNC: 24 U/L (ref 1–40)
BASOPHILS # BLD AUTO: 0.05 10*3/MM3 (ref 0–0.2)
BASOPHILS NFR BLD AUTO: 0.7 % (ref 0–1.5)
BILIRUB SERPL-MCNC: 0.4 MG/DL (ref 0–1.2)
BUN SERPL-MCNC: 16 MG/DL (ref 8–23)
BUN/CREAT SERPL: 15.7 (ref 7–25)
CALCIUM SPEC-SCNC: 9.5 MG/DL (ref 8.6–10.5)
CHLORIDE SERPL-SCNC: 104 MMOL/L (ref 98–107)
CHOLEST SERPL-MCNC: 126 MG/DL (ref 0–200)
CO2 SERPL-SCNC: 17.8 MMOL/L (ref 22–29)
CREAT SERPL-MCNC: 1.02 MG/DL (ref 0.76–1.27)
DEPRECATED RDW RBC AUTO: 46.2 FL (ref 37–54)
EOSINOPHIL # BLD AUTO: 0.48 10*3/MM3 (ref 0–0.4)
EOSINOPHIL NFR BLD AUTO: 6.5 % (ref 0.3–6.2)
ERYTHROCYTE [DISTWIDTH] IN BLOOD BY AUTOMATED COUNT: 13.1 % (ref 12.3–15.4)
GFR SERPL CREATININE-BSD FRML MDRD: 70 ML/MIN/1.73
GLOBULIN UR ELPH-MCNC: 3 GM/DL
GLUCOSE SERPL-MCNC: 138 MG/DL (ref 65–99)
HBA1C MFR BLD: 7.43 % (ref 4.8–5.6)
HCT VFR BLD AUTO: 45.7 % (ref 37.5–51)
HDLC SERPL-MCNC: 36 MG/DL (ref 40–60)
HGB BLD-MCNC: 15.5 G/DL (ref 13–17.7)
IMM GRANULOCYTES # BLD AUTO: 0.02 10*3/MM3 (ref 0–0.05)
IMM GRANULOCYTES NFR BLD AUTO: 0.3 % (ref 0–0.5)
LDLC SERPL CALC-MCNC: 54 MG/DL (ref 0–100)
LDLC/HDLC SERPL: 1.26 {RATIO}
LYMPHOCYTES # BLD AUTO: 2.04 10*3/MM3 (ref 0.7–3.1)
LYMPHOCYTES NFR BLD AUTO: 27.5 % (ref 19.6–45.3)
MCH RBC QN AUTO: 33.3 PG (ref 26.6–33)
MCHC RBC AUTO-ENTMCNC: 33.9 G/DL (ref 31.5–35.7)
MCV RBC AUTO: 98.3 FL (ref 79–97)
MONOCYTES # BLD AUTO: 0.61 10*3/MM3 (ref 0.1–0.9)
MONOCYTES NFR BLD AUTO: 8.2 % (ref 5–12)
NEUTROPHILS NFR BLD AUTO: 4.21 10*3/MM3 (ref 1.7–7)
NEUTROPHILS NFR BLD AUTO: 56.8 % (ref 42.7–76)
NRBC BLD AUTO-RTO: 0 /100 WBC (ref 0–0.2)
PLATELET # BLD AUTO: 227 10*3/MM3 (ref 140–450)
PMV BLD AUTO: 9.7 FL (ref 6–12)
POTASSIUM SERPL-SCNC: 4.5 MMOL/L (ref 3.5–5.2)
PROT SERPL-MCNC: 6.7 G/DL (ref 6–8.5)
RBC # BLD AUTO: 4.65 10*6/MM3 (ref 4.14–5.8)
SODIUM SERPL-SCNC: 138 MMOL/L (ref 136–145)
TRIGL SERPL-MCNC: 224 MG/DL (ref 0–150)
VLDLC SERPL-MCNC: 36 MG/DL (ref 5–40)
WBC NRBC COR # BLD: 7.41 10*3/MM3 (ref 3.4–10.8)

## 2021-12-17 PROCEDURE — 36415 COLL VENOUS BLD VENIPUNCTURE: CPT | Performed by: FAMILY MEDICINE

## 2021-12-17 PROCEDURE — 73610 X-RAY EXAM OF ANKLE: CPT | Performed by: GENERAL PRACTICE

## 2021-12-17 PROCEDURE — 85025 COMPLETE CBC W/AUTO DIFF WBC: CPT | Performed by: FAMILY MEDICINE

## 2021-12-17 PROCEDURE — 80061 LIPID PANEL: CPT | Performed by: FAMILY MEDICINE

## 2021-12-17 PROCEDURE — 99213 OFFICE O/P EST LOW 20 MIN: CPT | Performed by: NURSE PRACTITIONER

## 2021-12-17 PROCEDURE — 83036 HEMOGLOBIN GLYCOSYLATED A1C: CPT | Performed by: FAMILY MEDICINE

## 2021-12-17 PROCEDURE — 80053 COMPREHEN METABOLIC PANEL: CPT | Performed by: FAMILY MEDICINE

## 2021-12-17 NOTE — TELEPHONE ENCOUNTER
Provider: DHRUV HUESTON    Caller: WIFE GRISELDA     Relationship to Patient: WIFE        Phone Number:  [phone]     Reason for Call: PT'S WIFE CALLING- PT. SAW DHRUV EDWINESTON EARLIER TODAY.   STATES THAT THEY WENT TO [facility] AND THEY DO NOT HAVE ANY RENTAL SCOOTERS AVAILABLE.   SHE CHECKED WITH [facility] AND [facility] ALSO AND THEY DO NOT HAVE ANY.   PT. ASKING IF DHRUV HAS ANY OTHER SUGGESTIONS, OR, IF SHE WOULD ADVISE PT. PURCHASING A KNEE SCOOTER.   PLEASE CALL TO ADVISE.

## 2021-12-17 NOTE — PROGRESS NOTES
History & Physical     Patient: Augustine Chen    YOB: 1940    Medical Record Number: [id number]    Chief Complaints: Left ankle injury    History of Present Illness: 81 y.o. male presents for evaluation of the left ankle.  The injury was sustained when he slipped and fell at [employer] this morning.  He was evaluated with x-rays at [facility].  He was referred to me for treatment recommendations.  Current pain is described as mild, constant, and aching.  Pain is worse with attempted movement or weightbearing.  Denies any alleviating factors.  Patient reports good use and function of the ankle prior to this injury.    Allergies:   Allergies   Allergen Reactions   • Azithromycin Swelling       Home Medications:      Current Outpatient Medications:   •  apixaban (ELIQUIS) 5 MG tablet tablet, Take 5 mg by mouth., Disp: , Rfl:   •  aspirin 81 MG chewable tablet, Chew 81 mg., Disp: , Rfl:   •  digoxin (LANOXIN) 125 MCG tablet, Take 1 tablet by mouth Daily., Disp: 90 tablet, Rfl: 3  •  Empagliflozin-metFORMIN HCl ER (Synjardy XR)  MG tablet sustained-release 24 hour, Take 1 tablet by mouth Daily., Disp: 30 tablet, Rfl: 6  •  furosemide (LASIX) 40 MG tablet, Take 1 tablet by mouth 2 (Two) Times a Day., Disp: 60 tablet, Rfl: 0  •  glipizide (GLUCOTROL) 10 MG tablet, Take 1 tablet by mouth 2 (Two) Times a Day Before Meals., Disp: 180 tablet, Rfl: 3  •  lisinopril (PRINIVIL,ZESTRIL) 20 MG tablet, Take 1 tablet by mouth Daily., Disp: 90 tablet, Rfl: 3  •  metoprolol succinate XL (TOPROL-XL) 100 MG 24 hr tablet, Take 100 mg by mouth 2 (two) times a day., Disp: , Rfl:   •  metoprolol succinate XL (TOPROL-XL) 25 MG 24 hr tablet, Take 1 tablet by mouth Daily., Disp: 30 tablet, Rfl: 11  •  omeprazole OTC (PRILOSEC OTC) 20 MG EC tablet, Take 20 mg by mouth Daily As Needed., Disp: , Rfl:   •  potassium chloride (K-DUR,KLOR-CON) 20 MEQ CR tablet, Take 1 tablet by mouth Daily., Disp: 30 tablet, Rfl: 0  •   simvastatin (ZOCOR) 20 MG tablet, Take 20 mg by mouth Every Evening., Disp: , Rfl: 3    Past Medical History:   Diagnosis Date   • CHF (congestive heart failure) (HCC)    • Diabetes mellitus (HCC)    • Hyperlipidemia    • Hypertension           Past Surgical History:   Procedure Laterality Date   • CARDIAC CATHETERIZATION N/A 10/21/2019    Procedure: Right and Left Heart Cath;  Surgeon: Ashley Walker MD;  Location: [facility] CATH INVASIVE LOCATION;  Service: Cardiovascular   • CARDIAC CATHETERIZATION N/A 10/21/2019    Procedure: Coronary angiography;  Surgeon: Ashley Walker MD;  Location:  [facility] CATH INVASIVE LOCATION;  Service: Cardiovascular   • CARDIAC CATHETERIZATION N/A 10/21/2019    Procedure: Left ventriculography- pressures only- no lv injection;  Surgeon: Ashley Walker MD;  Location: [facility] CATH INVASIVE LOCATION;  Service: Cardiovascular   • CATARACT EXTRACTION WITH INTRAOCULAR LENS IMPLANT            Social History     Occupational History   • Not on file   Tobacco Use   • Smoking status: Never Smoker   • Smokeless tobacco: Never Used   • Tobacco comment: caffeine use - decaf coffee   Substance and Sexual Activity   • Alcohol use: Yes     Alcohol/week: 0.0 standard drinks     Comment: Socially   • Drug use: No   • Sexual activity: Yes     Partners: Female      Social History     Social History Narrative   • Not on file          Family History   Problem Relation Age of Onset   • Heart disease Mother    • Heart attack Mother    • Heart attack Brother        Review of Systems:      Constitutional: Denies fever, shaking or chills   Eyes: Denies change in visual acuity   HEENT: Denies nasal congestion or sore throat   Respiratory: Denies cough or shortness of breath   Cardiovascular: Denies chest pain or edema  Endocrine: Denies tremors, palpitations, intolerance of heat or cold, polyuria, polydipsia.  GI: Denies abdominal pain, nausea, vomiting, bloody stools or diarrhea  : Denies frequency, urgency,  "incontinence, retention, or nocturia.  Musculoskeletal: Denies numbness tingling or loss of motor function except as above  Integument: Denies rash, lesion or ulceration   Neurologic: Denies headache or focal weakness, deficits  Heme: Denies epistaxis, spontaneous or excessive bleeding, epistaxis, hematuria, melena, fatigue, enlarged or tender lymph nodes.      All other pertinent positives and negatives as noted above in HPI.    Physical Exam: 81 y.o. male    Vitals:    12/17/21 1048   Temp: 98 °F (36.7 °C)   Weight: 111 kg (245 lb)   Height: 167.6 cm (66\")       General:  Patient is awake and alert.  Appears in no acute distress or discomfort.    Psych:  Affect and demeanor are appropriate.    Eyes:  Conjunctiva and sclera appear grossly normal.  Eyes track well and EOM seem to be intact.    Dentition:  No gross abnormalities noted.    Ears:  No gross abnormalities.  Hearing adequate for the exam.    Cardiovascular:  Regular rate and rhythm.    Lungs:  Good chest expansion.  Breathing unlabored.    Lymph:  No palpable masses or adenopathy in the affected extremity    Left lower extremity:  Ace wrap was in place and removed for the exam.  There is diffuse edema laterally. Skin appears benign.  No lacerations or abrasions.  Focal tenderness noted over the lateral aspect of the ankle.  There are no palpable step-offs.  No tenderness along the medial side of the ankle, upper leg or knee.  No palpable masses or adenopathy.  Compartments soft in the calf and foot.  Painful, limited ROM of the ankle.  Could not assess stability due to discomfort with motion.  Good strength in the toes with plantar flexion and dorsiflexion albeit with discomfort.  Intact sensation.  Brisk cap refill.  Palpable dorsalis pedis pulse.  Toes are pink and warm.    Diagnostic Tests:  Lab Results   Component Value Date    GLUCOSE 190 (H) 09/10/2021    CALCIUM 9.6 09/10/2021     09/10/2021    K 4.3 09/10/2021    CO2 22.1 09/10/2021    CL " 102 09/10/2021    BUN 16 09/10/2021    CREATININE 0.89 09/10/2021    EGFRIFNONA 82 09/10/2021    BCR 18.0 09/10/2021    ANIONGAP 11.9 09/10/2021     Lab Results   Component Value Date    WBC 7.41 12/17/2021    HGB 15.5 12/17/2021    HCT 45.7 12/17/2021    MCV 98.3 (H) 12/17/2021     12/17/2021     Lab Results   Component Value Date    INR 1.25 (H) 10/18/2019    PROTIME 15.4 (H) 10/18/2019       Imaging:  Outside AP, mortise and lateral views of the left ankle are reviewed along with the associated report.  There is a nondisplaced distal left fibula fracture.  The mortise is symmetric.  Medial clear space is normal.  He has vascular calcification in the posterior vessels noted on the lateral view.      Assessment:  Left ankle injury, nondisplaced distal left fibula fracture    Plan:  We had a thorough discussion regarding the patient's options and the risks of non-surgical management versus surgery.  This appears to be a stable injury which should be amenable to conservative treatment.  The patient was instructed on appropriate activity modifications and restrictions including nonweightbearing on the affected extremity.   I had him fitted for a tall boot today to protect the fracture.  I have arranged for him to get a knee scooter to help with mobilization.  I offered to provide him with a prescription for pain medication, but he declined.  He may take OTC Tylenol, but was instructed to avoid anti-inflammatory medications as he is taking Eliquis.  He will follow up with Dr. Mckeon in 10 days for repeat x-rays.     Date: 12/17/2021    AYDEE Morales

## 2021-12-17 NOTE — TELEPHONE ENCOUNTER
I spoke to Ms. Chen.  I explained that our [employer] rep has checked the [place] and [place] locations for the knee scooter, but they do not have any knee scooters available there either.  I recommended she call around to some drug stores or check online.  She said their daughter-in-law is helping find one.  She appreciated the return call.

## 2021-12-22 NOTE — PROGRESS NOTES
Please inform the patient of the following abnormal results. Labs are stable, continue current medications.

## 2021-12-23 ENCOUNTER — TELEPHONE (OUTPATIENT)
Dept: ORTHOPEDIC SURGERY | Facility: CLINIC | Age: 81
End: 2021-12-23

## 2021-12-23 DIAGNOSIS — S99.912A INJURY OF LEFT ANKLE, INITIAL ENCOUNTER: Primary | ICD-10-CM

## 2021-12-23 DIAGNOSIS — S99.912A INJURY OF LEFT ANKLE, INITIAL ENCOUNTER: ICD-10-CM

## 2021-12-23 RX ORDER — HYDROCODONE BITARTRATE AND ACETAMINOPHEN 5; 325 MG/1; MG/1
TABLET ORAL
Qty: 40 TABLET | Refills: 0 | Status: SHIPPED | OUTPATIENT
Start: 2021-12-23 | End: 2021-12-23 | Stop reason: SDUPTHER

## 2021-12-23 RX ORDER — HYDROCODONE BITARTRATE AND ACETAMINOPHEN 5; 325 MG/1; MG/1
TABLET ORAL
Qty: 40 TABLET | Refills: 0 | Status: SHIPPED | OUTPATIENT
Start: 2021-12-23 | End: 2021-12-28 | Stop reason: SDUPTHER

## 2021-12-23 NOTE — TELEPHONE ENCOUNTER
"HUB STAFF ATTEMPTED CLINICAL WARM TRANSFER - NO ANSWER     Caller: WIFE Paul SWAIN SAMPLE    Relationship: Emergency Contact / WIFE     Best call back number: [phone]     Requested Prescriptions:   \"SOME STRONGER PAIN PILLS\"     Pharmacy where request should be sent:   [id number] PH: [phone]     Additional details provided by patient: PLEASE SEE MY CHART MESSAGE FROM KAYLENE SWAIN THIS MORNING 12/23/21 @ 0524     \"When my  saw you for his ankle on the 18th is there a way you can prescribe him some stronger pain pills he thought he could get away with the Tylenol but not working too good the ankle hurts all the time please let us know he uses the [facility] in [place]  phone [phone] thanks so much\"    Does the patient have less than a 3 day supply:  [x] Yes  [] No - DOES NOT HAVE ANY PAIN MEDICATION YET - HAS BEEN TAKING OTC TYLENOL 500 MG (6 TABLETS SPACED OUT THROUGHOUT THE DAY)     PATIENT ALSO TAKES BLOOD THINNER ELIQUIS 5 MG (2 TABLETS PER DAY)     PATIENT HAS FOLLOW UP w/DR VELAZCO THIS MON 12/27/21 @ 1020 FOR IHC 10 DAY LT ANKLE FX 12/17/21 - AND PATIENT ASKING IF HE CAN TAKE THE LEFT ANKLE WALKING BOOT OFF?      KAYLENE SWAIN Best call back number: [phone] (PLEASE CALL / LEAVE VMAIL TO DISCUSS &or NOTIFY IF / WHEN SCRIPT SENT IN)     THANKS  "

## 2021-12-23 NOTE — TELEPHONE ENCOUNTER
Let pt know his medication had been reordered and he can check with his pharmacy. Pt voiced understanding

## 2021-12-23 NOTE — TELEPHONE ENCOUNTER
I spoke with patient he has been treated nonoperatively for an ankle fracture by Dr. Mckeon and saw him toward the end of last week and thought he could just use Tylenol but is starting quite a bit of discomfort and requested something stronger.  Reviewed him we can try some hydrocodone 1-2 of the 5 mg every 6 hours as needed and counseled him about the addictive nature and careful use of narcotics.  He appreciated the call and will follow up with Dr. Mckeon as scheduled next week.

## 2021-12-27 ENCOUNTER — OFFICE VISIT (OUTPATIENT)
Dept: ORTHOPEDIC SURGERY | Facility: CLINIC | Age: 81
End: 2021-12-27

## 2021-12-27 VITALS — TEMPERATURE: 98.2 F | WEIGHT: 245 LBS | HEIGHT: 66 IN | BODY MASS INDEX: 39.37 KG/M2

## 2021-12-27 DIAGNOSIS — S99.912A INJURY OF LEFT ANKLE, INITIAL ENCOUNTER: Primary | ICD-10-CM

## 2021-12-27 DIAGNOSIS — M25.572 LEFT ANKLE PAIN, UNSPECIFIED CHRONICITY: ICD-10-CM

## 2021-12-27 PROCEDURE — 99213 OFFICE O/P EST LOW 20 MIN: CPT | Performed by: ORTHOPAEDIC SURGERY

## 2021-12-27 PROCEDURE — 73610 X-RAY EXAM OF ANKLE: CPT | Performed by: ORTHOPAEDIC SURGERY

## 2021-12-27 NOTE — PROGRESS NOTES
Augustine Chen : 1940 MRN: [id number] DATE: 2021    CC:  Left ankle fracture    HPI: Mr. Chen was seen as a consult for left ankle fracture. He was placed in a boot. He has been compliant with the boot. He says he has tried to be compliant with the weightbearing restrictions but it has been difficult. He has a knee scooter that he is using but he admits that he occasionally puts the foot down for balance and such. Localizes pain to the lateral aspect of the ankle. He takes half a hydrocodone tab occasionally for the discomfort. Denies any other complaints or issues.    Vitals:    21 1012   Temp: 98.2 °F (36.8 °C)       Left ankle:  Boot in place and subsequently removed. His skin is benign. He has ecchymosis and edema over the lateral aspect of his ankle. He is tender over the lateral malleolus. No medial or posterior tenderness. Ankle motion is limited but well-tolerated. His calf is soft.  Negative Tash's sign. Intact motor and sensory function distally in foot.  Good cap refill.      Imaging:  3 view x-rays of left ankle including AP, lateral and mortise views are ordered and reviewed by me to evaluate alignment and for comparison purposes. These are compared to previous x-rays. He has a minimally displaced Norris B type lateral malleolar fracture. Mortise is symmetric.  Alignment is well maintained relative to previous films.  No concerning findings.    Impression:  2 weeks s/p closed treatment left ankle fracture    Plan:    I recommend he continue use of the boot. I recommend continued nonweightbearing. I will have him follow-up in 1 month for repeat x-rays and reevaluation. I anticipate progressing his weightbearing status on the ankle at that time.    Demond Mckeon MD

## 2021-12-28 DIAGNOSIS — S99.912A INJURY OF LEFT ANKLE, INITIAL ENCOUNTER: ICD-10-CM

## 2021-12-28 RX ORDER — HYDROCODONE BITARTRATE AND ACETAMINOPHEN 5; 325 MG/1; MG/1
TABLET ORAL
Qty: 40 TABLET | Refills: 0 | Status: SHIPPED | OUTPATIENT
Start: 2021-12-28 | End: 2022-10-26 | Stop reason: ALTCHOICE

## 2022-01-31 ENCOUNTER — OFFICE VISIT (OUTPATIENT)
Dept: ORTHOPEDIC SURGERY | Facility: CLINIC | Age: 82
End: 2022-01-31

## 2022-01-31 VITALS — WEIGHT: 245.1 LBS | TEMPERATURE: 96.6 F | BODY MASS INDEX: 39.39 KG/M2 | HEIGHT: 66 IN

## 2022-01-31 DIAGNOSIS — Z09 FRACTURE FOLLOW-UP: ICD-10-CM

## 2022-01-31 DIAGNOSIS — M25.572 LEFT ANKLE PAIN, UNSPECIFIED CHRONICITY: Primary | ICD-10-CM

## 2022-01-31 PROCEDURE — 73610 X-RAY EXAM OF ANKLE: CPT | Performed by: ORTHOPAEDIC SURGERY

## 2022-01-31 PROCEDURE — 99024 POSTOP FOLLOW-UP VISIT: CPT | Performed by: ORTHOPAEDIC SURGERY

## 2022-01-31 NOTE — PROGRESS NOTES
"Augustine Sample : 1940 MRN: [id number] DATE: 2022    CC:  6 weeks s/p closed treatment of left ankle fracture    HPI: Patient returns to clinic today stating pain is improved.  He has not been compliant with the weight bearing restrictions and he tells me he has been walking normally.  He has at least been wearing the boot.      Vitals:    22 0801   Temp: 96.6 °F (35.9 °C)   TempSrc: Temporal   Weight: 111 kg (245 lb 1.6 oz)   Height: 167.6 cm (66\")     Exam:  Boot in place and removed.  Skin is benign.  Calf soft.  Negative Tash's sign.  Mild tenderness over lateral malleolus.  Good motor and sensory function distally in foot.  Palpable pulses with good cap refill.      Imaging: 3 view x-rays of left ankle including AP, lateral and mortise views are ordered and reviewed by me to evaluate alignment and for comparison purposes. Alignment is well maintained.  No concerning findings.    Impression:  6 weeks s/p closed treatment of left ankle fracture    Plan: Despite his noncompliance, I do not see any significant change in alignment and everything appears to be healing, both clinically and radiographically.  He can continue to bear weight in the boot.  I did  him about the importance of compliance going forward to hopefully optimize his chances of a good outcome.  I counseled him to avoid any prolonged standing or walking.  I recommend follow up in 6 weeks for repeat x-rays.     Demond Mckeon MD      2022   "

## 2022-02-02 ENCOUNTER — OFFICE VISIT (OUTPATIENT)
Dept: INTERNAL MEDICINE | Facility: CLINIC | Age: 82
End: 2022-02-02

## 2022-02-02 VITALS
HEIGHT: 66 IN | RESPIRATION RATE: 16 BRPM | SYSTOLIC BLOOD PRESSURE: 120 MMHG | OXYGEN SATURATION: 96 % | WEIGHT: 245 LBS | TEMPERATURE: 98 F | DIASTOLIC BLOOD PRESSURE: 50 MMHG | BODY MASS INDEX: 39.37 KG/M2 | HEART RATE: 92 BPM

## 2022-02-02 DIAGNOSIS — Z00.00 MEDICARE ANNUAL WELLNESS VISIT, SUBSEQUENT: ICD-10-CM

## 2022-02-02 DIAGNOSIS — Z00.00 HEALTHCARE MAINTENANCE: ICD-10-CM

## 2022-02-02 DIAGNOSIS — Z00.00 VISIT FOR WELL MAN HEALTH CHECK: Primary | ICD-10-CM

## 2022-02-02 PROCEDURE — 1126F AMNT PAIN NOTED NONE PRSNT: CPT | Performed by: FAMILY MEDICINE

## 2022-02-02 PROCEDURE — 1170F FXNL STATUS ASSESSED: CPT | Performed by: FAMILY MEDICINE

## 2022-02-02 PROCEDURE — 99397 PER PM REEVAL EST PAT 65+ YR: CPT | Performed by: FAMILY MEDICINE

## 2022-02-02 PROCEDURE — 1160F RVW MEDS BY RX/DR IN RCRD: CPT | Performed by: FAMILY MEDICINE

## 2022-02-02 PROCEDURE — G0439 PPPS, SUBSEQ VISIT: HCPCS | Performed by: FAMILY MEDICINE

## 2022-02-02 NOTE — PROGRESS NOTES
Moise Chen is a 81 y.o. male and is here for a comprehensive physical exam. The patient reports no problems.            Social History:   Social History     Socioeconomic History   • Marital status:    Tobacco Use   • Smoking status: Never Smoker   • Smokeless tobacco: Never Used   • Tobacco comment: caffeine use - decaf coffee   Vaping Use   • Vaping Use: Never used   Substance and Sexual Activity   • Alcohol use: Defer     Alcohol/week: 0.0 standard drinks     Comment: Socially   • Drug use: No   • Sexual activity: Yes     Partners: Female       Family History:   Family History   Problem Relation Age of Onset   • Heart disease Mother    • Heart attack Mother    • Heart attack Brother        Past Medical History:   Past Medical History:   Diagnosis Date   • CHF (congestive heart failure) (HCC)    • Diabetes mellitus (HCC)    • Hyperlipidemia    • Hypertension        The following portions of the patient's history were reviewed and updated as appropriate: allergies, current medications, past family history, past medical history, past social history, past surgical history and problem list.    Review of Systems    Review of Systems   Constitutional: Negative for chills and fever.   HENT: Negative for congestion, rhinorrhea, sinus pain and sore throat.    Eyes: Negative for photophobia and visual disturbance.   Respiratory: Negative for cough, chest tightness and shortness of breath.    Cardiovascular: Negative for chest pain and palpitations.   Gastrointestinal: Negative for diarrhea, nausea and vomiting.   Genitourinary: Negative for dysuria, frequency and urgency.   Skin: Negative for rash and wound.   Neurological: Negative for dizziness and syncope.   Psychiatric/Behavioral: Negative for behavioral problems and confusion.       Objective   Physical Exam  Vitals and nursing note reviewed.   Constitutional:       Appearance: He is well-developed.   HENT:      Head: Normocephalic and atraumatic.       Right Ear: External ear normal.      Left Ear: External ear normal.   Cardiovascular:      Rate and Rhythm: Normal rate and regular rhythm.      Heart sounds: Normal heart sounds.   Pulmonary:      Effort: Pulmonary effort is normal. No respiratory distress.      Breath sounds: Normal breath sounds.   Abdominal:      Palpations: Abdomen is soft.      Tenderness: There is no abdominal tenderness. There is no guarding.   Musculoskeletal:         General: Normal range of motion.      Cervical back: Normal range of motion and neck supple.   Lymphadenopathy:      Cervical: No cervical adenopathy.   Skin:     General: Skin is warm.   Neurological:      Mental Status: He is alert and oriented to person, place, and time.   Psychiatric:         Behavior: Behavior normal.         Vitals:    02/02/22 0935   BP: 120/50   Pulse: 92   Resp: 16   Temp: 98 °F (36.7 °C)   SpO2: 96%     Body mass index is 39.54 kg/m².    Medications:   Current Outpatient Medications:   •  apixaban (ELIQUIS) 5 MG tablet tablet, Take 5 mg by mouth., Disp: , Rfl:   •  aspirin 81 MG chewable tablet, Chew 81 mg., Disp: , Rfl:   •  digoxin (LANOXIN) 125 MCG tablet, Take 1 tablet by mouth Daily., Disp: 90 tablet, Rfl: 3  •  Empagliflozin-metFORMIN HCl ER (Synjardy XR)  MG tablet sustained-release 24 hour, Take 1 tablet by mouth Daily., Disp: 30 tablet, Rfl: 6  •  furosemide (LASIX) 40 MG tablet, Take 1 tablet by mouth 2 (Two) Times a Day., Disp: 60 tablet, Rfl: 0  •  glipizide (GLUCOTROL) 10 MG tablet, Take 1 tablet by mouth 2 (Two) Times a Day Before Meals., Disp: 180 tablet, Rfl: 3  •  HYDROcodone-acetaminophen (NORCO) 5-325 MG per tablet, 1-2 oral Q6H PRN moderate to severe pain, Disp: 40 tablet, Rfl: 0  •  lisinopril (PRINIVIL,ZESTRIL) 20 MG tablet, Take 1 tablet by mouth Daily., Disp: 90 tablet, Rfl: 3  •  metoprolol succinate XL (TOPROL-XL) 25 MG 24 hr tablet, Take 1 tablet by mouth Daily., Disp: 30 tablet, Rfl: 11  •  omeprazole OTC  (PRILOSEC OTC) 20 MG EC tablet, Take 20 mg by mouth Daily As Needed., Disp: , Rfl:   •  potassium chloride (K-DUR,KLOR-CON) 20 MEQ CR tablet, Take 1 tablet by mouth Daily., Disp: 30 tablet, Rfl: 0  •  simvastatin (ZOCOR) 20 MG tablet, Take 20 mg by mouth Every Evening., Disp: , Rfl: 3  •  metoprolol succinate XL (TOPROL-XL) 100 MG 24 hr tablet, Take 100 mg by mouth 2 (two) times a day., Disp: , Rfl:        Assessment/Plan   Healthy male exam.      1. Healthcare Maintenance:  2. Patient Counseling:  --Nutrition: Stressed importance of moderation in sodium/caffeine intake, saturated fat and cholesterol, caloric balance, sufficient intake of fresh fruits, vegetables, fiber, calcium and vit D  --Exercise: Recommended 30 minutes of exercise daily.   --Immunizations reviewed.  --Discussed benefits of screening colonoscopy.    Diagnoses and all orders for this visit:    Visit for well [place] health check    Healthcare maintenance  -     CBC & Differential  -     Comprehensive Metabolic Panel  -     Hemoglobin A1c  -     Thyroid Panel With TSH  -     Lipid Panel With LDL / HDL Ratio  -     Microalbumin / Creatinine Urine Ratio - Urine, Clean Catch    Medicare annual wellness visit, subsequent        No follow-ups on file.           Dictated utilizing Dragon Voice Recognition Software

## 2022-02-02 NOTE — PROGRESS NOTES
The ABCs of the Annual Wellness Visit  Subsequent Medicare Wellness Visit    Chief Complaint   Patient presents with   • Medicare Wellness-subsequent      Subjective    History of Present Illness:  Augustine Chen is a 81 y.o. male who presents for a Subsequent Medicare Wellness Visit.    The following portions of the patient's history were reviewed and   updated as appropriate: allergies, current medications, past family history, past medical history, past social history, past surgical history and problem list.    Compared to one year ago, the patient feels his physical   health is better.    Compared to one year ago, the patient feels his mental   health is better.    Recent Hospitalizations:  He was not admitted to the hospital during the last year.       Current Medical Providers:  Patient Care Team:  Thony Mclean MD as PCP - General (Family Medicine)    Outpatient Medications Prior to Visit   Medication Sig Dispense Refill   • apixaban (ELIQUIS) 5 MG tablet tablet Take 5 mg by mouth.     • aspirin 81 MG chewable tablet Chew 81 mg.     • digoxin (LANOXIN) 125 MCG tablet Take 1 tablet by mouth Daily. 90 tablet 3   • Empagliflozin-metFORMIN HCl ER (Synjardy XR)  MG tablet sustained-release 24 hour Take 1 tablet by mouth Daily. 30 tablet 6   • furosemide (LASIX) 40 MG tablet Take 1 tablet by mouth 2 (Two) Times a Day. 60 tablet 0   • glipizide (GLUCOTROL) 10 MG tablet Take 1 tablet by mouth 2 (Two) Times a Day Before Meals. 180 tablet 3   • HYDROcodone-acetaminophen (NORCO) 5-325 MG per tablet 1-2 oral Q6H PRN moderate to severe pain 40 tablet 0   • lisinopril (PRINIVIL,ZESTRIL) 20 MG tablet Take 1 tablet by mouth Daily. 90 tablet 3   • metoprolol succinate XL (TOPROL-XL) 25 MG 24 hr tablet Take 1 tablet by mouth Daily. 30 tablet 11   • omeprazole OTC (PRILOSEC OTC) 20 MG EC tablet Take 20 mg by mouth Daily As Needed.     • potassium chloride (K-DUR,KLOR-CON) 20 MEQ CR tablet Take 1 tablet by mouth Daily.  "30 tablet 0   • simvastatin (ZOCOR) 20 MG tablet Take 20 mg by mouth Every Evening.  3   • metoprolol succinate XL (TOPROL-XL) 100 MG 24 hr tablet Take 100 mg by mouth 2 (two) times a day.       No facility-administered medications prior to visit.       Opioid medication/s are on active medication list.  and I have evaluated his active treatment plan and pain score trends (see table).  Vitals:    02/02/22 0935   PainSc: 0-No pain     I have reviewed the chart for potential of high risk medication and harmful drug interactions in the elderly.            Aspirin is on active medication list. Aspirin use is indicated based on review of current medical condition/s. Pros and cons of this therapy have been discussed today. Benefits of this medication outweigh potential harm.  Patient has been encouraged to continue taking this medication.  .      Patient Active Problem List   Diagnosis   • Acute combined systolic and diastolic congestive heart failure (HCC)   • Prediabetes   • NSVT (nonsustained ventricular tachycardia) (HCC)   • HTN (hypertension)   • Hypercholesterolemia   • Type 2 diabetes mellitus with circulatory disorder (HCC)   • Pulmonary hypertension (HCC)   • NICM (nonischemic cardiomyopathy) (HCC)   • Atrial fibrillation, persistent (HCC)   • Chronic anticoagulation   • Severe obesity (BMI 35.0-39.9) with comorbidity (HCC)     Advance Care Planning  Advance Directive is not on file.  ACP discussion was held with the patient during this visit. Patient has an advance directive (not in EMR), copy requested.          Objective    Vitals:    02/02/22 0935   BP: 120/50   Pulse: 92   Resp: 16   Temp: 98 °F (36.7 °C)   SpO2: 96%   Weight: 111 kg (245 lb)   Height: 167.6 cm (66\")   PainSc: 0-No pain     BMI Readings from Last 1 Encounters:   02/02/22 39.54 kg/m²   BMI is above normal parameters. Recommendations include: exercise counseling and nutrition counseling    Does the patient have evidence of cognitive " impairment? No    Physical Exam  Lab Results   Component Value Date    TRIG 224 (H) 12/17/2021    HDL 36 (L) 12/17/2021    LDL 54 12/17/2021    VLDL 36 12/17/2021    HGBA1C 7.43 (H) 12/17/2021            HEALTH RISK ASSESSMENT    Smoking Status:  Social History     Tobacco Use   Smoking Status Never Smoker   Smokeless Tobacco Never Used   Tobacco Comment    caffeine use - decaf coffee     Alcohol Consumption:  Social History     Substance and Sexual Activity   Alcohol Use Defer   • Alcohol/week: 0.0 standard drinks    Comment: Socially     Fall Risk Screen:    STEADI Fall Risk Assessment was completed, and patient is at LOW risk for falls.Assessment completed on:2/2/2022    Depression Screening:  PHQ-2/PHQ-9 Depression Screening 2/2/2022   Little interest or pleasure in doing things 0   Feeling down, depressed, or hopeless 0   Total Score 0       Health Habits and Functional and Cognitive Screening:  Functional & Cognitive Status 2/2/2022   Do you have difficulty preparing food and eating? No   Do you have difficulty bathing yourself, getting dressed or grooming yourself? No   Do you have difficulty using the toilet? No   Do you have difficulty moving around from place to place? No   Do you have trouble with steps or getting out of a bed or a chair? No   Current Diet Well Balanced Diet   Dental Exam Up to date   Eye Exam Up to date   Exercise (times per week) 7 times per week   Current Exercises Include Walking   Do you need help using the phone?  No   Are you deaf or do you have serious difficulty hearing?  Yes   Do you need help with transportation? No   Do you need help shopping? No   Do you need help preparing meals?  No   Do you need help with housework?  No   Do you need help with laundry? No   Do you need help taking your medications? No   Do you need help managing money? No   Do you ever drive or ride in a car without wearing a seat belt? No   Have you felt unusual stress, anger or loneliness in the last  month? No   Who do you live with? Spouse   If you need help, do you have trouble finding someone available to you? No   Have you been bothered in the last four weeks by sexual problems? No   Do you have difficulty concentrating, remembering or making decisions? No       Age-appropriate Screening Schedule:  Refer to the list below for future screening recommendations based on patient's age, sex and/or medical conditions. Orders for these recommended tests are listed in the plan section. The patient has been provided with a written plan.    Health Maintenance   Topic Date Due   • ZOSTER VACCINE (1 of 2) Never done   • DIABETIC EYE EXAM  Never done   • URINE MICROALBUMIN  12/07/2021   • HEMOGLOBIN A1C  06/17/2022   • LIPID PANEL  12/17/2022   • TDAP/TD VACCINES (3 - Td or Tdap) 06/01/2027   • INFLUENZA VACCINE  Completed              Assessment/Plan   CMS Preventative Services Quick Reference  Risk Factors Identified During Encounter  Cardiovascular Disease  The above risks/problems have been discussed with the patient.  Follow up actions/plans if indicated are seen below in the Assessment/Plan Section.  Pertinent information has been shared with the patient in the After Visit Summary.    Diagnoses and all orders for this visit:    1. Visit for well man health check (Primary)    2. Healthcare maintenance  -     CBC & Differential  -     Comprehensive Metabolic Panel  -     Hemoglobin A1c  -     Thyroid Panel With TSH  -     Lipid Panel With LDL / HDL Ratio  -     Microalbumin / Creatinine Urine Ratio - Urine, Clean Catch    3. Medicare annual wellness visit, subsequent        Follow Up:   No follow-ups on file.     An After Visit Summary and PPPS were made available to the patient.

## 2022-02-17 ENCOUNTER — LAB (OUTPATIENT)
Dept: LAB | Facility: HOSPITAL | Age: 82
End: 2022-02-17

## 2022-02-17 DIAGNOSIS — E87.1 LOW SODIUM LEVELS: Primary | ICD-10-CM

## 2022-02-17 LAB
ALBUMIN SERPL-MCNC: 3.8 G/DL (ref 3.5–5.2)
ALBUMIN UR-MCNC: <1.2 MG/DL
ALBUMIN/GLOB SERPL: 1.5 G/DL
ALP SERPL-CCNC: 42 U/L (ref 39–117)
ALT SERPL W P-5'-P-CCNC: 24 U/L (ref 1–41)
ANION GAP SERPL CALCULATED.3IONS-SCNC: 10.5 MMOL/L (ref 5–15)
AST SERPL-CCNC: 18 U/L (ref 1–40)
BASOPHILS # BLD AUTO: 0.05 10*3/MM3 (ref 0–0.2)
BASOPHILS NFR BLD AUTO: 0.7 % (ref 0–1.5)
BILIRUB SERPL-MCNC: 0.5 MG/DL (ref 0–1.2)
BUN SERPL-MCNC: 15 MG/DL (ref 8–23)
BUN/CREAT SERPL: 16.9 (ref 7–25)
CALCIUM SPEC-SCNC: 9.7 MG/DL (ref 8.6–10.5)
CHLORIDE SERPL-SCNC: 94 MMOL/L (ref 98–107)
CHOLEST SERPL-MCNC: 134 MG/DL (ref 0–200)
CO2 SERPL-SCNC: 27.5 MMOL/L (ref 22–29)
CREAT SERPL-MCNC: 0.89 MG/DL (ref 0.76–1.27)
CREAT UR-MCNC: 106.7 MG/DL
DEPRECATED RDW RBC AUTO: 47.5 FL (ref 37–54)
EOSINOPHIL # BLD AUTO: 0.25 10*3/MM3 (ref 0–0.4)
EOSINOPHIL NFR BLD AUTO: 3.6 % (ref 0.3–6.2)
ERYTHROCYTE [DISTWIDTH] IN BLOOD BY AUTOMATED COUNT: 13.2 % (ref 12.3–15.4)
GFR SERPL CREATININE-BSD FRML MDRD: 82 ML/MIN/1.73
GLOBULIN UR ELPH-MCNC: 2.5 GM/DL
GLUCOSE SERPL-MCNC: 144 MG/DL (ref 65–99)
HBA1C MFR BLD: 6.8 % (ref 4.8–5.6)
HCT VFR BLD AUTO: 46.4 % (ref 37.5–51)
HDLC SERPL-MCNC: 36 MG/DL (ref 40–60)
HGB BLD-MCNC: 15.3 G/DL (ref 13–17.7)
IMM GRANULOCYTES # BLD AUTO: 0.01 10*3/MM3 (ref 0–0.05)
IMM GRANULOCYTES NFR BLD AUTO: 0.1 % (ref 0–0.5)
LDLC SERPL CALC-MCNC: 62 MG/DL (ref 0–100)
LDLC/HDLC SERPL: 1.5 {RATIO}
LYMPHOCYTES # BLD AUTO: 1.85 10*3/MM3 (ref 0.7–3.1)
LYMPHOCYTES NFR BLD AUTO: 26.4 % (ref 19.6–45.3)
MCH RBC QN AUTO: 32.4 PG (ref 26.6–33)
MCHC RBC AUTO-ENTMCNC: 33 G/DL (ref 31.5–35.7)
MCV RBC AUTO: 98.3 FL (ref 79–97)
MICROALBUMIN/CREAT UR: NORMAL MG/G{CREAT}
MONOCYTES # BLD AUTO: 0.73 10*3/MM3 (ref 0.1–0.9)
MONOCYTES NFR BLD AUTO: 10.4 % (ref 5–12)
NEUTROPHILS NFR BLD AUTO: 4.13 10*3/MM3 (ref 1.7–7)
NEUTROPHILS NFR BLD AUTO: 58.8 % (ref 42.7–76)
NRBC BLD AUTO-RTO: 0 /100 WBC (ref 0–0.2)
PLATELET # BLD AUTO: 242 10*3/MM3 (ref 140–450)
PMV BLD AUTO: 9.7 FL (ref 6–12)
POTASSIUM SERPL-SCNC: 4.3 MMOL/L (ref 3.5–5.2)
PROT SERPL-MCNC: 6.3 G/DL (ref 6–8.5)
RBC # BLD AUTO: 4.72 10*6/MM3 (ref 4.14–5.8)
SODIUM SERPL-SCNC: 132 MMOL/L (ref 136–145)
T-UPTAKE NFR SERPL: 0.99 TBI (ref 0.8–1.3)
T4 SERPL-MCNC: 5.28 MCG/DL (ref 4.5–11.7)
TRIGL SERPL-MCNC: 220 MG/DL (ref 0–150)
TSH SERPL DL<=0.05 MIU/L-ACNC: 3.13 UIU/ML (ref 0.27–4.2)
VLDLC SERPL-MCNC: 36 MG/DL (ref 5–40)
WBC NRBC COR # BLD: 7.02 10*3/MM3 (ref 3.4–10.8)

## 2022-02-17 PROCEDURE — 80053 COMPREHEN METABOLIC PANEL: CPT | Performed by: FAMILY MEDICINE

## 2022-02-17 PROCEDURE — 84443 ASSAY THYROID STIM HORMONE: CPT | Performed by: FAMILY MEDICINE

## 2022-02-17 PROCEDURE — 82570 ASSAY OF URINE CREATININE: CPT | Performed by: FAMILY MEDICINE

## 2022-02-17 PROCEDURE — 82043 UR ALBUMIN QUANTITATIVE: CPT | Performed by: FAMILY MEDICINE

## 2022-02-17 PROCEDURE — 83036 HEMOGLOBIN GLYCOSYLATED A1C: CPT | Performed by: FAMILY MEDICINE

## 2022-02-17 PROCEDURE — 85025 COMPLETE CBC W/AUTO DIFF WBC: CPT | Performed by: FAMILY MEDICINE

## 2022-02-17 PROCEDURE — 84479 ASSAY OF THYROID (T3 OR T4): CPT | Performed by: FAMILY MEDICINE

## 2022-02-17 PROCEDURE — 36415 COLL VENOUS BLD VENIPUNCTURE: CPT | Performed by: FAMILY MEDICINE

## 2022-02-17 PROCEDURE — 84436 ASSAY OF TOTAL THYROXINE: CPT | Performed by: FAMILY MEDICINE

## 2022-02-17 PROCEDURE — 80061 LIPID PANEL: CPT | Performed by: FAMILY MEDICINE

## 2022-02-17 NOTE — PROGRESS NOTES
Please inform the patient of the following abnormal results. Sodium is slightly low. Would like to check a bmp in 1 wk.

## 2022-02-17 NOTE — PROGRESS NOTES
Please inform the patient of the following abnormal results. Hba1c improving, continue current medications.

## 2022-02-22 ENCOUNTER — OFFICE VISIT (OUTPATIENT)
Dept: CARDIOLOGY | Facility: CLINIC | Age: 82
End: 2022-02-22

## 2022-02-22 ENCOUNTER — LAB (OUTPATIENT)
Dept: LAB | Facility: HOSPITAL | Age: 82
End: 2022-02-22

## 2022-02-22 VITALS
HEIGHT: 66 IN | OXYGEN SATURATION: 96 % | WEIGHT: 248.8 LBS | HEART RATE: 114 BPM | DIASTOLIC BLOOD PRESSURE: 76 MMHG | BODY MASS INDEX: 39.98 KG/M2 | SYSTOLIC BLOOD PRESSURE: 140 MMHG

## 2022-02-22 DIAGNOSIS — I48.19 ATRIAL FIBRILLATION, PERSISTENT: ICD-10-CM

## 2022-02-22 DIAGNOSIS — E87.1 LOW SODIUM LEVELS: ICD-10-CM

## 2022-02-22 DIAGNOSIS — G47.33 OSA (OBSTRUCTIVE SLEEP APNEA): ICD-10-CM

## 2022-02-22 DIAGNOSIS — I10 PRIMARY HYPERTENSION: ICD-10-CM

## 2022-02-22 DIAGNOSIS — I27.20 PULMONARY HYPERTENSION: ICD-10-CM

## 2022-02-22 DIAGNOSIS — I42.8 NICM (NONISCHEMIC CARDIOMYOPATHY): Primary | ICD-10-CM

## 2022-02-22 LAB
ANION GAP SERPL CALCULATED.3IONS-SCNC: 14 MMOL/L (ref 5–15)
BUN SERPL-MCNC: 14 MG/DL (ref 8–23)
BUN/CREAT SERPL: 16.1 (ref 7–25)
CALCIUM SPEC-SCNC: 9.6 MG/DL (ref 8.6–10.5)
CHLORIDE SERPL-SCNC: 103 MMOL/L (ref 98–107)
CO2 SERPL-SCNC: 23 MMOL/L (ref 22–29)
CREAT SERPL-MCNC: 0.87 MG/DL (ref 0.76–1.27)
DIGOXIN SERPL-MCNC: 0.6 NG/ML (ref 0.6–1.2)
GFR SERPL CREATININE-BSD FRML MDRD: 84 ML/MIN/1.73
GLUCOSE SERPL-MCNC: 133 MG/DL (ref 65–99)
POTASSIUM SERPL-SCNC: 4.4 MMOL/L (ref 3.5–5.2)
SODIUM SERPL-SCNC: 140 MMOL/L (ref 136–145)

## 2022-02-22 PROCEDURE — 99214 OFFICE O/P EST MOD 30 MIN: CPT | Performed by: INTERNAL MEDICINE

## 2022-02-22 PROCEDURE — 80048 BASIC METABOLIC PNL TOTAL CA: CPT

## 2022-02-22 PROCEDURE — 93000 ELECTROCARDIOGRAM COMPLETE: CPT | Performed by: INTERNAL MEDICINE

## 2022-02-22 PROCEDURE — 36415 COLL VENOUS BLD VENIPUNCTURE: CPT

## 2022-02-22 PROCEDURE — 80162 ASSAY OF DIGOXIN TOTAL: CPT

## 2022-02-22 RX ORDER — DIGOXIN 125 MCG
125 TABLET ORAL DAILY
Qty: 90 TABLET | Refills: 3 | Status: SHIPPED | OUTPATIENT
Start: 2022-02-22

## 2022-02-22 NOTE — PROGRESS NOTES
PATIENTINFORMATION    Date of Office Visit: 2022  Encounter Provider: Piotr Montemayor MD  Place of Service: [facility] CARDIOLOGY  Patient Name: Augustine Chen  : 1940    Subjective:     Encounter Date:2022      Patient ID: Augustine Chen is a 81 y.o. male.    No chief complaint on file.    HPI  Mr. Chen is a pleasant 81 years old gentleman with past history of nonischemic cardiomyopathy and atrial fibrillation came to cardiology clinic for follow-up visit.  Patient was accompanied by his wife.  He had a fall accident few months ago with fibular fracture on the left side for which she has been wearing a boot and going to be removed next week.  Otherwise no significant acute events since last clinic visit.  He is reasonably active and walks regularly during summertime and denies any exertional symptoms. Patient is compliant with current medical regimen and denies any significant side effects from medications. Otherwise patient denies any chest pain, significant shortness of breath, orthopnea, PND, extremity swelling, palpitations, presyncope or syncope.  No bleeding complications on Eliquis.      ROS  All systems reviewed and negative except as noted in HPI.    Past Medical History:   Diagnosis Date   • CHF (congestive heart failure) (HCC)    • Diabetes mellitus (HCC)    • Hyperlipidemia    • Hypertension        Past Surgical History:   Procedure Laterality Date   • CARDIAC CATHETERIZATION N/A 10/21/2019    Procedure: Right and Left Heart Cath;  Surgeon: Ashley Walker MD;  Location:  [facility] INVAS[facility];  Service: Cardiovascular   • CARDIAC CATHETERIZATION N/A 10/21/2019    Procedure: Coronary angiography;  Surgeon: Ashley Walker MD;  Location:  [facility];  Service: Cardiovascular   • CARDIAC CATHETERIZATION N/A 10/21/2019    Procedure: Left ventriculography- pressures only- no lv injection;  Surgeon: Ashley Walker MD;  Location:  [facility] CATH  "INVASIVE LOCATION;  Service: Cardiovascular   • CATARACT EXTRACTION WITH INTRAOCULAR LENS IMPLANT         Social History     Socioeconomic History   • Marital status:    Tobacco Use   • Smoking status: Never Smoker   • Smokeless tobacco: Never Used   • Tobacco comment: caffeine use - decaf coffee   Vaping Use   • Vaping Use: Never used   Substance and Sexual Activity   • Alcohol use: Defer     Alcohol/week: 0.0 standard drinks     Comment: Socially   • Drug use: No   • Sexual activity: Yes     Partners: Female       Family History   Problem Relation Age of Onset   • Heart disease Mother    • Heart attack Mother    • Heart attack Brother            ECG 12 Lead    Date/Time: 2/22/2022 8:49 AM  Performed by: Piotr Montemayor MD  Authorized by: Piotr Montemayor MD   Comparison: compared with previous ECG from 8/6/2021  Comparison to previous ECG: Hear rate uncontrolled with rate of 140 bpm on this tracing.  Also PVCs noted  Rhythm: atrial fibrillation  Ectopy: unifocal PVCs  Rate: normal  Conduction: conduction normal  ST Segments: ST segments normal  T Waves: T waves normal  QRS axis: normal  Other: no other findings    Clinical impression: abnormal EKG               Objective:     /76 (BP Location: Right arm)   Pulse 114   Ht 167.6 cm (66\")   Wt 113 kg (248 lb 12.8 oz)   SpO2 96%   BMI 40.16 kg/m²  Body mass index is 40.16 kg/m².     Constitutional:       General: Not in acute distress.     Appearance: Well-developed. Not diaphoretic.   Eyes:      Pupils: Pupils are equal, round, and reactive to light.   HENT:      Head: Normocephalic and atraumatic.   Neck:      Thyroid: No thyromegaly.   Pulmonary:      Effort: Pulmonary effort is normal. No respiratory distress.      Breath sounds: Normal breath sounds. No wheezing. No rales.   Chest:      Chest wall: Not tender to palpatation.   Cardiovascular:      Tachycardia present. Irregularly irregular rhythm.      No gallop.   Pulses:     Intact " distal pulses.   Edema:     Peripheral edema absent.   Abdominal:      General: Bowel sounds are normal. There is no distension.      Palpations: Abdomen is soft.      Tenderness: There is no guarding.   Musculoskeletal: Normal range of motion.         General: No deformity.      Cervical back: Normal range of motion and neck supple. Skin:     General: Skin is warm and dry.      Findings: No rash.   Neurological:      Mental Status: Alert and oriented to person, place, and time.      Cranial Nerves: No cranial nerve deficit.      Deep Tendon Reflexes: Reflexes are normal and symmetric.   Psychiatric:         Judgment: Judgment normal.         Review Of Data: I have reviewed pertinent recent labs, images and documents and pertinent findings included in HPI or assessment below.    Lipid Panel    Lipid Panel 9/10/21 12/17/21 2/17/22   Total Cholesterol 149 126 134   Triglycerides 264 (A) 224 (A) 220 (A)   HDL Cholesterol 36 (A) 36 (A) 36 (A)   VLDL Cholesterol 43 (A) 36 36   LDL Cholesterol  70 54 62   LDL/HDL Ratio 1.67 1.26 1.50   (A) Abnormal value                Assessment/Plan:         NICM (nonischemic cardiomyopathy) (CMS/HCC)  -Clinically compensated  -On Lasix 40 once or twice a day  -On lisinopril and extended release metoprolol  -Repeat echocardiogram    Atrial fibrillation, persistent with RVR-currently on digoxin 125 mcg/day and metoprolol succinate 100 mg p.o. daily  Chronic anticoagulation  -Check digoxin level  -Get 48-hour Holter to monitor heart rate at home    Morbidly obese-I will order home sleep study    Hypercholesterolemia-on statin-lipid panel at goal    Pulmonary hypertension-per echocardiogram in 2020-patient will get sleep study    Hypertension-controlled on current regimen    Type 2 DM - A1C at goal on on current regimen that includes Jardiance      Sleep history reviewed with patient.  Decision to order home sleep study reviewed with patient and patient is agreeable.    Return to clinic in  6 months or sooner with any concerning symptoms.      Diagnosis and plan of care discussed with patient and verbalized understanding.            Your medication list          Accurate as of February 22, 2022  9:00 AM. If you have any questions, ask your nurse or doctor.            CONTINUE taking these medications      Instructions Last Dose Given Next Dose Due   apixaban 5 MG tablet tablet  Commonly known as: ELIQUIS      Take 5 mg by mouth Every 12 (Twelve) Hours.       aspirin 81 MG chewable tablet      Chew 81 mg.       digoxin 125 MCG tablet  Commonly known as: LANOXIN      Take 1 tablet by mouth Daily.       furosemide 40 MG tablet  Commonly known as: LASIX      Take 1 tablet by mouth 2 (Two) Times a Day.       glipizide 10 MG tablet  Commonly known as: GLUCOTROL      Take 1 tablet by mouth 2 (Two) Times a Day Before Meals.       HYDROcodone-acetaminophen 5-325 MG per tablet  Commonly known as: NORCO      1-2 oral Q6H PRN moderate to severe pain       lisinopril 20 MG tablet  Commonly known as: PRINIVIL,ZESTRIL      Take 1 tablet by mouth Daily.       metoprolol succinate  MG 24 hr tablet  Commonly known as: TOPROL-XL      Take 100 mg by mouth 2 (two) times a day.       metoprolol succinate XL 25 MG 24 hr tablet  Commonly known as: TOPROL-XL      Take 1 tablet by mouth Daily.       potassium chloride 20 MEQ CR tablet  Commonly known as: K-DUR,KLOR-CON      Take 1 tablet by mouth Daily.       PrilOSEC OTC 20 MG EC tablet  Generic drug: omeprazole OTC      Take 20 mg by mouth Daily As Needed.       simvastatin 20 MG tablet  Commonly known as: ZOCOR      Take 20 mg by mouth Every Evening.       Synjardy XR  MG tablet sustained-release 24 hour  Generic drug: Empagliflozin-metFORMIN HCl ER      Take 1 tablet by mouth Daily.             Where to Get Your Medications      You can get these medications from any pharmacy    Bring a paper prescription for each of these medications  · digoxin 125 MCG  tablet             Piotr Montemayor MD  02/22/22  09:00 EST

## 2022-02-28 ENCOUNTER — OFFICE VISIT (OUTPATIENT)
Dept: ORTHOPEDIC SURGERY | Facility: CLINIC | Age: 82
End: 2022-02-28

## 2022-02-28 VITALS — TEMPERATURE: 96.9 F | HEIGHT: 66 IN | WEIGHT: 240 LBS | BODY MASS INDEX: 38.57 KG/M2

## 2022-02-28 DIAGNOSIS — Z09 FRACTURE FOLLOW-UP: ICD-10-CM

## 2022-02-28 DIAGNOSIS — M25.572 LEFT ANKLE PAIN, UNSPECIFIED CHRONICITY: Primary | ICD-10-CM

## 2022-02-28 PROCEDURE — 73610 X-RAY EXAM OF ANKLE: CPT | Performed by: ORTHOPAEDIC SURGERY

## 2022-02-28 PROCEDURE — 99024 POSTOP FOLLOW-UP VISIT: CPT | Performed by: ORTHOPAEDIC SURGERY

## 2022-02-28 NOTE — PROGRESS NOTES
Augustine WILSON Sample : 1940 MRN: [id number] DATE: 2022     CC:  2.5 months s/p closed treatment of left ankle fracture    HPI: Patient returns to clinic today for follow-up of the ankle.  Patient reports that the pain is resolved.  Motion is progressing.  Denies any new concerns or issues.  Reports that he quit using the boot and has been full weight bearing.  He says the ankle feels fine.    Vitals:    22 0757   Temp: 96.9 °F (36.1 °C)       Exam:  Contour of ankle appears normal.  No significant tenderness.  Ankle moves fluidly.  Motion is improved but still limited relative to the contralateral side.  Good motor and sensory function in the foot and toes.  Palpable pulses with good cap refill.      Imaging  AP, mortise and lateral xrays of the left ankle are ordered and reviewed by me to evaluate alignment and for comparison purposes. There has been interval callous formation laterally.  Alignment is well maintained.    Impression:  2.5 months s/p closed treatment of left ankle fracture    Plan:    1.  Progress ROM and strengthening as tolerated.  2.  Advance weight bearing and transition out of boot into regular shoes.  3.  Encouraged the patient that residual soreness and swelling may persist up to 6 months after injury    Demond Mckeon MD    2022

## 2022-03-02 DIAGNOSIS — I48.19 ATRIAL FIBRILLATION, PERSISTENT: Primary | ICD-10-CM

## 2022-03-03 ENCOUNTER — TELEPHONE (OUTPATIENT)
Dept: CARDIOLOGY | Facility: CLINIC | Age: 82
End: 2022-03-03

## 2022-03-03 NOTE — TELEPHONE ENCOUNTER
Reviewed results and recommendations with Augustine WILSON Gustavo's wife, Antonieta.  She verbalized understanding and repeated back instructions.    Antonieta also stated that they will not be doing the sleep study and have cancelled it.    Thank you,  Mary Jane Eckert RN  Triage Nurse [facility] [place]

## 2022-03-03 NOTE — TELEPHONE ENCOUNTER
Left voicemail for Augustine KATIE Sample requesting callback.    Thank you,  Mary Jane Eckert RN  Triage Nurse [facility]

## 2022-03-03 NOTE — TELEPHONE ENCOUNTER
----- Message from Piotr Montemayor MD sent at 3/2/2022  4:37 PM EST -----  Can you please notify patient that I have reviewed Holter and HR slightly on the higher side. Double up dose of digoxin from 125 to 250. Go to lab on Wednesday  morning for digoxin level check ( should take new dose for about 5  days before check and should take digoxin after blood draw that morning)   I will schedule him an echo and adjust his meds after the echo result. Let me know if he has any questions  Thank you !

## 2022-03-09 ENCOUNTER — LAB (OUTPATIENT)
Dept: LAB | Facility: HOSPITAL | Age: 82
End: 2022-03-09

## 2022-03-09 DIAGNOSIS — I48.19 ATRIAL FIBRILLATION, PERSISTENT: ICD-10-CM

## 2022-03-09 LAB — DIGOXIN SERPL-MCNC: 1.2 NG/ML (ref 0.6–1.2)

## 2022-03-09 PROCEDURE — 80162 ASSAY OF DIGOXIN TOTAL: CPT

## 2022-03-09 PROCEDURE — 36415 COLL VENOUS BLD VENIPUNCTURE: CPT

## 2022-03-10 NOTE — PROGRESS NOTES
Please notify patient that his digoxin level is with in range on current dose. We will see him in one month in clinic. Let me know if he has any questions. Thank you

## 2022-03-11 NOTE — PROGRESS NOTES
Reviewed results and recommendations with Augustine Chen.  Patient verbalized understanding.    Patient declined to schedule 1 month follow up when I called the result.      Scheduling can you contact patient and get him scheduled?    Thank you,  Mary Jane Eckert RN  Triage Nurse [facility] [place]

## 2022-03-11 NOTE — PROGRESS NOTES
Scheduling can you contact patient and get him scheduled for 1 month follow up?     Thank you,  Mary Jane Eckert RN  Triage Nurse [facility] [place]

## 2022-03-17 ENCOUNTER — APPOINTMENT (OUTPATIENT)
Dept: SLEEP MEDICINE | Facility: HOSPITAL | Age: 82
End: 2022-03-17

## 2022-03-18 ENCOUNTER — HOSPITAL ENCOUNTER (OUTPATIENT)
Dept: CARDIOLOGY | Facility: HOSPITAL | Age: 82
Discharge: HOME | End: 2022-03-18
Admitting: INTERNAL MEDICINE

## 2022-03-18 VITALS
HEIGHT: 66 IN | HEART RATE: 100 BPM | SYSTOLIC BLOOD PRESSURE: 162 MMHG | WEIGHT: 240 LBS | DIASTOLIC BLOOD PRESSURE: 73 MMHG | BODY MASS INDEX: 38.57 KG/M2

## 2022-03-18 DIAGNOSIS — I42.8 NICM (NONISCHEMIC CARDIOMYOPATHY): ICD-10-CM

## 2022-03-18 LAB
ASCENDING AORTA: 3.1 CM
BH CV ECHO LEFT VENTRICLE GLOBAL LONGITUDINAL STRAIN: -14.8 %
BH CV ECHO MEAS - ACS: 1.64 CM
BH CV ECHO MEAS - AO MAX PG: 5.3 MMHG
BH CV ECHO MEAS - AO MEAN PG: 3.5 MMHG
BH CV ECHO MEAS - AO ROOT DIAM: 3.3 CM
BH CV ECHO MEAS - AO V2 MAX: 115.6 CM/SEC
BH CV ECHO MEAS - AO V2 VTI: 24.7 CM
BH CV ECHO MEAS - AVA(I,D): 2.08 CM2
BH CV ECHO MEAS - EDV(CUBED): 157.9 ML
BH CV ECHO MEAS - EDV(MOD-SP2): 105 ML
BH CV ECHO MEAS - EDV(MOD-SP4): 82 ML
BH CV ECHO MEAS - EF(MOD-BP): 28.9 %
BH CV ECHO MEAS - EF(MOD-SP2): 34.3 %
BH CV ECHO MEAS - EF(MOD-SP4): 30.5 %
BH CV ECHO MEAS - ESV(CUBED): 112.6 ML
BH CV ECHO MEAS - ESV(MOD-SP2): 69 ML
BH CV ECHO MEAS - ESV(MOD-SP4): 57 ML
BH CV ECHO MEAS - FS: 10.7 %
BH CV ECHO MEAS - IVS/LVPW: 0.95 CM
BH CV ECHO MEAS - IVSD: 1.37 CM
BH CV ECHO MEAS - LAT PEAK E' VEL: 9.1 CM/SEC
BH CV ECHO MEAS - LV DIASTOLIC VOL/BSA (35-75): 37.9 CM2
BH CV ECHO MEAS - LV MASS(C)D: 329.5 GRAMS
BH CV ECHO MEAS - LV MAX PG: 3.4 MMHG
BH CV ECHO MEAS - LV MEAN PG: 1.26 MMHG
BH CV ECHO MEAS - LV SYSTOLIC VOL/BSA (12-30): 26.4 CM2
BH CV ECHO MEAS - LV V1 MAX: 92.4 CM/SEC
BH CV ECHO MEAS - LV V1 VTI: 16.5 CM
BH CV ECHO MEAS - LVIDD: 5.4 CM
BH CV ECHO MEAS - LVIDS: 4.8 CM
BH CV ECHO MEAS - LVOT AREA: 3.1 CM2
BH CV ECHO MEAS - LVOT DIAM: 1.99 CM
BH CV ECHO MEAS - LVPWD: 1.44 CM
BH CV ECHO MEAS - MED PEAK E' VEL: 8.8 CM/SEC
BH CV ECHO MEAS - MR MAX PG: 119.6 MMHG
BH CV ECHO MEAS - MR MAX VEL: 546.8 CM/SEC
BH CV ECHO MEAS - MV DEC SLOPE: 612.8 CM/SEC2
BH CV ECHO MEAS - MV DEC TIME: 0.13 MSEC
BH CV ECHO MEAS - MV E MAX VEL: 109 CM/SEC
BH CV ECHO MEAS - MV MAX PG: 6.1 MMHG
BH CV ECHO MEAS - MV MEAN PG: 2.36 MMHG
BH CV ECHO MEAS - MV V2 VTI: 28 CM
BH CV ECHO MEAS - MVA(VTI): 1.83 CM2
BH CV ECHO MEAS - PA ACC TIME: 0.05 SEC
BH CV ECHO MEAS - PA PR(ACCEL): 55.2 MMHG
BH CV ECHO MEAS - PA V2 MAX: 84.2 CM/SEC
BH CV ECHO MEAS - RAP SYSTOLE: 8 MMHG
BH CV ECHO MEAS - RV MAX PG: 1.02 MMHG
BH CV ECHO MEAS - RV V1 MAX: 50.5 CM/SEC
BH CV ECHO MEAS - RV V1 VTI: 9.4 CM
BH CV ECHO MEAS - RVOT DIAM: 2.48 CM
BH CV ECHO MEAS - RVSP: 39.2 MMHG
BH CV ECHO MEAS - SI(MOD-SP2): 16.7 ML/M2
BH CV ECHO MEAS - SI(MOD-SP4): 11.6 ML/M2
BH CV ECHO MEAS - SUP REN AO DIAM: 2.2 CM
BH CV ECHO MEAS - SV(LVOT): 51.4 ML
BH CV ECHO MEAS - SV(MOD-SP2): 36 ML
BH CV ECHO MEAS - SV(MOD-SP4): 25 ML
BH CV ECHO MEAS - SV(RVOT): 45.7 ML
BH CV ECHO MEAS - TAPSE (>1.6): 1.8 CM
BH CV ECHO MEAS - TR MAX PG: 31.2 MMHG
BH CV ECHO MEAS - TR MAX VEL: 279.1 CM/SEC
BH CV ECHO MEASUREMENTS AVERAGE E/E' RATIO: 12.18
BH CV XLRA - RV BASE: 3.3 CM
BH CV XLRA - RV LENGTH: 6.5 CM
BH CV XLRA - RV MID: 2.8 CM
BH CV XLRA - TDI S': 11.5 CM/SEC
LEFT ATRIUM VOLUME INDEX: 36.4 ML/M2
MAXIMAL PREDICTED HEART RATE: 139 BPM
SINUS: 3.1 CM
STJ: 2.9 CM
STRESS TARGET HR: 118 BPM

## 2022-03-18 PROCEDURE — 93356 MYOCRD STRAIN IMG SPCKL TRCK: CPT | Performed by: INTERNAL MEDICINE

## 2022-03-18 PROCEDURE — 93306 TTE W/DOPPLER COMPLETE: CPT | Performed by: INTERNAL MEDICINE

## 2022-03-18 PROCEDURE — 93306 TTE W/DOPPLER COMPLETE: CPT

## 2022-03-18 PROCEDURE — 93356 MYOCRD STRAIN IMG SPCKL TRCK: CPT

## 2022-03-20 NOTE — PROGRESS NOTES
Please notify patient has heart function has slightly improved compared to prior echo. Advise to increase dose of metoprolol to 150 twice daily. I can call in for 50mg pills to current 100mg pill if that is more convenient   Check BP and HR regularly at home. Let me know if he has any questions     Thank you

## 2022-03-21 ENCOUNTER — TELEPHONE (OUTPATIENT)
Dept: CARDIOLOGY | Facility: CLINIC | Age: 82
End: 2022-03-21

## 2022-03-21 RX ORDER — METOPROLOL SUCCINATE 50 MG/1
50 TABLET, EXTENDED RELEASE ORAL 2 TIMES DAILY
Qty: 180 TABLET | Refills: 3 | Status: SHIPPED | OUTPATIENT
Start: 2022-03-21 | End: 2022-04-05 | Stop reason: ALTCHOICE

## 2022-03-21 NOTE — TELEPHONE ENCOUNTER
Reviewed results and recommendations with Augustine Chen.  Patient verbalized understanding.    Patient would like you to send prescription for 50 mg metoprolol tablet.    Encouraged patient to keep log of BP/HR over the next 1-2 weeks.  Patient does not have a BP machine, but said he would go every other day to check it at a public machine.    Thank you,  Mary Jane Eckert RN  Triage Nurse [facility] [place]

## 2022-03-21 NOTE — TELEPHONE ENCOUNTER
Discussed Augustine WILSON Sample with Dr. Montemayor.  Dr. Montemayor would like patient to follow-up with [facility] in office in 1-2 weeks and requests patient bring in BP logs to visit as well.    Called patient to schedule 1-2 week follow-up, however, patient declined this appointment, stating that there are too many follow-ups.  Patient stated he would just see Dr. Montemayor at the appointment in May and would call the office if he has any problems before then.     Reviewed medication changes with patient again and he verbalized understanding.    Thank you,  Mary Jane Eckert RN  Triage Nurse [facility] [place]

## 2022-03-22 NOTE — TELEPHONE ENCOUNTER
Augustine Chen called to notify that he picked up his prescription.  Reviewed medication change with patient and he verbalized understanding.    Patient also scheduled 2 week followup with VINNY.    Thank you,  Mary Jane Eckert RN  Triage Nurse [facility] [place]

## 2022-04-05 ENCOUNTER — OFFICE VISIT (OUTPATIENT)
Dept: CARDIOLOGY | Facility: CLINIC | Age: 82
End: 2022-04-05

## 2022-04-05 VITALS
SYSTOLIC BLOOD PRESSURE: 130 MMHG | HEIGHT: 66 IN | BODY MASS INDEX: 39.92 KG/M2 | WEIGHT: 248.4 LBS | HEART RATE: 97 BPM | DIASTOLIC BLOOD PRESSURE: 70 MMHG

## 2022-04-05 DIAGNOSIS — I42.8 NICM (NONISCHEMIC CARDIOMYOPATHY): ICD-10-CM

## 2022-04-05 DIAGNOSIS — I48.19 ATRIAL FIBRILLATION, PERSISTENT: ICD-10-CM

## 2022-04-05 DIAGNOSIS — I48.19 ATRIAL FIBRILLATION, PERSISTENT: Primary | ICD-10-CM

## 2022-04-05 DIAGNOSIS — I10 PRIMARY HYPERTENSION: Primary | ICD-10-CM

## 2022-04-05 PROCEDURE — 99214 OFFICE O/P EST MOD 30 MIN: CPT | Performed by: NURSE PRACTITIONER

## 2022-04-05 PROCEDURE — 93000 ELECTROCARDIOGRAM COMPLETE: CPT | Performed by: NURSE PRACTITIONER

## 2022-04-05 RX ORDER — METOPROLOL SUCCINATE 200 MG/1
200 TABLET, EXTENDED RELEASE ORAL 2 TIMES DAILY
Qty: 180 TABLET | Refills: 0 | Status: SHIPPED | OUTPATIENT
Start: 2022-04-05 | End: 2023-08-15

## 2022-04-05 NOTE — PROGRESS NOTES
Date of Office Visit: 2022  Encounter Provider: Patito Martinez, COLLEEN, APRN  Place of Service: [facility]  Patient Name: Augustine Chen  :1940        Subjective:     Chief Complaint:  Nonischemic cardiomyopathy, persistent atrial fibrillation      History of Present Illness:  Augustine Chen is a 81 y.o. male patient of Dr. Montemayor.  This patient is new to me and I have reviewed his records.    Patient has a history of nonischemic cardiomyopathy, atrial fibrillation, morbid obesity, hyperlipidemia, pulmonary hypertension, hypertension, hypertensive heart disease, type 2 diabetes.    Patient has a history of nonischemic cardiomyopathy with echo 10/2019 showing EF of 23% with severe global hypokinesis, grade 2 diastolic dysfunction, mild to moderately dilated RV cavity with mildly reduced RV systolic function, mild to moderate valvular regurgitation with RVSP of 52 mmHg.  He had heart cath in  showing moderate nonobstructive disease with severe pulmonary hypertension.  He later [place] showing EF of 30 to 35%.  Subsequent limited follow-up echo [place] 2020 showed EF of 36.1% with mild LV enlargement with moderate global LV hypokinesis.    Patient transferred care to Dr. Montemayor 2022.  Heart rate was elevated at that time and Holter showed PVCs occurring 5.58% of the time with one episode of 6 beat run. Echo 3/2022 showed EF of 28.9% with mild valvular regurgitation and RVSP of 39 mmHg.  Metoprolol XL was subsequently increased.        Patient presents to office today for follow-up appointment.  Patient's wife is with him in the office today, per patient preference.  Patient reports he is feeling well since last visit.  Blood pressure at home has been staying 120s-130s over 70s. He denies any cardiac symptoms or concerns.  Denies any chest pain or discomfort, shortness of breath, shortness of breath with exertion,  palpitations, lower extremity edema, dizziness, syncope, near syncope, falls, fatigue, or abnormal bleeding.  Appears euvolemic on exam.          Past Medical History:   Diagnosis Date   • CHF (congestive heart failure) (HCC)    • Diabetes mellitus (HCC)    • Hyperlipidemia    • Hypertension      Past Surgical History:   Procedure Laterality Date   • CARDIAC CATHETERIZATION N/A 10/21/2019    Procedure: Right and Left Heart Cath;  Surgeon: Ashley Walker MD;  Location:  [facility];  Service: Cardiovascular   • CARDIAC CATHETERIZATION N/A 10/21/2019    Procedure: Coronary angiography;  Surgeon: Ashley Walker MD;  Location:  [facility];  Service: Cardiovascular   • CARDIAC CATHETERIZATION N/A 10/21/2019    Procedure: Left ventriculography- pressures only- no lv injection;  Surgeon: Ashley Walker MD;  Location: [facility];  Service: Cardiovascular   • CATARACT EXTRACTION WITH INTRAOCULAR LENS IMPLANT       Outpatient Medications Prior to Visit   Medication Sig Dispense Refill   • apixaban (ELIQUIS) 5 MG tablet tablet Take 5 mg by mouth Every 12 (Twelve) Hours.     • digoxin (LANOXIN) 125 MCG tablet Take 1 tablet by mouth Daily. 90 tablet 3   • Empagliflozin-metFORMIN HCl ER (Synjardy XR)  MG tablet sustained-release 24 hour Take 1 tablet by mouth Daily. 30 tablet 6   • furosemide (LASIX) 40 MG tablet Take 1 tablet by mouth 2 (Two) Times a Day. 60 tablet 0   • glipizide (GLUCOTROL) 10 MG tablet Take 1 tablet by mouth 2 (Two) Times a Day Before Meals. 180 tablet 3   • HYDROcodone-acetaminophen (NORCO) 5-325 MG per tablet 1-2 oral Q6H PRN moderate to severe pain 40 tablet 0   • lisinopril (PRINIVIL,ZESTRIL) 20 MG tablet Take 1 tablet by mouth Daily. 90 tablet 3   • omeprazole OTC (PriLOSEC OTC) 20 MG EC tablet Take 20 mg by mouth Daily As Needed.     • potassium chloride (K-DUR,KLOR-CON) 20 MEQ CR tablet Take 1 tablet by mouth Daily. 30 tablet 0   •  "simvastatin (ZOCOR) 20 MG tablet Take 20 mg by mouth Every Evening.  3   • metoprolol succinate XL (TOPROL-XL) 50 MG 24 hr tablet Take 1 tablet by mouth 2 (Two) Times a Day. 180 tablet 3   • metoprolol succinate XL (TOPROL-XL) 100 MG 24 hr tablet Take 100 mg by mouth 2 (two) times a day.       No facility-administered medications prior to visit.       Allergies as of 2022 - Reviewed 2022   Allergen Reaction Noted   • Azithromycin Swelling 10/18/2019     Social History     Socioeconomic History   • Marital status:    Tobacco Use   • Smoking status: Former Smoker     Packs/day: 0.00     Years: 0.00     Pack years: 0.00     Quit date:      Years since quittin.2   • Smokeless tobacco: Never Used   • Tobacco comment: caffeine use - decaf coffee   Vaping Use   • Vaping Use: Never used   Substance and Sexual Activity   • Alcohol use: Defer     Alcohol/week: 0.0 standard drinks     Comment: Socially   • Drug use: No   • Sexual activity: Yes     Partners: Female     Family History   Problem Relation Age of Onset   • Heart disease Mother    • Heart attack Mother    • Heart attack Brother        Review of Systems   Constitutional: Negative for malaise/fatigue.   Cardiovascular:        SEE HPI   Respiratory: Negative for shortness of breath.    Hematologic/Lymphatic: Negative for bleeding problem.   Musculoskeletal: Negative for falls.   Gastrointestinal: Negative for melena.   Genitourinary: Negative for hematuria.   Neurological: Negative for dizziness.   Psychiatric/Behavioral: Negative for altered mental status.          Objective:     Vitals:    22 0813   BP: 130/70   BP Location: Left arm   Pulse: 97   Weight: 113 kg (248 lb 6.4 oz)   Height: 167.6 cm (66\")     Body mass index is 40.09 kg/m².      PHYSICAL EXAM:  Constitutional:       General: Not in acute distress.     Appearance: Well-developed. Morbidly obese. Not diaphoretic.   Eyes:      Pupils: Pupils are equal, round, and reactive " to light.   HENT:      Head: Normocephalic and atraumatic.   Pulmonary:      Effort: Pulmonary effort is normal. No respiratory distress.      Breath sounds: Normal breath sounds. No wheezing. No rales.   Cardiovascular:      Normal rate. Irregularly irregular rhythm.      Murmurs: There is no murmur.      No gallop. No click. No rub.   Edema:     Peripheral edema absent.   Abdominal:      General: Bowel sounds are normal. There is no distension.      Palpations: Abdomen is soft.   Musculoskeletal: Normal range of motion.         General: No tenderness or deformity.      Cervical back: Neck supple. Skin:     General: Skin is warm and dry.      Findings: No erythema or rash.   Neurological:      Mental Status: Alert and oriented to person, place, and time.   Psychiatric:         Behavior: Behavior normal.         Judgment: Judgment normal.             ECG 12 Lead    Date/Time: 4/5/2022 8:42 AM  Performed by: Patito Martinez DNP, APRN  Authorized by: Patito Martinez DNP, APRN   Comparison: compared with previous ECG from 2/22/2022  Rhythm: atrial fibrillation  Ectopy comments: PVC  BPM: 97  Comments: No significant changes from previous EKG              Assessment:       Diagnosis Plan   1. Primary hypertension  Adult Transthoracic Echo Complete w/ Color, Spectral and Contrast if Necessary Per Protocol   2. NICM (nonischemic cardiomyopathy) (HCC)  Adult Transthoracic Echo Complete w/ Color, Spectral and Contrast if Necessary Per Protocol   3. Atrial fibrillation, persistent (HCC)  Adult Transthoracic Echo Complete w/ Color, Spectral and Contrast if Necessary Per Protocol         Plan:     1. Nonischemic cardiomyopathy: EF 28.9% on 3/2022 echo.  On metoprolol XL.  Also on lisinopril and Lasix.  Patient appears well compensated.  He declines any additional testing at this time, he is adamant about this.  However is amenable to follow-up echo in August prior to office follow-up.  Need to optimize blood pressure and  heart rate control.  Increase metoprolol XL as above.  2. Persistent atrial fibrillation: Anticoagulated with Eliquis.  Denies falls or abnormal bleeding.  On metoprolol xl.  Also on digoxin--last level was borderline elevated and patient reports he did take his digoxin pill prior to getting the labs drawn.  He has already taken digoxin today.  He will stop by the outpatient lab 1 day within the next week to look at rechecking lab level before he takes his digoxin for the day.  Patient verbalized understanding of importance of ensuring levels are stable and verbalized intent to get this checked this week.  Patient declines considering cardioversion though we did discuss potential benefits from cardiomyopathy standpoint.  3. PVCs: Occurred 5.58% of the time on recent Holter monitor.  Now on increased beta-blocker dose, as above.  Asymptomatic.  Declines additional testing.  4. Hypertension: Blood pressure 120s to 130s systolic.  Discussed with Dr. Montemayor who recommends increasing metoprolol XL to 200mg BID.  Patient will call some updated heart rate and blood pressure readings in 1 week or sooner for issues or concerns.  5. Pulmonary hypertension: Mild.  Sleep study was recommended.  Looks like patient canceled this.  Strongly recommended rescheduling sleep study.  Discussed risks of untreated sleep apnea.  Patient and wife verbalized understanding however they declined to consider.  6. Coronary artery disease: Moderate nonobstructive on 2019 heart cath.  EF was 23% on echo at that time.  7. Type 2 diabetes: Managed by outside provider.  On Jardiance.  8. Morbid obesity    Discussed patient case, previous testing, EKG, and plan of care with Dr. Albina MD.  Patient to keep August follow-up with Dr. Montemayor as scheduled.  Will look at rechecking echo at that time as well.  Patient declines any additional testing or interventions prior to that time.           Your medication list          Accurate as of  April 5, 2022  9:16 AM. If you have any questions, ask your nurse or doctor.            CHANGE how you take these medications      Instructions Last Dose Given Next Dose Due   metoprolol succinate  MG 24 hr tablet  Commonly known as: TOPROL-XL  What changed:   · medication strength  · how much to take  · when to take this  Changed by: Patito Martinez, DNP, APRN      Take 1 tablet by mouth 2 (Two) Times a Day.          CONTINUE taking these medications      Instructions Last Dose Given Next Dose Due   apixaban 5 MG tablet tablet  Commonly known as: ELIQUIS      Take 5 mg by mouth Every 12 (Twelve) Hours.       digoxin 125 MCG tablet  Commonly known as: LANOXIN      Take 1 tablet by mouth Daily.       furosemide 40 MG tablet  Commonly known as: LASIX      Take 1 tablet by mouth 2 (Two) Times a Day.       glipizide 10 MG tablet  Commonly known as: GLUCOTROL      Take 1 tablet by mouth 2 (Two) Times a Day Before Meals.       HYDROcodone-acetaminophen 5-325 MG per tablet  Commonly known as: NORCO      1-2 oral Q6H PRN moderate to severe pain       lisinopril 20 MG tablet  Commonly known as: PRINIVIL,ZESTRIL      Take 1 tablet by mouth Daily.       omeprazole OTC 20 MG EC tablet  Commonly known as: PriLOSEC OTC      Take 20 mg by mouth Daily As Needed.       potassium chloride 20 MEQ CR tablet  Commonly known as: K-DUR,KLOR-CON      Take 1 tablet by mouth Daily.       simvastatin 20 MG tablet  Commonly known as: ZOCOR      Take 20 mg by mouth Every Evening.       Synjardy XR  MG tablet sustained-release 24 hour  Generic drug: Empagliflozin-metFORMIN HCl ER      Take 1 tablet by mouth Daily.             Where to Get Your Medications      These medications were sent to [facility] [address] [place] [id number] [facility] - [phone]  - [phone]   [address] [facility] [address]    Phone: [phone]   · metoprolol succinate  MG 24 hr tablet         The above medication  changes may not have been made by this provider.  Patient's medication list was updated to reflect medications they are currently taking including medication changes made by other providers.            Thanks,    Patito Martinez, COLLEEN, APRN  04/05/2022         Dictated utilizing Dragon dictation

## 2022-04-07 ENCOUNTER — TELEPHONE (OUTPATIENT)
Dept: CARDIOLOGY | Facility: CLINIC | Age: 82
End: 2022-04-07

## 2022-04-07 NOTE — TELEPHONE ENCOUNTER
This should not have increased blood pressure, if anything blood pressure should improve.    Any increased salt intake recently?    Please find out how blood pressure and heart rate are running over the last couple of days.  Please make sure he is still also taking lisinopril and other medications as prescribed.    Would make sure he is checking blood pressure at least an hour or 2 after morning medications and after sitting calmly a good 5 to 10 minutes.    Keep a log and call with some updated readings Monday or sooner for issues or concerns.

## 2022-04-07 NOTE — TELEPHONE ENCOUNTER
PT called and said he increased the metoprolol from 300 to 400 and his BP has increased. PT can be reached at [phone]

## 2022-04-08 ENCOUNTER — LAB (OUTPATIENT)
Dept: LAB | Facility: HOSPITAL | Age: 82
End: 2022-04-08

## 2022-04-08 DIAGNOSIS — I48.19 ATRIAL FIBRILLATION, PERSISTENT: ICD-10-CM

## 2022-04-08 LAB — DIGOXIN SERPL-MCNC: 0.6 NG/ML (ref 0.6–1.2)

## 2022-04-08 PROCEDURE — 36415 COLL VENOUS BLD VENIPUNCTURE: CPT

## 2022-04-08 PROCEDURE — 80162 ASSAY OF DIGOXIN TOTAL: CPT

## 2022-04-11 ENCOUNTER — TELEPHONE (OUTPATIENT)
Dept: CARDIOLOGY | Facility: CLINIC | Age: 82
End: 2022-04-11

## 2022-04-11 NOTE — TELEPHONE ENCOUNTER
HR looks improved.  BP on the lower side.  No dizziness with SBP <110?   Continue current medications for now.  Would have him continue to monitor HR and BP.   Call with updated HR and BP readings in a couple weeks or sooner for SBP <100 or symptoms with SBP <110.  However HR looks improved with current regimen.

## 2022-04-13 NOTE — TELEPHONE ENCOUNTER
I misunderstood the pt when I spoke to him earlier, and he said that he gave his BP to someone over the phone.    I do not see them documented, but they are as follows:    4/6  107/60  81  4/7  129/76  88  4/8  108/72  86  4/9  120/66  65  4/10  116/77  66  4/11  105/76  81  4/12  119/82  91  4/13  116/73  76    I reiterated to decrease his salt intake in his diet.

## 2022-04-13 NOTE — TELEPHONE ENCOUNTER
Do not see that we have received blood pressure log.  Would have him call with some updated readings in 1 week on the lower salt diet.

## 2022-04-13 NOTE — TELEPHONE ENCOUNTER
Spoke to pt, he states he does need to decrease his salt in take, and he will work on that.      He said he left  BP log with someone here at the office yesterday.

## 2022-04-14 NOTE — TELEPHONE ENCOUNTER
Blood pressure not elevated.  Looks well controlled.  Heart rate looks improved as well.  Would continue current regimen for now but call for any systolic blood pressure readings less than 100 or for symptoms with systolic blood pressure less than 110.  Or call if BP staying above 130/80 on average or for other issues or concerns prior to next visit.  Otherwise would keep August follow-up and echo with Dr. Montemayor as scheduled.

## 2022-04-21 RX ORDER — FUROSEMIDE 40 MG/1
40 TABLET ORAL 2 TIMES DAILY
Qty: 180 TABLET | Refills: 1 | Status: SHIPPED | OUTPATIENT
Start: 2022-04-21

## 2022-05-04 ENCOUNTER — OFFICE VISIT (OUTPATIENT)
Dept: INTERNAL MEDICINE | Facility: CLINIC | Age: 82
End: 2022-05-04

## 2022-05-04 VITALS
WEIGHT: 252.2 LBS | BODY MASS INDEX: 40.53 KG/M2 | OXYGEN SATURATION: 97 % | DIASTOLIC BLOOD PRESSURE: 71 MMHG | HEIGHT: 66 IN | SYSTOLIC BLOOD PRESSURE: 115 MMHG | HEART RATE: 71 BPM

## 2022-05-04 DIAGNOSIS — I10 ESSENTIAL HYPERTENSION: ICD-10-CM

## 2022-05-04 DIAGNOSIS — E11.59 TYPE 2 DIABETES MELLITUS WITH OTHER CIRCULATORY COMPLICATION, WITHOUT LONG-TERM CURRENT USE OF INSULIN: Primary | ICD-10-CM

## 2022-05-04 DIAGNOSIS — E78.2 MIXED HYPERLIPIDEMIA: ICD-10-CM

## 2022-05-04 PROCEDURE — 99214 OFFICE O/P EST MOD 30 MIN: CPT | Performed by: FAMILY MEDICINE

## 2022-05-04 RX ORDER — POTASSIUM CHLORIDE 20 MEQ/1
20 TABLET, EXTENDED RELEASE ORAL DAILY
Qty: 30 TABLET | Refills: 6 | Status: SHIPPED | OUTPATIENT
Start: 2022-05-04

## 2022-05-04 NOTE — PROGRESS NOTES
"Chief Complaint  3 Month Follow Up    Subjective          Augustine Chen presents to [facility]  History of Present Illness    Augustine Chen is an 81-year-old male who presents to the office today for a 3-month follow-up of his diabetes and blood pressure.     The patient brought a log of his blood pressure readings to the office today. His current blood pressure today in the office is 115/71 mmHg. For his blood pressure, he is currently taking lisinopril 20 mg daily, as well as metoprolol succinate  mg twice daily. He is also taking Lasix 40 mg twice daily as well. His blood pressure readings that he has brought to the office today show that his blood pressure was consistently around 107/60 mmHg to 119/82 mmHg over the month of 04/2022. His heart rate has consistently been in the 80s.     The patient has a blood glucose meter that also measures HbA1c and it looks as if the last HbA1c was checked in 02/2022, which was 6.8 percent. His fasting blood glucose levels measured at the end of 04/2022 was 147 mg/dL. For his diabetes, he is currently taking glipizide 10 mg twice a day. He is also taking Synjardy extended release  mg once a day.    The patient has a history of hyperlipidemia, for which he takes simvastatin 20 mg daily. He has inquiries regarding potassium. He has been taking potassium for a couple of years. He is requesting a hand-written prescription for his [facility]     Objective   Vital Signs:   /71 (BP Location: Left arm, Patient Position: Sitting, Cuff Size: Large Adult)   Pulse 71   Ht 167.6 cm (66\")   Wt 114 kg (252 lb 3.2 oz)   SpO2 97%   BMI 40.71 kg/m²     Physical Exam  Vitals and nursing note reviewed.   Constitutional:       Appearance: He is well-developed.   HENT:      Head: Normocephalic and atraumatic.   Musculoskeletal:      Cervical back: Normal range of motion and neck supple.   Neurological:     "  Mental Status: He is alert and oriented to person, place, and time.   Psychiatric:         Behavior: Behavior normal.        Result Review :                 Assessment and Plan    Diagnoses and all orders for this visit:    1. Type 2 diabetes mellitus with other circulatory complication, without long-term current use of insulin (HCC) (Primary)  -     Hemoglobin A1c  -     Comprehensive Metabolic Panel        - Continue current diabetic medications.    2. Essential hypertension  -     Comprehensive Metabolic Panel  -     CBC & Differential        - Continue current blood pressure medications.     3. Mixed hyperlipidemia  -     Comprehensive Metabolic Panel  -     Lipid Panel With LDL / HDL Ratio        - Continue simvastatin.      Other orders  -     potassium chloride (K-DUR,KLOR-CON) 20 MEQ CR tablet; Take 1 tablet by mouth Daily.  Dispense: 30 tablet; Refill: 6               Follow Up   No follow-ups on file.  Patient was given instructions and counseling regarding his condition or for health maintenance advice. Please see specific information pulled into the AVS if appropriate.     Transcribed from ambient dictation for Thony Mclean MD by Noemí Hoffman.  05/04/22   09:39 EDT    Patient verbalized consent to the visit recording.

## 2022-09-06 DIAGNOSIS — E11.59 TYPE 2 DIABETES MELLITUS WITH OTHER CIRCULATORY COMPLICATION, WITHOUT LONG-TERM CURRENT USE OF INSULIN: ICD-10-CM

## 2022-09-06 RX ORDER — GLIPIZIDE 10 MG/1
TABLET ORAL
Qty: 180 TABLET | Refills: 0 | Status: SHIPPED | OUTPATIENT
Start: 2022-09-06 | End: 2022-09-14 | Stop reason: SDUPTHER

## 2022-09-14 ENCOUNTER — OFFICE VISIT (OUTPATIENT)
Dept: INTERNAL MEDICINE | Facility: CLINIC | Age: 82
End: 2022-09-14

## 2022-09-14 VITALS
OXYGEN SATURATION: 96 % | DIASTOLIC BLOOD PRESSURE: 60 MMHG | SYSTOLIC BLOOD PRESSURE: 124 MMHG | WEIGHT: 255.7 LBS | HEIGHT: 66 IN | BODY MASS INDEX: 41.09 KG/M2 | HEART RATE: 66 BPM

## 2022-09-14 DIAGNOSIS — I10 ESSENTIAL HYPERTENSION: ICD-10-CM

## 2022-09-14 DIAGNOSIS — E78.2 MIXED HYPERLIPIDEMIA: ICD-10-CM

## 2022-09-14 DIAGNOSIS — E11.59 TYPE 2 DIABETES MELLITUS WITH OTHER CIRCULATORY COMPLICATION, WITHOUT LONG-TERM CURRENT USE OF INSULIN: Primary | ICD-10-CM

## 2022-09-14 PROCEDURE — 99214 OFFICE O/P EST MOD 30 MIN: CPT | Performed by: FAMILY MEDICINE

## 2022-09-14 RX ORDER — GLIPIZIDE 10 MG/1
10 TABLET ORAL
Qty: 180 TABLET | Refills: 1 | Status: SHIPPED | OUTPATIENT
Start: 2022-09-14 | End: 2023-03-15 | Stop reason: SDUPTHER

## 2022-09-14 RX ORDER — EMPAGLIFLOZIN, METFORMIN HYDROCHLORIDE 25; 1000 MG/1; MG/1
1 TABLET, EXTENDED RELEASE ORAL DAILY
Qty: 30 TABLET | Refills: 6 | Status: SHIPPED | OUTPATIENT
Start: 2022-09-14 | End: 2023-03-15

## 2022-09-14 NOTE — PROGRESS NOTES
"Chief Complaint  follow up to diabetes    Subjective        Augustine Chen presents to [facility]  History of Present Illness    Patient presented today's office visit with a past medical history of type 2 diabetes.  He is currently on Jardiance extended release  mg daily.  He is also taking glipizide 10 mg daily.  He denies any side effects of the medicine.  He has brought his fasting blood glucose levels which consistently show that they range in the 150s.  He denies any side effects of any of his medications.    Patient does have a past medical history of having hyperlipidemia.  Is current taking simvastatin 20 mg daily.  Denies any side effects of the medicine.    He has essential hypertension.  Blood pressure today is 124/60.  Is currently on lisinopril 20 mg daily, metoprolol succinate  mg daily, and Lasix 40 mg daily.  He does not have any side effects of the medicine.    Objective   Vital Signs:  /60 (BP Location: Right arm, Patient Position: Sitting, Cuff Size: Large Adult)   Pulse 66   Ht 167.6 cm (66\")   Wt 116 kg (255 lb 11.2 oz)   SpO2 96%   BMI 41.27 kg/m²   Estimated body mass index is 41.27 kg/m² as calculated from the following:    Height as of this encounter: 167.6 cm (66\").    Weight as of this encounter: 116 kg (255 lb 11.2 oz).          Physical Exam  Vitals and nursing note reviewed.   Constitutional:       Appearance: He is well-developed.   HENT:      Head: Normocephalic and atraumatic.   Musculoskeletal:      Cervical back: Normal range of motion and neck supple.   Neurological:      Mental Status: He is alert and oriented to person, place, and time.   Psychiatric:         Behavior: Behavior normal.        Result Review :                Assessment and Plan   Diagnoses and all orders for this visit:    1. Type 2 diabetes mellitus with other circulatory complication, without long-term current use of insulin (HCC) (Primary)  -     " Empagliflozin-metFORMIN HCl ER (Synjardy XR)  MG tablet sustained-release 24 hour; Take 1 tablet by mouth Daily.  Dispense: 30 tablet; Refill: 6  -     glipizide (GLUCOTROL) 10 MG tablet; Take 1 tablet by mouth 2 (Two) Times a Day Before Meals.  Dispense: 180 tablet; Refill: 1  -     Hemoglobin A1c  -     Continue Synjardy extended release  mg daily along with glipizide.    2. Essential hypertension  -     Comprehensive Metabolic Panel  -     CBC & Differential  -     Currently stable, continue current blood pressure medications.    3. Mixed hyperlipidemia  -     Lipid Panel With LDL / HDL Ratio  -     Continue simvastatin 20 mg daily.             Follow Up   No follow-ups on file.  Patient was given instructions and counseling regarding his condition or for health maintenance advice. Please see specific information pulled into the AVS if appropriate.

## 2022-09-26 ENCOUNTER — LAB (OUTPATIENT)
Dept: LAB | Facility: HOSPITAL | Age: 82
End: 2022-09-26

## 2022-09-26 LAB
ALBUMIN SERPL-MCNC: 3.7 G/DL (ref 3.5–5.2)
ALBUMIN/GLOB SERPL: 1.5 G/DL
ALP SERPL-CCNC: 39 U/L (ref 39–117)
ALT SERPL W P-5'-P-CCNC: 25 U/L (ref 1–41)
ANION GAP SERPL CALCULATED.3IONS-SCNC: 13 MMOL/L (ref 5–15)
AST SERPL-CCNC: 21 U/L (ref 1–40)
BASOPHILS # BLD AUTO: 0.04 10*3/MM3 (ref 0–0.2)
BASOPHILS NFR BLD AUTO: 0.7 % (ref 0–1.5)
BILIRUB SERPL-MCNC: 0.4 MG/DL (ref 0–1.2)
BUN SERPL-MCNC: 18 MG/DL (ref 8–23)
BUN/CREAT SERPL: 14.8 (ref 7–25)
CALCIUM SPEC-SCNC: 9.4 MG/DL (ref 8.6–10.5)
CHLORIDE SERPL-SCNC: 99 MMOL/L (ref 98–107)
CHOLEST SERPL-MCNC: 146 MG/DL (ref 0–200)
CO2 SERPL-SCNC: 27 MMOL/L (ref 22–29)
CREAT SERPL-MCNC: 1.22 MG/DL (ref 0.76–1.27)
DEPRECATED RDW RBC AUTO: 46.5 FL (ref 37–54)
EGFRCR SERPLBLD CKD-EPI 2021: 59.2 ML/MIN/1.73
EOSINOPHIL # BLD AUTO: 0.1 10*3/MM3 (ref 0–0.4)
EOSINOPHIL NFR BLD AUTO: 1.8 % (ref 0.3–6.2)
ERYTHROCYTE [DISTWIDTH] IN BLOOD BY AUTOMATED COUNT: 13.1 % (ref 12.3–15.4)
GLOBULIN UR ELPH-MCNC: 2.4 GM/DL
GLUCOSE SERPL-MCNC: 150 MG/DL (ref 65–99)
HBA1C MFR BLD: 7.2 % (ref 4.8–5.6)
HCT VFR BLD AUTO: 44.1 % (ref 37.5–51)
HDLC SERPL-MCNC: 42 MG/DL (ref 40–60)
HGB BLD-MCNC: 15.3 G/DL (ref 13–17.7)
IMM GRANULOCYTES # BLD AUTO: 0.01 10*3/MM3 (ref 0–0.05)
IMM GRANULOCYTES NFR BLD AUTO: 0.2 % (ref 0–0.5)
LDLC SERPL CALC-MCNC: 65 MG/DL (ref 0–100)
LDLC/HDLC SERPL: 1.31 {RATIO}
LYMPHOCYTES # BLD AUTO: 1.96 10*3/MM3 (ref 0.7–3.1)
LYMPHOCYTES NFR BLD AUTO: 34.3 % (ref 19.6–45.3)
MCH RBC QN AUTO: 33.6 PG (ref 26.6–33)
MCHC RBC AUTO-ENTMCNC: 34.7 G/DL (ref 31.5–35.7)
MCV RBC AUTO: 96.7 FL (ref 79–97)
MONOCYTES # BLD AUTO: 0.64 10*3/MM3 (ref 0.1–0.9)
MONOCYTES NFR BLD AUTO: 11.2 % (ref 5–12)
NEUTROPHILS NFR BLD AUTO: 2.96 10*3/MM3 (ref 1.7–7)
NEUTROPHILS NFR BLD AUTO: 51.8 % (ref 42.7–76)
NRBC BLD AUTO-RTO: 0 /100 WBC (ref 0–0.2)
PLATELET # BLD AUTO: 207 10*3/MM3 (ref 140–450)
PMV BLD AUTO: 9.6 FL (ref 6–12)
POTASSIUM SERPL-SCNC: 4.7 MMOL/L (ref 3.5–5.2)
PROT SERPL-MCNC: 6.1 G/DL (ref 6–8.5)
RBC # BLD AUTO: 4.56 10*6/MM3 (ref 4.14–5.8)
SODIUM SERPL-SCNC: 139 MMOL/L (ref 136–145)
TRIGL SERPL-MCNC: 244 MG/DL (ref 0–150)
VLDLC SERPL-MCNC: 39 MG/DL (ref 5–40)
WBC NRBC COR # BLD: 5.71 10*3/MM3 (ref 3.4–10.8)

## 2022-09-26 PROCEDURE — 36415 COLL VENOUS BLD VENIPUNCTURE: CPT | Performed by: FAMILY MEDICINE

## 2022-09-26 PROCEDURE — 83036 HEMOGLOBIN GLYCOSYLATED A1C: CPT | Performed by: FAMILY MEDICINE

## 2022-09-26 PROCEDURE — 80053 COMPREHEN METABOLIC PANEL: CPT | Performed by: FAMILY MEDICINE

## 2022-09-26 PROCEDURE — 80061 LIPID PANEL: CPT | Performed by: FAMILY MEDICINE

## 2022-09-26 PROCEDURE — 85025 COMPLETE CBC W/AUTO DIFF WBC: CPT | Performed by: FAMILY MEDICINE

## 2022-09-27 RX ORDER — ICOSAPENT ETHYL 500 MG/1
4 CAPSULE ORAL 2 TIMES DAILY
Qty: 240 CAPSULE | Refills: 11 | Status: SHIPPED | OUTPATIENT
Start: 2022-09-27 | End: 2022-10-26 | Stop reason: ALTCHOICE

## 2022-09-27 NOTE — PROGRESS NOTES
Please inform the patient of the following abnormal results. Elevated triglycerides. Will send in vascepa.

## 2022-10-26 ENCOUNTER — HOSPITAL ENCOUNTER (OUTPATIENT)
Dept: CARDIOLOGY | Facility: HOSPITAL | Age: 82
Discharge: HOME | End: 2022-10-26
Admitting: NURSE PRACTITIONER

## 2022-10-26 ENCOUNTER — OFFICE VISIT (OUTPATIENT)
Dept: CARDIOLOGY | Facility: CLINIC | Age: 82
End: 2022-10-26

## 2022-10-26 VITALS
WEIGHT: 253 LBS | SYSTOLIC BLOOD PRESSURE: 118 MMHG | HEIGHT: 66 IN | DIASTOLIC BLOOD PRESSURE: 82 MMHG | HEART RATE: 91 BPM | BODY MASS INDEX: 40.66 KG/M2

## 2022-10-26 DIAGNOSIS — I48.19 ATRIAL FIBRILLATION, PERSISTENT: Primary | ICD-10-CM

## 2022-10-26 DIAGNOSIS — I42.8 NICM (NONISCHEMIC CARDIOMYOPATHY): ICD-10-CM

## 2022-10-26 DIAGNOSIS — I10 PRIMARY HYPERTENSION: ICD-10-CM

## 2022-10-26 DIAGNOSIS — I50.42 CHRONIC COMBINED SYSTOLIC AND DIASTOLIC CONGESTIVE HEART FAILURE: ICD-10-CM

## 2022-10-26 LAB — DIGOXIN SERPL-MCNC: 0.6 NG/ML (ref 0.6–1.2)

## 2022-10-26 PROCEDURE — 80162 ASSAY OF DIGOXIN TOTAL: CPT | Performed by: NURSE PRACTITIONER

## 2022-10-26 PROCEDURE — 36415 COLL VENOUS BLD VENIPUNCTURE: CPT

## 2022-10-26 PROCEDURE — 99214 OFFICE O/P EST MOD 30 MIN: CPT | Performed by: NURSE PRACTITIONER

## 2022-10-26 PROCEDURE — 93000 ELECTROCARDIOGRAM COMPLETE: CPT | Performed by: NURSE PRACTITIONER

## 2022-10-26 NOTE — PROGRESS NOTES
Date of Office Visit: 10/26/2022  Encounter Provider: Patito Martinez, COLLEEN, APRN  Place of Service: [facility]  Patient Name: Augustine Chen  :1940        Subjective:     Chief Complaint:  Nonischemic cardiomyopathy, persistent atrial fibrillation      History of Present Illness:  Augustine Chen is a 82 y.o. male patient of Dr. Montemayor.  I am seeing this patient in the office today and I reviewed his record    Patient has a history of nonischemic cardiomyopathy, atrial fibrillation, morbid obesity, hyperlipidemia, pulmonary hypertension, hypertension, hypertensive heart disease, type 2 diabetes.     Patient has a history of nonischemic cardiomyopathy with echo 10/2019 showing EF of 23% with severe global hypokinesis, grade 2 diastolic dysfunction, mild to moderately dilated RV cavity with mildly reduced RV systolic function, mild to moderate valvular regurgitation with RVSP of 52 mmHg.  He had heart cath in  showing moderate nonobstructive disease with severe pulmonary hypertension.  He later [place] EF of 30 to 35%.  Subsequent limited follow-up echo [place] showed EF of 36.1% with mild LV enlargement with moderate global LV hypokinesis.  Patient transferred care to Dr. Montemayor 2022.  Heart rate was elevated at that time and Holter showed PVCs occurring 5.58% of the time with one episode of 6 beat run.      Echo 3/2022 showed EF of 28.9% with mild valvular regurgitation and RVSP of 39 mmHg.  Metoprolol XL was subsequently increased.  He was seen in the office 2022 at which point he was strongly opposed to any additional follow-up testing but he did agree to check follow-up echo in August prior to office follow-up with Dr. Montemayor.  His appointment got rescheduled and he ended up canceling his echo.      Patient presents to office today for follow-up appointment.  Patient's wife is with him in the  office today, per patient preference.  Patient reports he is feeling really well since last visit.  He has a blood pressure log with him in the office today showing blood pressure ranging 102/70 to 130/79, most readings 110s over 70s.  He is feeling good at this level.  Heart rate at home staying 70s to 80s.  He denies any chest pain or discomfort, shortness of breath, shortness of breath with exertion, palpitations, racing heartbeat sensation, lower extremity edema, dizziness, syncope, near syncope, falls, fatigue, or abnormal bleeding.  We had a long detailed discussion about his systolic heart failure/cardiomyopathy and recommendations to check follow-up limited echo.  We discussed purpose for this in detail.  However he continues to decline any additional cardiac testing at this time.  He is aware of risk of arrhythmias which could be fatal with low EF but does not believe he would want to pursue any additional treatment or AICD candidacy even if EF not improved.  He denies anginal symptoms and declines any additional stress testing.  He denies palpitations and declines any follow-up monitor studies.  Again, he is aware of potential risks of deciding to forego additional testing.  We did discuss possible financial assistance or referral to have a heart if the finances are an issue however he declines and reports this is not the issue.           Past Medical History:   Diagnosis Date   • Arrhythmia    • Atrial fibrillation (HCC)    • CHF (congestive heart failure) (HCC)    • Diabetes mellitus (HCC)    • Hyperlipidemia    • Hypertension      Past Surgical History:   Procedure Laterality Date   • CARDIAC CATHETERIZATION N/A 10/21/2019    Procedure: Right and Left Heart Cath;  Surgeon: Ashley Walker MD;  Location: [facility] INVASIVE LOCATION;  Service: Cardiovascular   • CARDIAC CATHETERIZATION N/A 10/21/2019    Procedure: Coronary angiography;  Surgeon: Ashley Walker MD;  Location: [facility] INVASIVE  LOCATION;  Service: Cardiovascular   • CARDIAC CATHETERIZATION N/A 10/21/2019    Procedure: Left ventriculography- pressures only- no lv injection;  Surgeon: Ashley Walker MD;  Location: [facility] INVASIVE LOCATION;  Service: Cardiovascular   • CATARACT EXTRACTION WITH INTRAOCULAR LENS IMPLANT       Outpatient Medications Prior to Visit   Medication Sig Dispense Refill   • apixaban (ELIQUIS) 5 MG tablet tablet Take 5 mg by mouth Every 12 (Twelve) Hours.     • digoxin (LANOXIN) 125 MCG tablet Take 1 tablet by mouth Daily. 90 tablet 3   • Empagliflozin (JARDIANCE PO) Take  by mouth.     • Empagliflozin-metFORMIN HCl ER (Synjardy XR)  MG tablet sustained-release 24 hour Take 1 tablet by mouth Daily. 30 tablet 6   • furosemide (LASIX) 40 MG tablet Take 1 tablet by mouth 2 (Two) Times a Day. 180 tablet 1   • glipizide (GLUCOTROL) 10 MG tablet Take 1 tablet by mouth 2 (Two) Times a Day Before Meals. 180 tablet 1   • lisinopril (PRINIVIL,ZESTRIL) 20 MG tablet Take 1 tablet by mouth Daily. 90 tablet 3   • metoprolol succinate XL (TOPROL-XL) 200 MG 24 hr tablet Take 1 tablet by mouth 2 (Two) Times a Day. 180 tablet 0   • omeprazole OTC (PriLOSEC OTC) 20 MG EC tablet Take 20 mg by mouth Daily As Needed.     • potassium chloride (K-DUR,KLOR-CON) 20 MEQ CR tablet Take 1 tablet by mouth Daily. 30 tablet 6   • simvastatin (ZOCOR) 20 MG tablet Take 20 mg by mouth Every Evening.  3   • HYDROcodone-acetaminophen (NORCO) 5-325 MG per tablet 1-2 oral Q6H PRN moderate to severe pain 40 tablet 0   • Icosapent Ethyl (Vascepa) 0.5 g capsule Take 4 capsules by mouth 2 (Two) Times a Day. 240 capsule 11     No facility-administered medications prior to visit.       Allergies as of 10/26/2022 - Reviewed 10/26/2022   Allergen Reaction Noted   • Azithromycin Swelling 10/18/2019     Social History     Socioeconomic History   • Marital status:    Tobacco Use   • Smoking status: Former     Packs/day: 0.00     Years: 0.00      "Pack years: 0.00     Types: Cigarettes     Quit date:      Years since quittin.8   • Smokeless tobacco: Never   • Tobacco comments:     caffeine use - decaf coffee   Vaping Use   • Vaping Use: Never used   Substance and Sexual Activity   • Alcohol use: Yes     Comment: Socially   • Drug use: No   • Sexual activity: Yes     Partners: Female, Male     Family History   Problem Relation Age of Onset   • Heart disease Mother    • Heart attack Mother    • Heart attack Brother        Review of Systems   Constitutional: Negative for malaise/fatigue.   Cardiovascular:        SEE HPI   Respiratory: Negative for shortness of breath.    Hematologic/Lymphatic: Negative for bleeding problem.   Musculoskeletal: Negative for falls.   Gastrointestinal: Negative for melena.   Genitourinary: Negative for hematuria.   Neurological: Negative for dizziness.   Psychiatric/Behavioral: Negative for altered mental status.          Objective:     Vitals:    10/26/22 0818   BP: 118/82   Pulse: 91   Weight: 115 kg (253 lb)   Height: 167.6 cm (66\")     Body mass index is 40.84 kg/m².      PHYSICAL EXAM:  Constitutional:       General: Not in acute distress.     Appearance: Well-developed. Obese. Not diaphoretic.   Eyes:      Pupils: Pupils are equal, round, and reactive to light.   HENT:      Head: Normocephalic and atraumatic.   Pulmonary:      Effort: Pulmonary effort is normal. No respiratory distress.      Breath sounds: Normal breath sounds. No wheezing. No rales.   Cardiovascular:      Normal rate. Irregularly irregular rhythm.      Murmurs: There is no murmur.      No gallop. No click. No rub.   Edema:     Peripheral edema absent.   Abdominal:      General: Bowel sounds are normal.      Palpations: Abdomen is soft.   Musculoskeletal: Normal range of motion.         General: No tenderness or deformity. Skin:     General: Skin is warm and dry.      Findings: No erythema.   Neurological:      Mental Status: Alert and oriented to " person, place, and time.   Psychiatric:         Behavior: Behavior normal.             ECG 12 Lead    Date/Time: 10/26/2022 9:04 AM  Performed by: Patito Martinez DNP, APRN  Authorized by: Patito Martinez DNP, APRN   Comparison: compared with previous ECG from 4/5/2022  Rhythm: atrial fibrillation  BPM: 91  Comments: No significant changes from previous EKGs              Assessment:       Diagnosis Plan   1. Atrial fibrillation, persistent (HCC)  ECG 12 Lead      2. NICM (nonischemic cardiomyopathy) (HCC)  ECG 12 Lead      3. Chronic combined systolic and diastolic congestive heart failure (HCC)        4. Primary hypertension              Plan:     1. Nonischemic cardiomyopathy: EF 28.9% on 3/2022 echo.  On metoprolol XL.  Also on lisinopril and Lasix.  He was supposed to have a follow-up echocardiogram done in August which got rescheduled to today when his appointment got rescheduled.  We discussed rescheduling this however he declines at this time. We had a long detailed discussion about his systolic heart failure/cardiomyopathy and recommendations to check follow-up limited echo.  We discussed purpose for this in detail.  However he continues to decline any additional cardiac testing at this time.  He is aware of risk of arrhythmias which could be fatal with low EF but does not believe he would want to pursue any additional treatment or AICD candidacy even if EF not improved.  He denies anginal symptoms and declines any additional stress testing.  He denies palpitations and declines any follow-up monitor studies.  Again, he is aware of potential risks of deciding to forego additional testing.  2. Persistent atrial fibrillation: On metoprolol XL.  Anticoagulated with Eliquis.  His Eli[facility] is affordable [facility]  Denies falls or abnormal bleeding.  Recent creatinine 1.2 to and recent CBC without anemia.  Rates controlled at home, 70s to 80s.  Recheck digoxin level today, he reports he  has not taken his digoxin yet today.  3. PVCs: Occurred 5.58% of the time on Holter monitor.  Metoprolol XL was subsequently increased.  He is feeling well and declines any additional testing.  4. Hypertension: Blood pressure fairly well controlled in the office today, better controlled at home.  Patient continue to monitor and call for high or low readings  5. Pulmonary hypertension: Mild.  Patient has declined sleep study despite verbalizing understanding of risks of untreated sleep apnea  6. Coronary artery disease: Moderate nonobstructive on 2019 heart cath with EF of 23% on echo at that time.  Denies anginal symptoms and has declined additional testing.  7. Type 2 diabetes: Managed by outside provider.  He reports he is still taking Jardiance in addition to other medications.  8. Morbid obesity    Patient will follow-up with Dr. Montemayor in 6 months.  He denies cardiac symptoms or concerns and declines sooner follow-up.  Discussed in detail signs/symptoms that warrant sooner call or follow-up to the office or ER visit.           Your medication list          Accurate as of October 26, 2022  9:08 AM. If you have any questions, ask your nurse or doctor.            CONTINUE taking these medications      Instructions Last Dose Given Next Dose Due   apixaban 5 MG tablet tablet  Commonly known as: ELIQUIS      Take 5 mg by mouth Every 12 (Twelve) Hours.       digoxin 125 MCG tablet  Commonly known as: LANOXIN      Take 1 tablet by mouth Daily.       furosemide 40 MG tablet  Commonly known as: LASIX      Take 1 tablet by mouth 2 (Two) Times a Day.       glipizide 10 MG tablet  Commonly known as: GLUCOTROL      Take 1 tablet by mouth 2 (Two) Times a Day Before Meals.       JARDIANCE PO      Take  by mouth.       lisinopril 20 MG tablet  Commonly known as: PRINIVIL,ZESTRIL      Take 1 tablet by mouth Daily.       metoprolol succinate  MG 24 hr tablet  Commonly known as: TOPROL-XL      Take 1 tablet by mouth 2  (Two) Times a Day.       omeprazole OTC 20 MG EC tablet  Commonly known as: PriLOSEC OTC      Take 20 mg by mouth Daily As Needed.       potassium chloride 20 MEQ CR tablet  Commonly known as: K-DUR,KLOR-CON      Take 1 tablet by mouth Daily.       simvastatin 20 MG tablet  Commonly known as: ZOCOR      Take 20 mg by mouth Every Evening.       Synjardy XR  MG tablet sustained-release 24 hour  Generic drug: Empagliflozin-metFORMIN HCl ER      Take 1 tablet by mouth Daily.              The above medication changes may not have been made by this provider.  Patient's medication list was updated to reflect medications they are currently taking including medication changes made by other providers.            Thanks,    Patito Martinez, DNP, APRN  10/26/2022         Dictated utilizing Dragon dictation

## 2022-11-17 NOTE — TELEPHONE ENCOUNTER
----- Message from Piotr Montemayor MD sent at 3/20/2022  6:21 PM EDT -----  Please notify patient has heart function has slightly improved compared to prior echo. Advise to increase dose of metoprolol to 150 twice daily. I can call in for 50mg pills to current 100mg pill if that is more convenient   Check BP and HR regularly at home. Let me know if he has any questions     Thank you    Tanya Singh is a 29 year old female presenting for a physical.  Pt has a pap scheduled with her GYN in a few weeks.  Pt hasn't been taking Metformin.  She did not even pick this up.      Last pap: 8/2019    Health Maintenance Due   Topic Date Due   • COVID-19 Vaccine (1) Never done   • DTaP/Tdap/Td Vaccine (7 - Td or Tdap) 09/05/2016   • Cervical Cancer Screening - <30 3 year  08/01/2022   • Influenza Vaccine (1) 09/01/2022       Patient is due for topics as listed above but is not proceeding with Immunization(s) COVID-19, Dtap/Tdap/Td and Influenza, cervical cancer screening at this time.   Denies known Latex allergy or symptoms of Latex sensitivity.    Patient would like communication of their results via:      [employer]    Cell Phone:   Telephone Information:   Mobile [phone]     Okay to leave a message containing results? Yes

## 2022-11-22 DIAGNOSIS — I10 ESSENTIAL HYPERTENSION: ICD-10-CM

## 2022-11-23 RX ORDER — LISINOPRIL 20 MG/1
TABLET ORAL
Qty: 90 TABLET | Refills: 0 | Status: SHIPPED | OUTPATIENT
Start: 2022-11-23 | End: 2023-02-13

## 2023-02-10 DIAGNOSIS — I10 ESSENTIAL HYPERTENSION: ICD-10-CM

## 2023-02-13 RX ORDER — LISINOPRIL 20 MG/1
TABLET ORAL
Qty: 90 TABLET | Refills: 0 | Status: SHIPPED | OUTPATIENT
Start: 2023-02-13

## 2023-03-15 ENCOUNTER — OFFICE VISIT (OUTPATIENT)
Dept: INTERNAL MEDICINE | Facility: CLINIC | Age: 83
End: 2023-03-15
Payer: MEDICARE

## 2023-03-15 VITALS
RESPIRATION RATE: 16 BRPM | DIASTOLIC BLOOD PRESSURE: 78 MMHG | WEIGHT: 255.2 LBS | BODY MASS INDEX: 41.01 KG/M2 | TEMPERATURE: 96.5 F | SYSTOLIC BLOOD PRESSURE: 126 MMHG | OXYGEN SATURATION: 96 % | HEIGHT: 66 IN | HEART RATE: 95 BPM

## 2023-03-15 DIAGNOSIS — E11.59 TYPE 2 DIABETES MELLITUS WITH OTHER CIRCULATORY COMPLICATION, WITHOUT LONG-TERM CURRENT USE OF INSULIN: ICD-10-CM

## 2023-03-15 DIAGNOSIS — E78.2 MIXED HYPERLIPIDEMIA: ICD-10-CM

## 2023-03-15 DIAGNOSIS — E53.8 VITAMIN B12 DEFICIENCY: Primary | ICD-10-CM

## 2023-03-15 DIAGNOSIS — I10 ESSENTIAL HYPERTENSION: ICD-10-CM

## 2023-03-15 PROCEDURE — 3074F SYST BP LT 130 MM HG: CPT | Performed by: FAMILY MEDICINE

## 2023-03-15 PROCEDURE — 3078F DIAST BP <80 MM HG: CPT | Performed by: FAMILY MEDICINE

## 2023-03-15 PROCEDURE — 1159F MED LIST DOCD IN RCRD: CPT | Performed by: FAMILY MEDICINE

## 2023-03-15 PROCEDURE — 99214 OFFICE O/P EST MOD 30 MIN: CPT | Performed by: FAMILY MEDICINE

## 2023-03-15 PROCEDURE — 1160F RVW MEDS BY RX/DR IN RCRD: CPT | Performed by: FAMILY MEDICINE

## 2023-03-15 RX ORDER — GLIPIZIDE 10 MG/1
10 TABLET ORAL
Qty: 180 TABLET | Refills: 1 | Status: SHIPPED | OUTPATIENT
Start: 2023-03-15

## 2023-03-18 NOTE — PROGRESS NOTES
"Chief Complaint  Diabetes (Follow-up)    Subjective        Augustine Chen presents to [facility]  History of Present Illness    Patient is history of vitamin B12 deficiency.  Patient states that he like to get a vitamin B12 levels checked.    Patient also has a history of having type 2 diabetes.  He is currently taking glipizide 10 mg twice a day.  He is also taking Jardiance 25 mg daily.  He denies any side effects of the medication.    Patient also has a history of essential hypertension.  Patient is currently taking metoprolol succinate  mg twice a day along with lisinopril 20 mg daily.  Denies any side effects of the medication.    Patient has hyperlipidemia.  Patient currently on simvastatin 20 mg daily.  Patient denies any side effects of the medication.    Objective   Vital Signs:  /78   Pulse 95   Temp 96.5 °F (35.8 °C)   Resp 16   Ht 167.6 cm (66\")   Wt 116 kg (255 lb 3.2 oz)   SpO2 96%   BMI 41.19 kg/m²   Estimated body mass index is 41.19 kg/m² as calculated from the following:    Height as of this encounter: 167.6 cm (66\").    Weight as of this encounter: 116 kg (255 lb 3.2 oz).             Physical Exam  Vitals and nursing note reviewed.   Constitutional:       Appearance: He is well-developed.   HENT:      Head: Normocephalic and atraumatic.   Musculoskeletal:      Cervical back: Normal range of motion and neck supple.   Neurological:      Mental Status: He is alert and oriented to person, place, and time.   Psychiatric:         Behavior: Behavior normal.        Result Review :                   Assessment and Plan   Diagnoses and all orders for this visit:    1. Vitamin B12 deficiency (Primary)  -     CBC & Differential; Future  -     Methylmalonic Acid, Serum; Future  -     Vitamin B12; Future    2. Type 2 diabetes mellitus with other circulatory complication, without long-term current use of insulin (HCC)  -     glipizide (GLUCOTROL) 10 MG tablet; " Take 1 tablet by mouth 2 (Two) Times a Day Before Meals.  Dispense: 180 tablet; Refill: 1  -     Hemoglobin A1c; Future  -     Microalbumin / Creatinine Urine Ratio - Urine, Clean Catch; Future  -     Comprehensive Metabolic Panel; Future  -     empagliflozin (Jardiance) 25 MG tablet tablet; Take 1 tablet by mouth Daily.  Dispense: 30 tablet; Refill: 11    3. Essential hypertension  -     CBC & Differential; Future  -     Comprehensive Metabolic Panel; Future    4. Mixed hyperlipidemia  -     Lipid Panel With LDL / HDL Ratio; Future    For patient's hyperlipidemia, continue simvastatin 20 mg daily.  For patient's essential hypertension, we will continue him on metoprolol succinate XL along with lisinopril.  For patient's type 2 diabetes, continue glipizide and Jardiance.  For his vitamin B12 deficiency we will check several labs at today's visit.         Follow Up   No follow-ups on file.  Patient was given instructions and counseling regarding his condition or for health maintenance advice. Please see specific information pulled into the AVS if appropriate.

## 2023-03-21 DIAGNOSIS — E11.59 TYPE 2 DIABETES MELLITUS WITH OTHER CIRCULATORY COMPLICATION, WITHOUT LONG-TERM CURRENT USE OF INSULIN: Primary | ICD-10-CM

## 2023-03-22 ENCOUNTER — LAB (OUTPATIENT)
Dept: LAB | Facility: HOSPITAL | Age: 83
End: 2023-03-22
Payer: MEDICARE

## 2023-03-22 DIAGNOSIS — E11.59 TYPE 2 DIABETES MELLITUS WITH OTHER CIRCULATORY COMPLICATION, WITHOUT LONG-TERM CURRENT USE OF INSULIN: ICD-10-CM

## 2023-03-22 LAB
ALBUMIN SERPL-MCNC: 3.8 G/DL (ref 3.5–5.2)
ALBUMIN/GLOB SERPL: 1.4 G/DL
ALP SERPL-CCNC: 45 U/L (ref 39–117)
ALT SERPL W P-5'-P-CCNC: 32 U/L (ref 1–41)
ANION GAP SERPL CALCULATED.3IONS-SCNC: 11.2 MMOL/L (ref 5–15)
AST SERPL-CCNC: 23 U/L (ref 1–40)
BILIRUB SERPL-MCNC: 0.4 MG/DL (ref 0–1.2)
BUN SERPL-MCNC: 14 MG/DL (ref 8–23)
BUN/CREAT SERPL: 15.9 (ref 7–25)
CALCIUM SPEC-SCNC: 9.6 MG/DL (ref 8.6–10.5)
CHLORIDE SERPL-SCNC: 101 MMOL/L (ref 98–107)
CO2 SERPL-SCNC: 23.8 MMOL/L (ref 22–29)
CREAT SERPL-MCNC: 0.88 MG/DL (ref 0.76–1.27)
EGFRCR SERPLBLD CKD-EPI 2021: 85.9 ML/MIN/1.73
GLOBULIN UR ELPH-MCNC: 2.7 GM/DL
GLUCOSE SERPL-MCNC: 202 MG/DL (ref 65–99)
HBA1C MFR BLD: 8.2 % (ref 4.8–5.6)
POTASSIUM SERPL-SCNC: 4.4 MMOL/L (ref 3.5–5.2)
PROT SERPL-MCNC: 6.5 G/DL (ref 6–8.5)
SODIUM SERPL-SCNC: 136 MMOL/L (ref 136–145)

## 2023-03-22 PROCEDURE — 83036 HEMOGLOBIN GLYCOSYLATED A1C: CPT

## 2023-03-22 PROCEDURE — 80053 COMPREHEN METABOLIC PANEL: CPT

## 2023-03-22 PROCEDURE — 36415 COLL VENOUS BLD VENIPUNCTURE: CPT

## 2023-03-25 NOTE — PROGRESS NOTES
Please inform the patient of the following abnormal results. Worsening hba1c, needs to continue medication. Patient would benefit from medication like ozempic. If he would like it, we can send that in.

## 2023-03-28 DIAGNOSIS — E11.59 TYPE 2 DIABETES MELLITUS WITH OTHER CIRCULATORY COMPLICATION, WITHOUT LONG-TERM CURRENT USE OF INSULIN: Primary | ICD-10-CM

## 2023-04-03 DIAGNOSIS — E11.59 TYPE 2 DIABETES MELLITUS WITH OTHER CIRCULATORY COMPLICATION, WITHOUT LONG-TERM CURRENT USE OF INSULIN: ICD-10-CM

## 2023-04-11 RX ORDER — FUROSEMIDE 40 MG/1
TABLET ORAL
Qty: 180 TABLET | Refills: 1 | Status: SHIPPED | OUTPATIENT
Start: 2023-04-11

## 2023-04-28 ENCOUNTER — HOSPITAL ENCOUNTER (OUTPATIENT)
Dept: CARDIOLOGY | Facility: HOSPITAL | Age: 83
Discharge: HOME | End: 2023-04-28
Payer: MEDICARE

## 2023-04-28 ENCOUNTER — OFFICE VISIT (OUTPATIENT)
Dept: CARDIOLOGY | Facility: CLINIC | Age: 83
End: 2023-04-28
Payer: MEDICARE

## 2023-04-28 VITALS
HEART RATE: 94 BPM | BODY MASS INDEX: 41.14 KG/M2 | DIASTOLIC BLOOD PRESSURE: 78 MMHG | HEIGHT: 66 IN | SYSTOLIC BLOOD PRESSURE: 110 MMHG | WEIGHT: 256 LBS

## 2023-04-28 DIAGNOSIS — I10 PRIMARY HYPERTENSION: ICD-10-CM

## 2023-04-28 DIAGNOSIS — I50.42 CHRONIC COMBINED SYSTOLIC AND DIASTOLIC CONGESTIVE HEART FAILURE: Primary | ICD-10-CM

## 2023-04-28 DIAGNOSIS — I42.8 NICM (NONISCHEMIC CARDIOMYOPATHY): ICD-10-CM

## 2023-04-28 DIAGNOSIS — I48.19 ATRIAL FIBRILLATION, PERSISTENT: ICD-10-CM

## 2023-04-28 DIAGNOSIS — Z79.01 CHRONIC ANTICOAGULATION: ICD-10-CM

## 2023-04-28 LAB — DIGOXIN SERPL-MCNC: 0.6 NG/ML (ref 0.6–1.2)

## 2023-04-28 PROCEDURE — 80162 ASSAY OF DIGOXIN TOTAL: CPT | Performed by: INTERNAL MEDICINE

## 2023-04-28 PROCEDURE — 36415 COLL VENOUS BLD VENIPUNCTURE: CPT

## 2023-04-28 NOTE — PROGRESS NOTES
PATIENTINFORMATION    Date of Office Visit: 2023  Encounter Provider: Piotr Montemayor MD  Place of Service: [facility] CARDIOLOGY  Patient Name: Augustine Chen  : 1940    Subjective:     Encounter Date:2023      Patient ID: Augustine Chen is a 82 y.o. male.    No chief complaint on file.    HPI  Mr. Chne is a pleasant 82 years old gentleman who came to cardiac clinic for follow-up visit.  He denies any chest pain, significant shortness of breath, orthopnea, PND, palpitations, presyncope syncope, significant extremity swelling.  He is compliant with current medications and denies any significant side effects.  He walks at least 20 minutes daily without significant symptoms.  No ER visit or hospitalization since last clinic visit.      ROS  All systems reviewed and negative except as noted in HPI.    Past Medical History:   Diagnosis Date   • Arrhythmia    • Atrial fibrillation    • CHF (congestive heart failure)    • Diabetes mellitus    • Hyperlipidemia    • Hypertension        Past Surgical History:   Procedure Laterality Date   • CARDIAC CATHETERIZATION N/A 10/21/2019    Procedure: Right and Left Heart Cath;  Surgeon: Ashley Walker MD;  Location: [facility] CATH INVASIVE LOCATION;  Service: Cardiovascular   • CARDIAC CATHETERIZATION N/A 10/21/2019    Procedure: Coronary angiography;  Surgeon: Ashley Walker MD;  Location: [facility] CATH INVASIVE LOCATION;  Service: Cardiovascular   • CARDIAC CATHETERIZATION N/A 10/21/2019    Procedure: Left ventriculography- pressures only- no lv injection;  Surgeon: Ashley Walker MD;  Location: [facility] CATH INVASIVE LOCATION;  Service: Cardiovascular   • CATARACT EXTRACTION WITH INTRAOCULAR LENS IMPLANT         Social History     Socioeconomic History   • Marital status:    Tobacco Use   • Smoking status: Former     Packs/day: 0.00     Years: 0.00     Pack years: 0.00     Types: Cigarettes     Quit date:      Years since  "quittin.3   • Smokeless tobacco: Never   • Tobacco comments:     caffeine use - decaf coffee   Vaping Use   • Vaping Use: Never used   Substance and Sexual Activity   • Alcohol use: Yes     Comment: Socially   • Drug use: No   • Sexual activity: Yes     Partners: Female, Male       Family History   Problem Relation Age of Onset   • Heart disease Mother    • Heart attack Mother    • Heart attack Brother            ECG 12 Lead    Date/Time: 2023 8:39 AM  Performed by: Piotr Montemayor MD  Authorized by: Piotr Montemayor MD   Comparison: compared with previous ECG from 10/26/2022  Similar to previous ECG  Rhythm: atrial fibrillation  Rate: normal  Conduction: conduction normal  ST Segments: ST segments normal  T Waves: T waves normal  QRS axis: normal  Other findings: low voltage    Clinical impression: abnormal EKG               Objective:     /78   Pulse 94   Ht 167.6 cm (66\")   Wt 116 kg (256 lb)   BMI 41.32 kg/m²  Body mass index is 41.32 kg/m².     Constitutional:       General: Not in acute distress.     Appearance: Well-developed. Not diaphoretic.   Eyes:      Pupils: Pupils are equal, round, and reactive to light.   HENT:      Head: Normocephalic and atraumatic.   Neck:      Thyroid: No thyromegaly.   Pulmonary:      Effort: Pulmonary effort is normal. No respiratory distress.      Breath sounds: Normal breath sounds. No wheezing. No rales.   Chest:      Chest wall: Not tender to palpatation.   Cardiovascular:      Normal rate. Irregularly irregular rhythm.      No gallop.   Pulses:     Intact distal pulses.   Edema:     Peripheral edema absent.   Abdominal:      General: Bowel sounds are normal. There is no distension.      Palpations: Abdomen is soft.      Tenderness: There is no guarding.   Musculoskeletal: Normal range of motion.         General: No deformity.      Cervical back: Normal range of motion and neck supple. Skin:     General: Skin is warm and dry.      Findings: No " rash.   Neurological:      Mental Status: Alert and oriented to person, place, and time.      Cranial Nerves: No cranial nerve deficit.      Deep Tendon Reflexes: Reflexes are normal and symmetric.   Psychiatric:         Judgment: Judgment normal.         Review Of Data: I have reviewed pertinent recent labs, images and documents and pertinent findings included in HPI or assessment below.    Lipid Panel        9/26/2022    07:23 3/17/2023    07:40   Lipid Panel   Total Cholesterol 146      Total Cholesterol  188     Triglycerides 244   156     HDL Cholesterol 42   46     VLDL Cholesterol 39   28     LDL Cholesterol  65   114     LDL/HDL Ratio 1.31   2.41         Assessment/Plan:         1. Nonischemic cardiomyopathy: EF of 29% on echo done in 3/2022.  Coronary angiogram in 2019 with nonobstructive coronary artery disease-EF on ventriculogram was 23%.  2. Persistent atrial fibrillation on chronic anticoagulation with Eliquis and metoprolol XL.  3. Frequent PVCs per prior Holter's: 5.6%.  4. Essential hypertension: Fairly controlled  5. Pulmonary hypertension  6. Type 2 diabetes mellitus  7. Morbidly obese    Mr. Chen is doing fairly well from cardiology standpoint.  No evidence for volume overload or decompensation.  Reviewed most recent labs other than elevated A1c seems to be reasonable.  There is plan to treat with Ozempic.  Continue current care with daily Lasix, lisinopril, digoxin and extended release metoprolol.  He is still hesitant about repeat echo because of cost.  [facility]  Check digoxin level    Otherwise follow-up in cardiology clinic in 6 months or sooner with any concerning symptoms.  Diagnosis and plan of care discussed with patient and verbalized understanding.            Your medication list          Accurate as of April 28, 2023  8:53 AM. If you have any questions, ask your nurse or doctor.            CONTINUE taking these medications       Instructions Last Dose Given Next Dose Due   apixaban 5 MG tablet tablet  Commonly known as: ELIQUIS      Take 1 tablet by mouth Every 12 (Twelve) Hours.       digoxin 125 MCG tablet  Commonly known as: LANOXIN      Take 1 tablet by mouth Daily.       empagliflozin 25 MG tablet tablet  Commonly known as: Jardiance      Take 1 tablet by mouth Daily.       furosemide 40 MG tablet  Commonly known as: LASIX      Take 1 tablet by mouth twice daily       glipizide 10 MG tablet  Commonly known as: GLUCOTROL      Take 1 tablet by mouth 2 (Two) Times a Day Before Meals.       lisinopril 20 MG tablet  Commonly known as: PRINIVIL,ZESTRIL      Take 1 tablet by mouth once daily       metoprolol succinate  MG 24 hr tablet  Commonly known as: TOPROL-XL      Take 1 tablet by mouth 2 (Two) Times a Day.       omeprazole OTC 20 MG EC tablet  Commonly known as: PriLOSEC OTC      Take 1 tablet by mouth Daily As Needed.       potassium chloride 20 MEQ CR tablet  Commonly known as: K-DUR,KLOR-CON      Take 1 tablet by mouth Daily.       Semaglutide(0.25 or 0.5MG/DOS) 2 MG/1.5ML solution pen-injector  Commonly known as: OZEMPIC      Inject 0.25 mg under the skin into the appropriate area as directed 1 (One) Time Per Week. Inject 0.25mg under the skin into the appropriate area as directed 1 (one) time per week, then 0.5 weekly afterwards.       simvastatin 20 MG tablet  Commonly known as: ZOCOR      Take 1 tablet by mouth Every Evening.                  Piotr Montemayor MD  04/28/23  08:53 EDT

## 2023-05-04 DIAGNOSIS — I10 ESSENTIAL HYPERTENSION: ICD-10-CM

## 2023-05-04 RX ORDER — LISINOPRIL 20 MG/1
TABLET ORAL
Qty: 90 TABLET | Refills: 0 | Status: SHIPPED | OUTPATIENT
Start: 2023-05-04

## 2023-07-28 DIAGNOSIS — I10 ESSENTIAL HYPERTENSION: ICD-10-CM

## 2023-07-28 RX ORDER — LISINOPRIL 20 MG/1
TABLET ORAL
Qty: 90 TABLET | Refills: 0 | Status: SHIPPED | OUTPATIENT
Start: 2023-07-28

## 2023-08-11 ENCOUNTER — DOCUMENTATION (OUTPATIENT)
Dept: INTERNAL MEDICINE | Facility: CLINIC | Age: 83
End: 2023-08-11
Payer: MEDICARE

## 2023-08-11 RX ORDER — ATORVASTATIN CALCIUM 10 MG/1
10 TABLET, FILM COATED ORAL DAILY
COMMUNITY

## 2023-08-11 RX ORDER — ALOGLIPTIN 25 MG/1
25 TABLET, FILM COATED ORAL
COMMUNITY

## 2023-08-25 NOTE — PROGRESS NOTES
I reviewed report of [facility] done [facility].  Mild global left ventricular hypokinesis reported with left ventricular ejection fraction of 43%.  Grade 2 diastolic dysfunction reported.  Moderate left atrial enlargement, mild mitral valve regurgitation mild aortic valve stenosis

## 2023-08-28 ENCOUNTER — TELEPHONE (OUTPATIENT)
Dept: CARDIOLOGY | Facility: CLINIC | Age: 83
End: 2023-08-28
Payer: MEDICARE

## 2023-08-28 NOTE — TELEPHONE ENCOUNTER
----- Message from Piotr Montemayor MD sent at 8/25/2023  5:40 PM EDT -----  Can you please call and notify Mr. Da Silva that I reviewed his echocardiogram: His heart function has improved compared to prior echo almost getting back to normal.  Please advised to continue current care.  Let me know if he has any questions.  Thank you

## 2023-09-13 ENCOUNTER — OFFICE VISIT (OUTPATIENT)
Dept: INTERNAL MEDICINE | Facility: CLINIC | Age: 83
End: 2023-09-13
Payer: MEDICARE

## 2023-09-13 VITALS
HEART RATE: 71 BPM | RESPIRATION RATE: 16 BRPM | HEIGHT: 66 IN | WEIGHT: 253 LBS | SYSTOLIC BLOOD PRESSURE: 106 MMHG | OXYGEN SATURATION: 95 % | DIASTOLIC BLOOD PRESSURE: 78 MMHG | BODY MASS INDEX: 40.66 KG/M2

## 2023-09-13 DIAGNOSIS — Z00.00 VISIT FOR WELL MAN HEALTH CHECK: Primary | ICD-10-CM

## 2023-09-13 DIAGNOSIS — E11.59 TYPE 2 DIABETES MELLITUS WITH OTHER CIRCULATORY COMPLICATION, WITHOUT LONG-TERM CURRENT USE OF INSULIN: ICD-10-CM

## 2023-09-13 DIAGNOSIS — E53.8 VITAMIN B12 DEFICIENCY: ICD-10-CM

## 2023-09-13 DIAGNOSIS — R32 URINARY INCONTINENCE, UNSPECIFIED TYPE: ICD-10-CM

## 2023-09-13 DIAGNOSIS — E78.2 MIXED HYPERLIPIDEMIA: ICD-10-CM

## 2023-09-13 DIAGNOSIS — Z00.00 HEALTHCARE MAINTENANCE: ICD-10-CM

## 2023-09-13 DIAGNOSIS — Z00.00 MEDICARE ANNUAL WELLNESS VISIT, SUBSEQUENT: ICD-10-CM

## 2023-09-13 DIAGNOSIS — I10 ESSENTIAL HYPERTENSION: ICD-10-CM

## 2023-09-13 RX ORDER — CYANOCOBALAMIN 1000 UG/ML
1000 INJECTION, SOLUTION INTRAMUSCULAR; SUBCUTANEOUS
Status: DISCONTINUED | OUTPATIENT
Start: 2023-09-13 | End: 2023-09-13

## 2023-09-13 NOTE — PROGRESS NOTES
Moise Chen is a 83 y.o. male and is here for a comprehensive physical exam. The patient reports no problems.            Social History:   Social History     Socioeconomic History    Marital status:    Tobacco Use    Smoking status: Former     Packs/day: 0.00     Years: 0.00     Pack years: 0.00     Types: Cigarettes     Quit date: 1982     Years since quittin.7    Smokeless tobacco: Never    Tobacco comments:     caffeine use - decaf coffee   Vaping Use    Vaping Use: Never used   Substance and Sexual Activity    Alcohol use: Yes     Comment: Socially    Drug use: Never    Sexual activity: Yes     Partners: Female, Male       Family History:   Family History   Problem Relation Age of Onset    Heart disease Mother     Heart attack Mother     Heart attack Brother        Past Medical History:   Past Medical History:   Diagnosis Date    Arrhythmia     Atrial fibrillation     Benign prostatic hyperplasia     Cataract     25 years ago    CHF (congestive heart failure)     Diabetes mellitus     Hyperlipidemia     Hypertension        The following portions of the patient's history were reviewed and updated as appropriate: allergies, current medications, past family history, past medical history, past social history, past surgical history and problem list.    Review of Systems    Review of Systems   Constitutional:  Negative for chills and fever.   HENT:  Negative for congestion, rhinorrhea, sinus pain and sore throat.    Eyes:  Negative for photophobia and visual disturbance.   Respiratory:  Negative for cough, chest tightness and shortness of breath.    Cardiovascular:  Negative for chest pain and palpitations.   Gastrointestinal:  Negative for diarrhea, nausea and vomiting.   Genitourinary:  Negative for dysuria, frequency and urgency.   Skin:  Negative for rash and wound.   Neurological:  Negative for dizziness and syncope.   Psychiatric/Behavioral:  Negative for behavioral problems and  confusion.      Objective   Physical Exam  Vitals and nursing note reviewed.   Constitutional:       Appearance: He is well-developed.   HENT:      Head: Normocephalic and atraumatic.      Right Ear: External ear normal.      Left Ear: External ear normal.   Cardiovascular:      Rate and Rhythm: Normal rate and regular rhythm.      Heart sounds: Normal heart sounds.   Pulmonary:      Effort: Pulmonary effort is normal. No respiratory distress.      Breath sounds: Normal breath sounds.   Abdominal:      Palpations: Abdomen is soft.      Tenderness: There is no abdominal tenderness. There is no guarding.   Musculoskeletal:         General: Normal range of motion.      Cervical back: Normal range of motion and neck supple.   Lymphadenopathy:      Cervical: No cervical adenopathy.   Skin:     General: Skin is warm.   Neurological:      Mental Status: He is alert and oriented to person, place, and time.   Psychiatric:         Behavior: Behavior normal.       Vitals:    09/13/23 0733   BP: 106/78   Pulse: 71   Resp: 16   SpO2: 95%     Body mass index is 40.84 kg/m².    Medications:   Current Outpatient Medications:     Alogliptin Benzoate 25 MG tablet, Take 1 tablet by mouth. [facility], Disp: , Rfl:     apixaban (ELIQUIS) 5 MG tablet tablet, Take 1 tablet by mouth Every 12 (Twelve) Hours., Disp: , Rfl:     atorvastatin (LIPITOR) 10 MG tablet, Take 1 tablet by mouth Daily. [facility], Disp: , Rfl:     digoxin (LANOXIN) 125 MCG tablet, Take 1 tablet by mouth Daily., Disp: 90 tablet, Rfl: 3    empagliflozin (Jardiance) 25 MG tablet tablet, Take 1 tablet by mouth Daily., Disp: 30 tablet, Rfl: 11    furosemide (LASIX) 40 MG tablet, Take 1 tablet by mouth twice daily, Disp: 180 tablet, Rfl: 1    glipizide (GLUCOTROL) 10 MG tablet, Take 1 tablet by mouth 2 (Two) Times a Day Before Meals., Disp: 180 tablet, Rfl: 1    lisinopril (PRINIVIL,ZESTRIL) 20 MG tablet, Take 1 tablet by mouth once daily, Disp: 90 tablet, Rfl:  0    Mirabegron ER (Myrbetriq) 25 MG tablet sustained-release 24 hour 24 hr tablet, Take 1 tablet by mouth Daily., Disp: 30 tablet, Rfl: 6    omeprazole OTC (PriLOSEC OTC) 20 MG EC tablet, Take 1 tablet by mouth Daily As Needed., Disp: , Rfl:     potassium chloride (K-DUR,KLOR-CON) 20 MEQ CR tablet, Take 1 tablet by mouth Daily., Disp: 30 tablet, Rfl: 6    Semaglutide,0.25 or 0.5MG/DOS, (OZEMPIC) 2 MG/1.5ML solution pen-injector, Inject 0.25 mg under the skin into the appropriate area as directed 1 (One) Time Per Week. Inject 0.25mg under the skin into the appropriate area as directed 1 (one) time per week, then 0.5 weekly afterwards., Disp: 1.5 mL, Rfl: 2    simvastatin (ZOCOR) 20 MG tablet, Take 1 tablet by mouth Every Evening., Disp: , Rfl: 3    metoprolol succinate XL (TOPROL-XL) 200 MG 24 hr tablet, Take 1 tablet by mouth 2 (Two) Times a Day., Disp: 180 tablet, Rfl: 0  No current facility-administered medications for this visit.       Assessment & Plan   Healthy male exam.      1. Healthcare Maintenance:  2. Patient Counseling:  --Nutrition: Stressed importance of moderation in sodium/caffeine intake, saturated fat and cholesterol, caloric balance, sufficient intake of fresh fruits, vegetables, fiber, calcium and vit D  --Exercise: Recommended 30 minutes of exercise daily.   --Immunizations reviewed.  --Discussed benefits of screening colonoscopy.    Diagnoses and all orders for this visit:    Visit for [facility] health check    Medicare annual wellness visit, subsequent    Healthcare maintenance  -     CBC & Differential; Future  -     Comprehensive Metabolic Panel; Future  -     Hemoglobin A1c; Future  -     Thyroid Panel With TSH; Future  -     Lipid Panel With LDL / HDL Ratio; Future            No follow-ups on file.           Dictated utilizing Dragon Voice Recognition Software

## 2023-09-13 NOTE — PROGRESS NOTES
"Chief Complaint  Medicare Wellness-subsequent    Subjective        Augustine Chen presents to [facility]  History of Present Illness    Patient presents today's office visit with a history having hyperlipidemia.  Currently on simvastatin 20 mg daily.  Patient denies any side effects of the medicine.    Patient also has a history of having essential hypertension.  Currently on lisinopril 20 mg daily he is also on metoprolol succinate  mg bid.  He denies any side effects of medication.  Blood pressure is 106/78.  He denies any side effects of the medicine.    Patient history having vitamin B12 deficiency.  Takes vitamin B12 pills at home.    He notes that he does have some trouble with his urinary incontinence.  Patient states that he cannot make it to the bathroom in time and he does have some leakage on the floor.  He is taking Lasix as well for his heart failure and his heart failure seems to have improved, with now an ejection fraction of 40.  [facility]    Patient does have type 2 diabetes.  Currently on alogliptin along with glipizide 10 mg twice a day along with Jardiance 25 mg daily.  Denies any side effects of the medicine.    Objective   Vital Signs:  /78 (BP Location: Left arm, Patient Position: Sitting, Cuff Size: Large Adult)   Pulse 71   Resp 16   Ht 167.6 cm (66\")   Wt 115 kg (253 lb)   SpO2 95%   BMI 40.84 kg/m²   Estimated body mass index is 40.84 kg/m² as calculated from the following:    Height as of this encounter: 167.6 cm (66\").    Weight as of this encounter: 115 kg (253 lb).             Physical Exam  Vitals and nursing note reviewed.   Constitutional:       Appearance: He is well-developed.   HENT:      Head: Normocephalic and atraumatic.   Musculoskeletal:      Cervical back: Normal range of motion and neck supple.   Neurological:      Mental Status: He is alert and oriented to person, place, and time. "   Psychiatric:         Behavior: Behavior normal.      Result Review :                   Assessment and Plan   Diagnoses and all orders for this visit:            Vitamin B12 deficiency  -     cyanocobalamin injection 1,000 mcg    Urinary incontinence, unspecified type  -     Discontinue: Mirabegron ER (Myrbetriq) 25 MG tablet sustained-release 24 hour 24 hr tablet; Take 1 tablet by mouth Daily.  Dispense: 30 tablet; Refill: 6  -     Mirabegron ER (Myrbetriq) 25 MG tablet sustained-release 24 hour 24 hr tablet; Take 1 tablet by mouth Daily.  Dispense: 30 tablet; Refill: 6    Essential hypertension  -     CBC & Differential  -     Comprehensive Metabolic Panel    Type 2 diabetes mellitus with other circulatory complication, without long-term current use of insulin  -     Hemoglobin A1c  -     Thyroid Panel With TSH  -     Microalbumin / Creatinine Urine Ratio - Urine, Clean Catch    Mixed hyperlipidemia  -     Comprehensive Metabolic Panel  -     Lipid Panel With LDL / HDL Ratio      I discussed with him that for the urinary continence he may benefit from trying Myrbetriq.  He can still continue to take the Lasix.  If it causes him problems he will let us know.  For essential hypertension, continue lisinopril and metoprolol succinate.  For type 2 diabetes continue alogliptin, Jardiance, and glipizide.  For the hyperlipidemia, continue simvastatin.       Follow Up   No follow-ups on file.  Patient was given instructions and counseling regarding his condition or for health maintenance advice. Please see specific information pulled into the AV[facility] if appropriate.

## 2023-09-13 NOTE — PROGRESS NOTES
The ABCs of the Annual Wellness Visit  Subsequent Medicare Wellness Visit    Moise Chen is a 83 y.o. male who presents for a Subsequent Medicare Wellness Visit.    The following portions of the patient's history were reviewed and   updated as appropriate: allergies, current medications, past family history, past medical history, past social history, past surgical history, and problem list.    Compared to one year ago, the patient feels his physical   health is the same.    Compared to one year ago, the patient feels his mental   health is the same.    Recent Hospitalizations:  He was not admitted to the hospital during the last year.       Current Medical Providers:  Patient Care Team:  Thony Mclean MD as PCP - General (Family Medicine)    Outpatient Medications Prior to Visit   Medication Sig Dispense Refill    Alogliptin Benzoate 25 MG tablet Take 1 tablet by [facility]      apixaban (ELIQUIS) 5 MG tablet tablet Take 1 tablet by mouth Every 12 (Twelve) Hours.      atorvastatin (LIPITOR) 10 MG tablet Take 1 tablet by mouth Daily. [facility]      digoxin (LANOXIN) 125 MCG tablet Take 1 tablet by mouth Daily. 90 tablet 3    empagliflozin (Jardiance) 25 MG tablet tablet Take 1 tablet by mouth Daily. 30 tablet 11    furosemide (LASIX) 40 MG tablet Take 1 tablet by mouth twice daily 180 tablet 1    glipizide (GLUCOTROL) 10 MG tablet Take 1 tablet by mouth 2 (Two) Times a Day Before Meals. 180 tablet 1    lisinopril (PRINIVIL,ZESTRIL) 20 MG tablet Take 1 tablet by mouth once daily 90 tablet 0    omeprazole OTC (PriLOSEC OTC) 20 MG EC tablet Take 1 tablet by mouth Daily As Needed.      potassium chloride (K-DUR,KLOR-CON) 20 MEQ CR tablet Take 1 tablet by mouth Daily. 30 tablet 6    Semaglutide,0.25 or 0.5MG/DOS, (OZEMPIC) 2 MG/1.5ML solution pen-injector Inject 0.25 mg under the skin into the appropriate area as directed 1 (One) Time Per Week. Inject 0.25mg under the skin into the  "appropriate area as directed 1 (one) time per week, then 0.5 weekly afterwards. 1.5 mL 2    simvastatin (ZOCOR) 20 MG tablet Take 1 tablet by mouth Every Evening.  3    metoprolol succinate XL (TOPROL-XL) 200 MG 24 hr tablet Take 1 tablet by mouth 2 (Two) Times a Day. 180 tablet 0     No facility-administered medications prior to visit.       No opioid medication identified on active medication list. I have reviewed chart for other potential  high risk medication/s and harmful drug interactions in the elderly.        Aspirin is not on active medication list.  Aspirin use is not indicated based on review of current medical condition/s. Risk of harm outweighs potential benefits.  .    Patient Active Problem List   Diagnosis    Chronic combined systolic and diastolic congestive heart failure    Prediabetes    NSVT (nonsustained ventricular tachycardia)    HTN (hypertension)    Hypercholesterolemia    Type 2 diabetes mellitus with circulatory disorder    Pulmonary hypertension    NICM (nonischemic cardiomyopathy)    Atrial fibrillation, persistent    Chronic anticoagulation    Severe obesity (BMI 35.0-39.9) with comorbidity     Advance Care Planning   Advance Care Planning     Advance Directive is not on file.  ACP discussion was held with the patient during this visit. Patient has an advance directive (not in EMR), copy requested.     Objective    Vitals:    09/13/23 0733   BP: 106/78   BP Location: Left arm   Patient Position: Sitting   Cuff Size: Large Adult   Pulse: 71   Resp: 16   SpO2: 95%   Weight: 115 kg (253 lb)   Height: 167.6 cm (66\")   PainSc: 0-No pain     Estimated body mass index is 40.84 kg/m² as calculated from the following:    Height as of this encounter: 167.6 cm (66\").    Weight as of this encounter: 115 kg (253 lb).    Class 3 Severe Obesity (BMI >=40). Obesity-related health conditions include the following: diabetes mellitus and dyslipidemias. Obesity is unchanged. BMI is is above average; BMI " management plan is completed. We discussed portion control and increasing exercise.      Does the patient have evidence of cognitive impairment?   No            HEALTH RISK ASSESSMENT    Smoking Status:  Social History     Tobacco Use   Smoking Status Former    Packs/day: 0.00    Years: 0.00    Pack years: 0.00    Types: Cigarettes    Quit date: 1982    Years since quittin.7   Smokeless Tobacco Never   Tobacco Comments    caffeine use - decaf coffee     Alcohol Consumption:  Social History     Substance and Sexual Activity   Alcohol Use Yes    Comment: Socially     Fall Risk Screen:    LIYAADI Fall Risk Assessment has not been completed.    Depression Screening:       No data to display                Health Habits and Functional and Cognitive Screenin/13/2023     7:00 AM   Functional & Cognitive Status   Do you have difficulty preparing food and eating? No   Do you have difficulty bathing yourself, getting dressed or grooming yourself? No   Do you have difficulty using the toilet? No   Do you have difficulty moving around from place to place? No   Do you have trouble with steps or getting out of a bed or a chair? No   Current Diet Limited Junk Food   Dental Exam Up to date   Eye Exam Up to date   Exercise (times per week) 0 times per week   Current Exercises Include No Regular Exercise   Do you need help using the phone?  No   Are you deaf or do you have serious difficulty hearing?  Yes   Do you need help to go to places out of walking distance? No   Do you need help shopping? No   Do you need help preparing meals?  No   Do you need help with housework?  No   Do you need help with laundry? No   Do you need help taking your medications? No   Do you need help managing money? No   Do you ever drive or ride in a car without wearing a seat belt? No   Have you felt unusual stress, anger or loneliness in the last month? No   Who do you live with? Spouse   If you need help, do you have trouble finding  someone available to you? No   Have you been bothered in the last four weeks by sexual problems? No   Do you have difficulty concentrating, remembering or making decisions? No       Age-appropriate Screening Schedule:  Refer to the list below for future screening recommendations based on patient's age, sex and/or medical conditions. Orders for these recommended tests are listed in the plan section. The patient has been provided with a written plan.    Health Maintenance   Topic Date Due    BMI FOLLOWUP  Never done    ZOSTER VACCINE (1 of 2) Never done    Hepatitis B (1 of 3 - Risk 3-dose series) Never done    DIABETIC EYE EXAM  10/23/2019    COVID-19 Vaccine (3 - Moderna series) 06/09/2022    ANNUAL WELLNESS VISIT  02/02/2023    HEMOGLOBIN A1C  09/22/2023    INFLUENZA VACCINE  10/01/2023    LIPID PANEL  03/17/2024    URINE MICROALBUMIN  03/17/2024    TDAP/TD VACCINES (3 - Td or Tdap) 06/01/2027    Pneumococcal Vaccine 65+  Completed                  CMS Preventative Services Quick Reference  Risk Factors Identified During Encounter:    None Identified  Polypharmacy: Medication List reviewed    The above risks/problems have been discussed with the patient.  Pertinent information has been shared with the patient in the After Visit Summary.    Diagnoses and all orders for this visit:    1. Visit for well [place] health check (Primary)    2. Medicare annual wellness visit, subsequent    3. Healthcare maintenance    4. Vitamin B12 deficiency  -     cyanocobalamin injection 1,000 mcg    5. Urinary incontinence, unspecified type  -     Discontinue: Mirabegron ER (Myrbetriq) 25 MG tablet sustained-release 24 hour 24 hr tablet; Take 1 tablet by mouth Daily.  Dispense: 30 tablet; Refill: 6  -     Mirabegron ER (Myrbetriq) 25 MG tablet sustained-release 24 hour 24 hr tablet; Take 1 tablet by mouth Daily.  Dispense: 30 tablet; Refill: 6    6. Essential hypertension  -     CBC & Differential  -     Comprehensive Metabolic  Panel    7. Type 2 diabetes mellitus with other circulatory complication, without long-term current use of insulin  -     Hemoglobin A1c  -     Thyroid Panel With TSH  -     Microalbumin / Creatinine Urine Ratio - Urine, Clean Catch    8. Mixed hyperlipidemia  -     Comprehensive Metabolic Panel  -     Lipid Panel With LDL / HDL Ratio        Follow Up:   Next Medicare Wellness visit to be scheduled in 1 year.      An After Visit Summary and PPPS were made available to the patient.

## 2023-09-14 LAB
ALBUMIN SERPL-MCNC: 4.4 G/DL (ref 3.5–5.2)
ALBUMIN/CREAT UR: <3 MG/G CREAT (ref 0–29)
ALBUMIN/GLOB SERPL: 1.8 G/DL
ALP SERPL-CCNC: 72 U/L (ref 39–117)
ALT SERPL-CCNC: 22 U/L (ref 1–41)
AST SERPL-CCNC: 24 U/L (ref 1–40)
BASOPHILS # BLD AUTO: 0.06 10*3/MM3 (ref 0–0.2)
BASOPHILS NFR BLD AUTO: 0.8 % (ref 0–1.5)
BILIRUB SERPL-MCNC: 0.5 MG/DL (ref 0–1.2)
BUN SERPL-MCNC: 16 MG/DL (ref 8–23)
BUN/CREAT SERPL: 17.2 (ref 7–25)
CALCIUM SERPL-MCNC: 10 MG/DL (ref 8.6–10.5)
CHLORIDE SERPL-SCNC: 102 MMOL/L (ref 98–107)
CHOLEST SERPL-MCNC: 177 MG/DL (ref 0–200)
CO2 SERPL-SCNC: 27.6 MMOL/L (ref 22–29)
CREAT SERPL-MCNC: 0.93 MG/DL (ref 0.76–1.27)
CREAT UR-MCNC: 87 MG/DL
EGFRCR SERPLBLD CKD-EPI 2021: 81.5 ML/MIN/1.73
EOSINOPHIL # BLD AUTO: 0.38 10*3/MM3 (ref 0–0.4)
EOSINOPHIL NFR BLD AUTO: 5.2 % (ref 0.3–6.2)
ERYTHROCYTE [DISTWIDTH] IN BLOOD BY AUTOMATED COUNT: 12.1 % (ref 12.3–15.4)
FT4I SERPL CALC-MCNC: 2.4 (ref 1.2–4.9)
GLOBULIN SER CALC-MCNC: 2.4 GM/DL
GLUCOSE SERPL-MCNC: 121 MG/DL (ref 65–99)
HBA1C MFR BLD: 8.4 % (ref 4.8–5.6)
HCT VFR BLD AUTO: 37.5 % (ref 37.5–51)
HDLC SERPL-MCNC: 78 MG/DL (ref 40–60)
HGB BLD-MCNC: 12.4 G/DL (ref 13–17.7)
IMM GRANULOCYTES # BLD AUTO: 0.02 10*3/MM3 (ref 0–0.05)
IMM GRANULOCYTES NFR BLD AUTO: 0.3 % (ref 0–0.5)
LDLC SERPL CALC-MCNC: 82 MG/DL (ref 0–100)
LDLC/HDLC SERPL: 1.03 {RATIO}
LYMPHOCYTES # BLD AUTO: 2.09 10*3/MM3 (ref 0.7–3.1)
LYMPHOCYTES NFR BLD AUTO: 28.5 % (ref 19.6–45.3)
MCH RBC QN AUTO: 30.1 PG (ref 26.6–33)
MCHC RBC AUTO-ENTMCNC: 33.1 G/DL (ref 31.5–35.7)
MCV RBC AUTO: 91 FL (ref 79–97)
MICROALBUMIN UR-MCNC: <3 UG/ML
MONOCYTES # BLD AUTO: 0.49 10*3/MM3 (ref 0.1–0.9)
MONOCYTES NFR BLD AUTO: 6.7 % (ref 5–12)
NEUTROPHILS # BLD AUTO: 4.3 10*3/MM3 (ref 1.7–7)
NEUTROPHILS NFR BLD AUTO: 58.5 % (ref 42.7–76)
NRBC BLD AUTO-RTO: 0 /100 WBC (ref 0–0.2)
PLATELET # BLD AUTO: 342 10*3/MM3 (ref 140–450)
POTASSIUM SERPL-SCNC: 4.6 MMOL/L (ref 3.5–5.2)
PROT SERPL-MCNC: 6.8 G/DL (ref 6–8.5)
RBC # BLD AUTO: 4.12 10*6/MM3 (ref 4.14–5.8)
SODIUM SERPL-SCNC: 138 MMOL/L (ref 136–145)
T3RU NFR SERPL: 25 % (ref 24–39)
T4 SERPL-MCNC: 9.4 UG/DL (ref 4.5–12)
TRIGL SERPL-MCNC: 93 MG/DL (ref 0–150)
TSH SERPL DL<=0.005 MIU/L-ACNC: 0.61 UIU/ML (ref 0.45–4.5)
VLDLC SERPL CALC-MCNC: 17 MG/DL (ref 5–40)
WBC # BLD AUTO: 7.34 10*3/MM3 (ref 3.4–10.8)

## 2023-09-18 NOTE — PROGRESS NOTES
Please inform the patient of the following abnormal results. Would like to increase the ozempic. He should be on 0.5mg weekly. Would like to increase it to 1mg weekly. Has he been taking the ozempic? If not we need to start at the lower dose and slowly titrate him up.

## 2023-10-15 DIAGNOSIS — I10 ESSENTIAL HYPERTENSION: ICD-10-CM

## 2023-10-16 RX ORDER — LISINOPRIL 20 MG/1
TABLET ORAL
Qty: 90 TABLET | Refills: 0 | Status: SHIPPED | OUTPATIENT
Start: 2023-10-16

## 2023-10-30 ENCOUNTER — OFFICE VISIT (OUTPATIENT)
Age: 83
End: 2023-10-30
Payer: MEDICARE

## 2023-10-30 VITALS
DIASTOLIC BLOOD PRESSURE: 78 MMHG | HEART RATE: 82 BPM | WEIGHT: 253 LBS | BODY MASS INDEX: 40.66 KG/M2 | SYSTOLIC BLOOD PRESSURE: 134 MMHG | HEIGHT: 66 IN

## 2023-10-30 DIAGNOSIS — I47.29 NSVT (NONSUSTAINED VENTRICULAR TACHYCARDIA): ICD-10-CM

## 2023-10-30 DIAGNOSIS — I48.19 ATRIAL FIBRILLATION, PERSISTENT: Primary | ICD-10-CM

## 2023-10-30 DIAGNOSIS — I42.8 NICM (NONISCHEMIC CARDIOMYOPATHY): ICD-10-CM

## 2023-10-30 DIAGNOSIS — E78.00 HYPERCHOLESTEROLEMIA: ICD-10-CM

## 2023-10-30 DIAGNOSIS — I10 PRIMARY HYPERTENSION: ICD-10-CM

## 2023-10-30 DIAGNOSIS — I50.42 CHRONIC COMBINED SYSTOLIC AND DIASTOLIC CONGESTIVE HEART FAILURE: ICD-10-CM

## 2023-10-30 PROCEDURE — 1159F MED LIST DOCD IN RCRD: CPT | Performed by: NURSE PRACTITIONER

## 2023-10-30 PROCEDURE — 93000 ELECTROCARDIOGRAM COMPLETE: CPT | Performed by: NURSE PRACTITIONER

## 2023-10-30 PROCEDURE — 3075F SYST BP GE 130 - 139MM HG: CPT | Performed by: NURSE PRACTITIONER

## 2023-10-30 PROCEDURE — 1160F RVW MEDS BY RX/DR IN RCRD: CPT | Performed by: NURSE PRACTITIONER

## 2023-10-30 PROCEDURE — 99214 OFFICE O/P EST MOD 30 MIN: CPT | Performed by: NURSE PRACTITIONER

## 2023-10-30 PROCEDURE — 3078F DIAST BP <80 MM HG: CPT | Performed by: NURSE PRACTITIONER

## 2023-11-17 DIAGNOSIS — E11.59 TYPE 2 DIABETES MELLITUS WITH OTHER CIRCULATORY COMPLICATION, WITHOUT LONG-TERM CURRENT USE OF INSULIN: ICD-10-CM

## 2023-11-17 RX ORDER — GLIPIZIDE 10 MG/1
10 TABLET ORAL
Qty: 180 TABLET | Refills: 0 | Status: SHIPPED | OUTPATIENT
Start: 2023-11-17

## 2024-01-09 DIAGNOSIS — I10 ESSENTIAL HYPERTENSION: ICD-10-CM

## 2024-01-10 RX ORDER — LISINOPRIL 20 MG/1
TABLET ORAL
Qty: 90 TABLET | Refills: 0 | Status: SHIPPED | OUTPATIENT
Start: 2024-01-10

## 2024-02-13 DIAGNOSIS — E11.59 TYPE 2 DIABETES MELLITUS WITH OTHER CIRCULATORY COMPLICATION, WITHOUT LONG-TERM CURRENT USE OF INSULIN: ICD-10-CM

## 2024-02-13 RX ORDER — GLIPIZIDE 10 MG/1
10 TABLET ORAL
Qty: 180 TABLET | Refills: 0 | Status: SHIPPED | OUTPATIENT
Start: 2024-02-13

## 2024-03-01 ENCOUNTER — PATIENT MESSAGE (OUTPATIENT)
Dept: INTERNAL MEDICINE | Facility: CLINIC | Age: 84
End: 2024-03-01
Payer: MEDICARE

## 2024-03-04 NOTE — TELEPHONE ENCOUNTER
From: Augustine WILSON Sample  To: Thony Mclean  Sent: 3/1/2024 4:09 PM EST  Subject: Lab work     Was wondering how soon can lab work be done for Augustine’s upcoming visit on March 13 it states on or before March 13.   Thanks

## 2024-03-08 DIAGNOSIS — Z00.00 HEALTHCARE MAINTENANCE: ICD-10-CM

## 2024-03-09 LAB
ALBUMIN SERPL-MCNC: 4 G/DL (ref 3.5–5.2)
ALBUMIN/GLOB SERPL: 2 G/DL
ALP SERPL-CCNC: 45 U/L (ref 39–117)
ALT SERPL-CCNC: 24 U/L (ref 1–41)
AST SERPL-CCNC: 24 U/L (ref 1–40)
BASOPHILS # BLD AUTO: 0.06 10*3/MM3 (ref 0–0.2)
BASOPHILS NFR BLD AUTO: 0.8 % (ref 0–1.5)
BILIRUB SERPL-MCNC: 0.6 MG/DL (ref 0–1.2)
BUN SERPL-MCNC: 16 MG/DL (ref 8–23)
BUN/CREAT SERPL: 18.6 (ref 7–25)
CALCIUM SERPL-MCNC: 9.1 MG/DL (ref 8.6–10.5)
CHLORIDE SERPL-SCNC: 101 MMOL/L (ref 98–107)
CHOLEST SERPL-MCNC: 151 MG/DL (ref 0–200)
CO2 SERPL-SCNC: 24.8 MMOL/L (ref 22–29)
CREAT SERPL-MCNC: 0.86 MG/DL (ref 0.76–1.27)
EGFRCR SERPLBLD CKD-EPI 2021: 85.9 ML/MIN/1.73
EOSINOPHIL # BLD AUTO: 0.09 10*3/MM3 (ref 0–0.4)
EOSINOPHIL NFR BLD AUTO: 1.1 % (ref 0.3–6.2)
ERYTHROCYTE [DISTWIDTH] IN BLOOD BY AUTOMATED COUNT: 13.4 % (ref 12.3–15.4)
FT4I SERPL CALC-MCNC: 2 (ref 1.2–4.9)
GLOBULIN SER CALC-MCNC: 2 GM/DL
GLUCOSE SERPL-MCNC: 154 MG/DL (ref 65–99)
HBA1C MFR BLD: 7.3 % (ref 4.8–5.6)
HCT VFR BLD AUTO: 45 % (ref 37.5–51)
HDLC SERPL-MCNC: 44 MG/DL (ref 40–60)
HGB BLD-MCNC: 14.8 G/DL (ref 13–17.7)
IMM GRANULOCYTES # BLD AUTO: 0.02 10*3/MM3 (ref 0–0.05)
IMM GRANULOCYTES NFR BLD AUTO: 0.3 % (ref 0–0.5)
LDLC SERPL CALC-MCNC: 67 MG/DL (ref 0–100)
LDLC/HDLC SERPL: 1.3 {RATIO}
LYMPHOCYTES # BLD AUTO: 1.66 10*3/MM3 (ref 0.7–3.1)
LYMPHOCYTES NFR BLD AUTO: 20.9 % (ref 19.6–45.3)
MCH RBC QN AUTO: 31.6 PG (ref 26.6–33)
MCHC RBC AUTO-ENTMCNC: 32.9 G/DL (ref 31.5–35.7)
MCV RBC AUTO: 95.9 FL (ref 79–97)
MONOCYTES # BLD AUTO: 0.79 10*3/MM3 (ref 0.1–0.9)
MONOCYTES NFR BLD AUTO: 10 % (ref 5–12)
NEUTROPHILS # BLD AUTO: 5.31 10*3/MM3 (ref 1.7–7)
NEUTROPHILS NFR BLD AUTO: 66.9 % (ref 42.7–76)
NRBC BLD AUTO-RTO: 0 /100 WBC (ref 0–0.2)
PLATELET # BLD AUTO: 257 10*3/MM3 (ref 140–450)
POTASSIUM SERPL-SCNC: 4.5 MMOL/L (ref 3.5–5.2)
PROT SERPL-MCNC: 6 G/DL (ref 6–8.5)
RBC # BLD AUTO: 4.69 10*6/MM3 (ref 4.14–5.8)
SODIUM SERPL-SCNC: 141 MMOL/L (ref 136–145)
T3RU NFR SERPL: 29 % (ref 24–39)
T4 SERPL-MCNC: 6.8 UG/DL (ref 4.5–12)
TRIGL SERPL-MCNC: 248 MG/DL (ref 0–150)
TSH SERPL DL<=0.005 MIU/L-ACNC: 2.69 UIU/ML (ref 0.45–4.5)
VLDLC SERPL CALC-MCNC: 40 MG/DL (ref 5–40)
WBC # BLD AUTO: 7.93 10*3/MM3 (ref 3.4–10.8)

## 2024-03-13 ENCOUNTER — OFFICE VISIT (OUTPATIENT)
Dept: INTERNAL MEDICINE | Facility: CLINIC | Age: 84
End: 2024-03-13
Payer: MEDICARE

## 2024-03-13 ENCOUNTER — TELEPHONE (OUTPATIENT)
Dept: INTERNAL MEDICINE | Facility: CLINIC | Age: 84
End: 2024-03-13

## 2024-03-13 VITALS
HEIGHT: 66 IN | SYSTOLIC BLOOD PRESSURE: 106 MMHG | DIASTOLIC BLOOD PRESSURE: 72 MMHG | BODY MASS INDEX: 39.37 KG/M2 | HEART RATE: 69 BPM | OXYGEN SATURATION: 96 % | WEIGHT: 245 LBS | RESPIRATION RATE: 14 BRPM

## 2024-03-13 DIAGNOSIS — E78.2 MIXED HYPERLIPIDEMIA: ICD-10-CM

## 2024-03-13 DIAGNOSIS — I10 ESSENTIAL HYPERTENSION: ICD-10-CM

## 2024-03-13 DIAGNOSIS — I48.19 PERSISTENT ATRIAL FIBRILLATION: ICD-10-CM

## 2024-03-13 DIAGNOSIS — E11.59 TYPE 2 DIABETES MELLITUS WITH OTHER CIRCULATORY COMPLICATION, WITHOUT LONG-TERM CURRENT USE OF INSULIN: Primary | ICD-10-CM

## 2024-03-13 RX ORDER — GLIPIZIDE 10 MG/1
10 TABLET ORAL
Qty: 180 TABLET | Refills: 0 | Status: SHIPPED | OUTPATIENT
Start: 2024-03-13

## 2024-03-13 RX ORDER — LISINOPRIL 20 MG/1
20 TABLET ORAL DAILY
Qty: 90 TABLET | Refills: 0 | Status: SHIPPED | OUTPATIENT
Start: 2024-03-13

## 2024-03-13 RX ORDER — FUROSEMIDE 40 MG/1
40 TABLET ORAL 2 TIMES DAILY
Qty: 180 TABLET | Refills: 1 | Status: SHIPPED | OUTPATIENT
Start: 2024-03-13

## 2024-03-13 NOTE — PROGRESS NOTES
"Chief Complaint  Diabetes    Subjective          Augustine WILSON Sample presents to [facility]  History of Present Illness  The patient is an 83-year-old male who is here for follow-up at today's office visit. For his office visit, he is following up on his diabetes and blood pressure.    For his diabetes, he is currently taking Jardiance 25 mg daily and glipizide 10 mg twice a day. He is not taking Ozempic injections.    He is taking Eliquis for AFib. [facility]    He is not on simvastatin anymore for cholesterol. He was put on atorvastatin 10 mg daily. He is on alogliptin.    He needs refills on lisinopril 20 mg, glipizide 10 mg, and furosemide 40 mg. He is taking furosemide 40 mg 1 pill a day.    Objective   Vital Signs:   /72 (BP Location: Left arm, Patient Position: Sitting, Cuff Size: Adult)   Pulse 69   Resp 14   Ht 167.6 cm (66\")   Wt 111 kg (245 lb)   SpO2 96%   BMI 39.54 kg/m²     Physical Exam  Vitals and nursing note reviewed.   Constitutional:       Appearance: He is well-developed.   HENT:      Head: Normocephalic and atraumatic.   Musculoskeletal:      Cervical back: Normal range of motion and neck supple.   Neurological:      Mental Status: He is alert and oriented to person, place, and time.   Psychiatric:         Behavior: Behavior normal.         Physical Exam       Result Review :                 Assessment and Plan    Diagnoses and all orders for this visit:    1. Type 2 diabetes mellitus with other circulatory complication, without long-term current use of insulin (Primary)  -     glipizide (GLUCOTROL) 10 MG tablet; Take 1 tablet by mouth 2 (Two) Times a Day Before Meals.  Dispense: 180 tablet; Refill: 0  -     Hemoglobin A1c  -     Thyroid Panel With TSH    2. Persistent atrial fibrillation    3. Essential hypertension  -     lisinopril (PRINIVIL,ZESTRIL) 20 MG tablet; Take 1 tablet by mouth Daily.  Dispense: 90 tablet; " Refill: 0  -     CBC & Differential  -     Comprehensive Metabolic Panel    4. Mixed hyperlipidemia  -     Lipid Panel With LDL / HDL Ratio  -     Microalbumin / Creatinine Urine Ratio - Urine, Clean Catch    Other orders  -     furosemide (LASIX) 40 MG tablet; Take 1 tablet by mouth 2 (Two) Times a Day.  Dispense: 180 tablet; Refill: 1      Assessment & Plan  1. Diabetes.  His A1c is 7.3. He will continue Jardiance, alogliptin, and glipizide.    2. Atrial fibrillation.  He will continue Eliquis. He was given samples of Eliquis.    3. Hyperlipidemia.  His triglycerides were slightly elevated. He will continue atorvastatin 10 mg daily. He was advised to work on his diet and exercise.    4. Hypertension.  He was given a refill on lisinopril 20 mg.    Follow Up   No follow-ups on file.  Patient was given instructions and counseling regarding his condition or for health maintenance advice. Please see specific information pulled into the AVS if appropriate.       Patient or patient representative verbalized consent for the use of Ambient Listening during the visit with  Thony Mclean MD for chart documentation. 3/13/2024  11:44 EDT

## 2024-03-18 NOTE — PROGRESS NOTES
Please inform the patient of the following abnormal results. Hba1c is improved, needs to continue current medications.

## 2024-03-28 ENCOUNTER — TELEPHONE (OUTPATIENT)
Dept: CARDIOLOGY | Facility: CLINIC | Age: 84
End: 2024-03-28

## 2024-03-28 NOTE — TELEPHONE ENCOUNTER
Caller: MERLEWILIAM    Relationship: Emergency Contact    Best call back number:  [phone]    What is the best time to reach you: ANYTIME      What was the call regarding: PLEASE PROVIDE RECOMMENDATION OF POSSIBLE CARDIOLOGIST WITH [facility] DUE TO CHANGES WITH [facility].    Is it okay if the provider responds through MyChart: YES

## 2024-05-03 ENCOUNTER — OFFICE VISIT (OUTPATIENT)
Dept: CARDIOLOGY | Facility: CLINIC | Age: 84
End: 2024-05-03
Payer: MEDICARE

## 2024-05-03 VITALS
HEIGHT: 66 IN | SYSTOLIC BLOOD PRESSURE: 118 MMHG | WEIGHT: 148 LBS | BODY MASS INDEX: 23.78 KG/M2 | HEART RATE: 52 BPM | DIASTOLIC BLOOD PRESSURE: 74 MMHG | OXYGEN SATURATION: 99 %

## 2024-05-03 DIAGNOSIS — I10 ESSENTIAL HYPERTENSION: ICD-10-CM

## 2024-05-03 DIAGNOSIS — I42.8 NICM (NONISCHEMIC CARDIOMYOPATHY): ICD-10-CM

## 2024-05-03 DIAGNOSIS — I50.42 CHRONIC COMBINED SYSTOLIC AND DIASTOLIC CONGESTIVE HEART FAILURE: ICD-10-CM

## 2024-05-03 DIAGNOSIS — I48.19 ATRIAL FIBRILLATION, PERSISTENT: Primary | ICD-10-CM

## 2024-05-03 RX ORDER — METOPROLOL SUCCINATE 200 MG/1
200 TABLET, EXTENDED RELEASE ORAL 2 TIMES DAILY
Qty: 180 TABLET | Refills: 0 | Status: SHIPPED | OUTPATIENT
Start: 2024-05-03 | End: 2025-09-12

## 2024-05-03 RX ORDER — SPIRONOLACTONE 25 MG/1
12.5 TABLET ORAL DAILY
Qty: 90 TABLET | Refills: 3 | Status: SHIPPED | OUTPATIENT
Start: 2024-05-03

## 2024-05-03 NOTE — PROGRESS NOTES
PATIENTINFORMATION    Date of Office Visit: 2024  Encounter Provider: Piotr Montemayor MD  Place of Service: [facility] CARDIOLOG[facility]  Patient Name: Augustine Chen  : 1940    Subjective:     Encounter Date:2024      Patient ID: Augustine Chen is a 83 y.o. male.    No chief complaint on file.    HPI  Mr. Chen a pleasant 83 years old gentleman who comes cardiology clinic for follow-up visit.  He was accompanied by his wife.  He has been doing fairly well.  He denies any rest or exertional chest pain, shortness of breath, orthopnea, PND, palpitations, presyncope syncope or extremity swelling.  No ER visit hospitalizations since last clinic visit.  Very compliant with his current medications without significant side effects.  No bleeding from any site on Eliquis.      ROS  All systems reviewed and negative except as noted in HPI.    Past Medical History:   Diagnosis Date    Arrhythmia     Atrial fibrillation     Benign prostatic hyperplasia     Cataract     25 years ago    CHF (congestive heart failure)     Diabetes mellitus     Hyperlipidemia     Hypertension        Past Surgical History:   Procedure Laterality Date    CARDIAC CATHETERIZATION N/A 10/21/2019    Procedure: Right and Left Heart Cath;  Surgeon: Ashley Walker MD;  [facility];  Service: Cardiovascular    CARDIAC CATHETERIZATION N/A 10/21/2019    Procedure: Coronary angiography;  Surgeon: Ashley Walker MD;  Location:  [facility] CATH INVASIVE LOCATION;  Service: Cardiovascular    CARDIAC CATHETERIZATION N/A 10/21/2019    Procedure: Left ventriculography- pressures only- no lv injection;  Surgeon: Ashley Walker MD;  Location:  [facility] CATH INVASIVE LOCATION;  Service: Cardiovascular    CATARACT EXTRACTION WITH INTRAOCULAR LENS IMPLANT         Social History     Socioeconomic History    Marital status:    Tobacco Use    Smoking status: Former     Current packs/day: 0.00     Types:  "Cigarettes     Quit date: 1982     Years since quittin.3    Smokeless tobacco: Never    Tobacco comments:     Dont know   Vaping Use    Vaping status: Never Used   Substance and Sexual Activity    Alcohol use: Yes     Comment: Socially    Drug use: Never    Sexual activity: Yes     Partners: Female       Family History   Problem Relation Age of Onset    Heart disease Mother     Heart attack Mother     Heart attack Brother          Procedures       Objective:     /74   Pulse 52   Ht 167.6 cm (66\")   Wt 67.1 kg (148 lb)   SpO2 99%   BMI 23.89 kg/m²  Body mass index is 23.89 kg/m².     Constitutional:       General: Not in acute distress.     Appearance: Well-developed. Not diaphoretic.   Eyes:      Pupils: Pupils are equal, round, and reactive to light.   HENT:      Head: Normocephalic and atraumatic.   Neck:      Thyroid: No thyromegaly.   Pulmonary:      Effort: Pulmonary effort is normal. No respiratory distress.      Breath sounds: Normal breath sounds. No wheezing. No rales.   Chest:      Chest wall: Not tender to palpatation.   Cardiovascular:      Normal rate. Irregularly irregular rhythm.      No gallop.    Pulses:     Intact distal pulses.   Edema:     Peripheral edema absent.   Abdominal:      General: Bowel sounds are normal. There is no distension.      Palpations: Abdomen is soft.      Tenderness: There is no guarding.   Musculoskeletal: Normal range of motion.         General: No deformity.      Cervical back: Normal range of motion and neck supple. Skin:     General: Skin is warm and dry.      Findings: No rash.   Neurological:      Mental Status: Alert and oriented to person, place, and time.      Cranial Nerves: No cranial nerve deficit.      Deep Tendon Reflexes: Reflexes are normal and symmetric.   Psychiatric:         Judgment: Judgment normal.         Review Of Data: I have reviewed pertinent recent labs, images and documents and pertinent findings included in HPI or assessment " below.    Lipid Panel          9/13/2023    08:10 3/8/2024    07:33   Lipid Panel   Total Cholesterol 177  151    Triglycerides 93  248    HDL Cholesterol 78  44    VLDL Cholesterol 17  40    LDL Cholesterol  82  67    LDL/HDL Ratio 1.03  1.30      I reviewed report of [facility].  Mild global left ventricular hypokinesis reported with left ventricular ejection fraction of 43%.  Grade 2 diastolic dysfunction reported.  Moderate left atrial enlargement, mild mitral valve regurgitation mild aortic valve stenosis             Assessment/Plan:     Nonischemic cardiomyopathy: EF of 29% on echo done in 3/2022.  Coronary angiogram in 2019 with nonobstructive coronary artery disease-EF on ventriculogram was 23%.  [facility] reported left ventricular ejection fraction of 43% with mild global hypokinesis, grade 2 diastolic function, moderate left atrial enlargement, mild mitral valve regurgitation and mild aortic valve stenosis.  Persistent atrial fibrillation on chronic anticoagulation with Eliquis and metoprolol XL.  Frequent PVCs per prior Holter's: 5.6%.  Resolved  Essential hypertension: Fairly controlled  Pulmonary hypertension  Type 2 diabetes mellitus  Morbidly obese  Mr. Chen is doing fairly well from cardiac standpoint.  Euvolemic on exam.  Vital signs within range.  Start spironolactone and DC potassium.  BMP in 2 weeks.  Follow-up in 6 months-repeat echo during follow-up    Diagnosis and plan of care discussed with patient and verbalized understanding.            Your medication list            Accurate as of May 3, 2024  8:45 AM. If you have any questions, ask your nurse or doctor.                START taking these medications        Instructions Last Dose Given Next Dose Due   spironolactone 25 MG tablet  Commonly known as: ALDACTONE  Started by: Piotr Montemayor MD      Take 0.5 tablets by mouth Daily.              CONTINUE taking these medications         Instructions Last Dose Given Next Dose Due   Alogliptin Benzoate 25 MG tablet      Take 1 tablet by mouth. [facility]       apixaban 5 MG tablet tablet  Commonly known as: ELIQUIS      Take 1 tablet by mouth Every 12 (Twelve) Hours.       atorvastatin 10 MG tablet  Commonly known as: LIPITOR      Take 1 tablet by mouth Daily. [facility]       digoxin 125 MCG tablet  Commonly known as: LANOXIN      Take 1 tablet by mouth Daily.       empagliflozin 25 MG tablet tablet  Commonly known as: Jardiance      Take 1 tablet by mouth Daily.       furosemide 40 MG tablet  Commonly known as: LASIX      Take 1 tablet by mouth 2 (Two) Times a Day.       glipizide 10 MG tablet  Commonly known as: GLUCOTROL      Take 1 tablet by mouth 2 (Two) Times a Day Before Meals.       lisinopril 20 MG tablet  Commonly known as: PRINIVIL,ZESTRIL      Take 1 tablet by mouth Daily.       metFORMIN 500 MG tablet  Commonly known as: GLUCOPHAGE      Take 1 tablet by mouth 2 (Two) Times a Day With Meals.       metoprolol succinate  MG 24 hr tablet  Commonly known as: TOPROL-XL      Take 1 tablet by mouth 2 (Two) Times a Day.       Mirabegron ER 25 MG tablet sustained-release 24 hour 24 hr tablet  Commonly known as: Myrbetriq      Take 1 tablet by mouth Daily.       omeprazole OTC 20 MG EC tablet  Commonly known as: PriLOSEC OTC      Take 1 tablet by mouth Daily As Needed.              STOP taking these medications      potassium chloride 20 MEQ CR tablet  Commonly known as: KLOR-CON M20  Stopped by: Piotr Montemayor MD                  Where to Get Your Medications        These medications were sent to [facility] [address] [place] [id number] [facility] [phone]  - [phone]   [id number] [place] [id number]      Phone: [phone]   metoprolol succinate  MG 24 hr tablet  spironolactone 25 MG tablet             Piotr Montemayor MD  05/03/24  08:45 EDT

## 2024-05-10 ENCOUNTER — PATIENT ROUNDING (BHMG ONLY) (OUTPATIENT)
Dept: URGENT CARE | Facility: CLINIC | Age: 84
End: 2024-05-10
Payer: MEDICARE

## 2024-06-27 RX ORDER — SPIRONOLACTONE 25 MG/1
12.5 TABLET ORAL DAILY
Qty: 90 TABLET | Refills: 3 | Status: SHIPPED | OUTPATIENT
Start: 2024-06-27

## 2024-06-30 DIAGNOSIS — I10 ESSENTIAL HYPERTENSION: ICD-10-CM

## 2024-07-01 RX ORDER — LISINOPRIL 20 MG/1
20 TABLET ORAL DAILY
Qty: 90 TABLET | Refills: 0 | Status: SHIPPED | OUTPATIENT
Start: 2024-07-01

## 2024-07-31 RX ORDER — METOPROLOL SUCCINATE 200 MG/1
200 TABLET, EXTENDED RELEASE ORAL 2 TIMES DAILY
Qty: 180 TABLET | Refills: 0 | Status: SHIPPED | OUTPATIENT
Start: 2024-07-31

## 2024-08-02 DIAGNOSIS — E11.59 TYPE 2 DIABETES MELLITUS WITH OTHER CIRCULATORY COMPLICATION, WITHOUT LONG-TERM CURRENT USE OF INSULIN: ICD-10-CM

## 2024-08-02 RX ORDER — GLIPIZIDE 10 MG/1
10 TABLET ORAL
Qty: 180 TABLET | Refills: 0 | Status: SHIPPED | OUTPATIENT
Start: 2024-08-02

## 2024-08-02 NOTE — TELEPHONE ENCOUNTER
Rx Refill Note  Requested Prescriptions     Pending Prescriptions Disp Refills    glipizide (GLUCOTROL) 10 MG tablet [Pharmacy Med Name: glipiZIDE 10 MG Oral Tablet] 180 tablet 0     Sig: TAKE 1 TABLET BY MOUTH TWICE DAILY BEFORE MEAL(S)      Last office visit with prescribing clinician: 3/13/2024   Last telemedicine visit with prescribing clinician: Visit date not found   Next office visit with prescribing clinician: Visit date not found                         Would you like a call back once the refill request has been completed: [] Yes [] No    If the office needs to give you a call back, can they leave a voicemail: [] Yes [] No    Rachel Ellis MA  08/02/24, 12:44 EDT

## 2024-09-05 RX ORDER — FUROSEMIDE 40 MG
40 TABLET ORAL 2 TIMES DAILY
Qty: 180 TABLET | Refills: 0 | Status: SHIPPED | OUTPATIENT
Start: 2024-09-05

## 2024-09-28 DIAGNOSIS — I10 ESSENTIAL HYPERTENSION: ICD-10-CM

## 2024-09-30 RX ORDER — LISINOPRIL 20 MG/1
20 TABLET ORAL DAILY
Qty: 90 TABLET | Refills: 0 | Status: SHIPPED | OUTPATIENT
Start: 2024-09-30

## 2024-09-30 NOTE — TELEPHONE ENCOUNTER
Rx Refill Note  Requested Prescriptions     Pending Prescriptions Disp Refills    lisinopril (PRINIVIL,ZESTRIL) 20 MG tablet [Pharmacy Med Name: Lisinopril 20 MG Oral Tablet] 90 tablet 0     Sig: Take 1 tablet by mouth once daily      Last office visit with prescribing clinician: 3/13/2024   Last telemedicine visit with prescribing clinician: Visit date not found   Next office visit with prescribing clinician: Visit date not found       {TIP  Please add Last Relevant Lab Date if appropriate: 03/08/24                 Would you like a call back once the refill request has been completed: [] Yes [] No    If the office needs to give you a call back, can they leave a voicemail: [] Yes [] No    Darby Michaels MA  09/30/24, 10:59 EDT

## 2024-11-08 ENCOUNTER — OFFICE VISIT (OUTPATIENT)
Dept: CARDIOLOGY | Facility: CLINIC | Age: 84
End: 2024-11-08
Payer: MEDICARE

## 2024-11-08 ENCOUNTER — TELEPHONE (OUTPATIENT)
Dept: CARDIOLOGY | Facility: CLINIC | Age: 84
End: 2024-11-08

## 2024-11-08 VITALS
BODY MASS INDEX: 39.86 KG/M2 | HEIGHT: 66 IN | SYSTOLIC BLOOD PRESSURE: 140 MMHG | DIASTOLIC BLOOD PRESSURE: 80 MMHG | WEIGHT: 248 LBS | OXYGEN SATURATION: 97 %

## 2024-11-08 DIAGNOSIS — I48.19 ATRIAL FIBRILLATION, PERSISTENT: Primary | ICD-10-CM

## 2024-11-08 DIAGNOSIS — I42.8 NICM (NONISCHEMIC CARDIOMYOPATHY): ICD-10-CM

## 2024-11-08 DIAGNOSIS — E78.00 HYPERCHOLESTEROLEMIA: ICD-10-CM

## 2024-11-08 DIAGNOSIS — I50.42 CHRONIC COMBINED SYSTOLIC AND DIASTOLIC CONGESTIVE HEART FAILURE: ICD-10-CM

## 2024-11-08 DIAGNOSIS — Z79.01 CHRONIC ANTICOAGULATION: ICD-10-CM

## 2024-11-08 NOTE — PROGRESS NOTES
PATIENTINFORMATION    Date of Office Visit: 2024  Encounter Provider: Piotr Montemayor MD  Place of Service: [facility] CARDIOLOGY  Patient Name: Augustine Chen  : 1940    Subjective:     Encounter Date:2024      Patient ID: Augustine Chen is a 84 y.o. male.    Chief Complaint   Patient presents with    Atrial fibrillation, persistent      Patient is in the office today for his 6 month follow up appointment.     Follow-up       HPI  Mr. Chen is a pleasant 84 years old gentleman who came to cardiac clinic for follow-up visit.  No significant new complaints today.  Compliant with current medication without known side effects.  [facility] and no concerning findings.  No recent ER visit or hospitalization.      ROS  All systems reviewed and negative except as noted in HPI.    Past Medical History:   Diagnosis Date    Arrhythmia     Atrial fibrillation     Benign prostatic hyperplasia     Cataract     25 years ago    CHF (congestive heart failure)     Diabetes mellitus     Hyperlipidemia     Hypertension        Past Surgical History:   Procedure Laterality Date    CARDIAC CATHETERIZATION N/A 10/21/2019    Procedure: Right and Left Heart Cath;  Surgeon: Ashley Walker MD;  Location: [facility] INVASIVE LOCATION;  Service: Cardiovascular    CARDIAC CATHETERIZATION N/A 10/21/2019    Procedure: Coronary angiography;  Surgeon: Ashley Walker MD;  Location[facility] CATH INVASIVE LOC[facility];  Service: Cardiovascular    CARDIAC CATHETERIZATION N/A 10/21/2019    Procedure: Left ventriculography- pressures only- no lv injection;  Surgeon: Ashley Walker MD;  Location: [facility] CATH INVASIVE LOCATION;  Service: Cardiovascular    CATARACT EXTRACTION WITH INTRAOCULAR LENS IMPLANT         Social History     Socioeconomic History    Marital status:    Tobacco Use    Smoking status: Former     Current packs/day: 0.00     Types: Cigarettes     Quit  "date: 1982     Years since quittin.8    Smokeless tobacco: Never    Tobacco comments:     Dont know   Vaping Use    Vaping status: Never Used   Substance and Sexual Activity    Alcohol use: Yes     Comment: Socially    Drug use: Never    Sexual activity: Yes     Partners: Female       Family History   Problem Relation Age of Onset    Heart disease Mother     Heart attack Mother     Heart attack Brother          Procedures       Objective:     /80   Ht 167.6 cm (66\")   Wt 112 kg (248 lb)   SpO2 97%   BMI 40.03 kg/m²  Body mass index is 40.03 kg/m².     Constitutional:       General: Not in acute distress.     Appearance: Well-developed. Not diaphoretic.   Eyes:      Pupils: Pupils are equal, round, and reactive to light.   HENT:      Head: Normocephalic and atraumatic.   Neck:      Thyroid: No thyromegaly.   Pulmonary:      Effort: Pulmonary effort is normal. No respiratory distress.      Breath sounds: Normal breath sounds. No wheezing. No rales.   Chest:      Chest wall: Not tender to palpatation.   Cardiovascular:      Normal rate. Irregularly irregular rhythm.      No gallop.    Pulses:     Intact distal pulses.   Edema:     Peripheral edema absent.   Abdominal:      General: Bowel sounds are normal. There is no distension.      Palpations: Abdomen is soft.      Tenderness: There is no guarding.   Musculoskeletal: Normal range of motion.         General: No deformity.      Cervical back: Normal range of motion and neck supple. Skin:     General: Skin is warm and dry.      Findings: No rash.   Neurological:      Mental Status: Alert and oriented to person, place, and time.      Cranial Nerves: No cranial nerve deficit.      Deep Tendon Reflexes: Reflexes are normal and symmetric.   Psychiatric:         Judgment: Judgment normal.         Review Of Data: I have reviewed pertinent recent labs, images and documents and pertinent findings included in HPI or assessment below.    Lipid Panel          " 3/8/2024    07:33   Lipid Panel   Total Cholesterol 151    Triglycerides 248    HDL Cholesterol 44    VLDL Cholesterol 40    LDL Cholesterol  67    LDL/HDL Ratio 1.30          Assessment/Plan:     Nonischemic cardiomyopathy: EF of 29% on echo done in 3/2022.  Coronary angiogram in 2019 with nonobstructive coronary artery disease-EF on ventriculogram was 23%.  [facility] left ventricular ejection fraction of 43% with mild global hypokinesis, grade 2 diastolic function, moderate left atrial enlargement, mild mitral valve regurgitation and mild aortic valve stenosis.  Persistent atrial fibrillation on chronic anticoagulation with Eliquis and metoprolol XL.  Frequent PVCs per prior Holter's: 5.6%.  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  Vital signs within range.  Encouraged to increase level of activity and walk regularly.  [facility] and call me with results.  Otherwise continue current care.  Follow-up with me in 1 year or sooner with concerns  Diagnosis and plan of care discussed with patient and verbalized understanding.            Your medication list            Accurate as of November 8, 2024  4:01 PM. If you have any questions, ask your nurse or doctor.                CONTINUE taking these medications        Instructions Last Dose Given Next Dose Due   Alogliptin Benzoate 25 MG tablet      Take 1 tablet by mouth. [facility]       apixaban 5 MG tablet tablet  Commonly known as: ELIQUIS      Take 1 tablet by mouth Every 12 (Twelve) Hours.       atorvastatin 10 MG tablet  Commonly known as: LIPITOR      Take 1 tablet by mouth Daily. [facility]ensed [facility]       digoxin 125 MCG tablet  Commonly known as: LANOXIN      Take 1 tablet by mouth Daily.       empagliflozin 25 MG tablet tablet  Commonly known as:  Jardiance      Take 1 tablet by mouth Daily.       furosemide 40 MG tablet  Commonly known as: LASIX      Take 1 tablet by mouth twice daily       glipizide 10 MG tablet  Commonly known as: GLUCOTROL      TAKE 1 TABLET BY MOUTH TWICE DAILY BEFORE MEAL(S)       lisinopril 20 MG tablet  Commonly known as: PRINIVIL,ZESTRIL      Take 1 tablet by mouth once daily       metFORMIN 500 MG tablet  Commonly known as: GLUCOPHAGE      Take 1 tablet by mouth 2 (Two) Times a Day With Meals.       metoprolol succinate  MG 24 hr tablet  Commonly known as: TOPROL-XL      Take 1 tablet by mouth twice daily       Mirabegron ER 25 MG tablet sustained-release 24 hour 24 hr tablet  Commonly known as: Myrbetriq      Take 1 tablet by mouth Daily.       omeprazole OTC 20 MG EC tablet  Commonly known as: PriLOSEC OTC      Take 1 tablet by mouth Daily As Needed.       spironolactone 25 MG tablet  Commonly known as: ALDACTONE      Take 0.5 tablets by mouth Daily.                    Piotr Montemayor MD  11/08/24  16:01 EST

## 2024-11-29 NOTE — ED NOTES
Report given to ismael stinson    Ed pa reports amiodarone to be canceled completely at this time per md admit consult     Rhonda Robison RN  10/18/19 [id number]     MEDICATIONS  (STANDING):  risperiDONE   Tablet 1 milliGRAM(s) Oral at bedtime  traZODone 100 milliGRAM(s) Oral at bedtime    MEDICATIONS  (PRN):  acetaminophen     Tablet .. 650 milliGRAM(s) Oral every 6 hours PRN Temp greater or equal to 38C (100.4F), Mild Pain (1 - 3)  aluminum hydroxide/magnesium hydroxide/simethicone Suspension 30 milliLiter(s) Oral every 6 hours PRN Dyspepsia  diphenhydrAMINE 50 milliGRAM(s) Oral every 6 hours PRN Extrapyramidal symptoms or prophylaxis  diphenhydrAMINE Injectable 50 milliGRAM(s) IntraMuscular once PRN Extrapyramidal prophylaxis  haloperidol     Tablet 5 milliGRAM(s) Oral every 6 hours PRN agitation  haloperidol    Injectable 5 milliGRAM(s) IntraMuscular once PRN aggression  magnesium hydroxide Suspension 30 milliLiter(s) Oral daily PRN Constipation   MEDICATIONS  (STANDING):  risperiDONE   Tablet 1 milliGRAM(s) Oral at bedtime  traZODone 100 milliGRAM(s) Oral at bedtime    MEDICATIONS  (PRN):  acetaminophen     Tablet .. 650 milliGRAM(s) Oral every 6 hours PRN Temp greater or equal to 38C (100.4F), Mild Pain (1 - 3)  aluminum hydroxide/magnesium hydroxide/simethicone Suspension 30 milliLiter(s) Oral every 6 hours PRN Dyspepsia  diphenhydrAMINE 50 milliGRAM(s) Oral every 6 hours PRN Extrapyramidal symptoms or prophylaxis  diphenhydrAMINE Injectable 50 milliGRAM(s) IntraMuscular once PRN Extrapyramidal prophylaxis  haloperidol     Tablet 5 milliGRAM(s) Oral every 6 hours PRN agitation  haloperidol    Injectable 5 milliGRAM(s) IntraMuscular once PRN aggression  LORazepam     Tablet 2 milliGRAM(s) Oral every 6 hours PRN severe anxiety, agitation  LORazepam   Injectable 2 milliGRAM(s) IntraMuscular once PRN agitation  magnesium hydroxide Suspension 30 milliLiter(s) Oral daily PRN Constipation

## 2024-12-01 RX ORDER — FUROSEMIDE 40 MG/1
40 TABLET ORAL 2 TIMES DAILY
Qty: 180 TABLET | Refills: 0 | Status: SHIPPED | OUTPATIENT
Start: 2024-12-01

## 2024-12-26 DIAGNOSIS — I10 ESSENTIAL HYPERTENSION: ICD-10-CM

## 2024-12-26 RX ORDER — LISINOPRIL 20 MG/1
20 TABLET ORAL DAILY
Qty: 90 TABLET | Refills: 0 | Status: SHIPPED | OUTPATIENT
Start: 2024-12-26

## 2025-01-13 DIAGNOSIS — E11.59 TYPE 2 DIABETES MELLITUS WITH OTHER CIRCULATORY COMPLICATION, WITHOUT LONG-TERM CURRENT USE OF INSULIN: ICD-10-CM

## 2025-01-13 NOTE — TELEPHONE ENCOUNTER
Rx Refill Note  Requested Prescriptions     Pending Prescriptions Disp Refills    glipizide (GLUCOTROL) 10 MG tablet [Pharmacy Med Name: glipiZIDE 10 MG Oral Tablet] 180 tablet 0     Sig: TAKE 1 TABLET BY MOUTH TWICE DAILY BEFORE MEAL(S)      Last office visit with prescribing clinician: 3/13/2024   Last telemedicine visit with prescribing clinician: Visit date not found   Next office visit with prescribing clinician: Visit date not found   LEFT VM TO SCHEDULE APPOINTMENT    Analy Judd MA  01/13/25, 15:07 EST

## 2025-01-14 RX ORDER — GLIPIZIDE 10 MG/1
10 TABLET ORAL
Qty: 180 TABLET | Refills: 0 | Status: SHIPPED | OUTPATIENT
Start: 2025-01-14

## 2025-01-21 RX ORDER — METOPROLOL SUCCINATE 200 MG/1
200 TABLET, EXTENDED RELEASE ORAL 2 TIMES DAILY
Qty: 180 TABLET | Refills: 3 | Status: SHIPPED | OUTPATIENT
Start: 2025-01-21

## 2025-01-21 NOTE — TELEPHONE ENCOUNTER
NOV:11/10/2025  LOV:11/08/2024      Nonischemic cardiomyopathy: EF of 29% on echo done in 3/2022.  Coronary angiogram in 2019 with nonobstructive coronary artery disease-EF on ventriculogram was 23%.  [facility] reported left ventricular ejection fraction of 43% with mild global hypokinesis, grade 2 diastolic function, moderate left atrial enlargement, mild mitral valve regurgitation and mild aortic valve stenosis.  Persistent atrial fibrillation on chronic anticoagulation with Eliquis and metoprolol XL.  Frequent PVCs per prior Holter's: 5.6%.  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  Vital signs within range.  Encouraged to increase level of activity and walk regularly.  [facility] and call me with results.  Otherwise continue current care.  Follow-up with me in 1 year or sooner with concerns  Diagnosis and plan of care discussed with patient and verbalized understanding.

## 2025-02-13 NOTE — TELEPHONE ENCOUNTER
Rx Refill Note  Requested Prescriptions     Pending Prescriptions Disp Refills    metFORMIN (GLUCOPHAGE) 500 MG tablet [Pharmacy Med Name: metFORMIN HCl 500 MG Oral Tablet] 60 tablet 0     Sig: TAKE 1 TABLET BY MOUTH TWICE DAILY WITH MEALS      Last office visit with prescribing clinician: 3/13/2024   Last telemedicine visit with prescribing clinician: Visit date not found   Next office visit with prescribing clinician: Visit date not found     Analy Judd MA  02/13/25, 11:15 EST

## 2025-02-18 NOTE — PROGRESS NOTES
Subjective:     Encounter Date:10/30/2023      Patient ID: Augustine Chen is a 83 y.o. male.    Chief Complaint:follow up afib  History of Present Illness  This is an 84 y/o man who follows with Dr. Montemayor and is new to me today. He has a pmhx of atrial fibrillation, nonischemic cardiomyopathy, CHF, diabetes, hyperlipidemia,hypertension, morbid obesity, and pulmonary hypertension. In October 2019, he had an echocardiogram that showed an EF of 23% with severe global hypokinesis, grade 2 diastolic dysfunction, mild to moderately dilated RV cavity with mildly reduced RV systolic function, mild to moderate valvular regurgitation with an RVSP fo 52 mmHg.  He underwent  cardiac catheterization that showed moderate nonobstructive disease with severe pulmonary hypertension. He was started on GDMT. Follow up echo showed an EF of 30-35% in February 2020.     He began seeing Dr. Montemayor in February 2022 for elevated heart rate. A Holter showed a 5.58% burden of PVCs. Echocardiogram showed EF of 28.9% with mild valvular regurgitation and RVSP 39 mmHg. Toprol XL was increased. He did not have a follow up echocardiogram again until August 2023 due to financial concerns. Ultimately, [facility] that showed global left ventricular hypokinesis with EF of 43%, grade 2 diastolic dysfunction, moderate left atrial enlargement, mild aortic stenosis and mild mitral valve regurgitation.    He is here today for a follow up visit. He is feeling well with no complaints. He denies any chest pain, shortness of breath, palpitations, dizziness or syncope. He denies swelling in his lower extremities, orthopnea or PND. His blood pressure is well controlled. He denies any hematuria or melena. His PCP recently switched him from simvastatin to atorvastatin and started him on alogliptan. He is scheduled for a follow up with his PCP in December with repeat labwork to be performed then.    I have reviewed  Sx booked for 5/19. Post op appt made. All questions answered.     and updated as appropriate allergies, current medications, past family history, past medical history, past surgical history and problem list.    Review of Systems   Constitutional: Negative for fever, malaise/fatigue, weight gain and weight loss.   HENT:  Negative for congestion, hoarse voice and sore throat.    Eyes:  Negative for blurred vision and double vision.   Cardiovascular:  Negative for chest pain, dyspnea on exertion, leg swelling, orthopnea, palpitations and syncope.   Respiratory:  Negative for cough, shortness of breath and wheezing.    Gastrointestinal:  Negative for abdominal pain, hematemesis, hematochezia and melena.   Genitourinary:  Negative for dysuria and hematuria.   Neurological:  Negative for dizziness, headaches, light-headedness and numbness.   Psychiatric/Behavioral:  Negative for depression. The patient is not nervous/anxious.          Current Outpatient Medications:     Alogliptin Benzoate 25 MG tablet, Take 1 tablet by mouth. [facility], Disp: , Rfl:     apixaban (ELIQUIS) 5 MG tablet tablet, Take 1 tablet by mouth Every 12 (Twelve) Hours., Disp: , Rfl:     atorvastatin (LIPITOR) 10 MG tablet, Take 1 tablet by mouth Daily. [facility]ensed [facility], Disp: , Rfl:     digoxin (LANOXIN) 125 MCG tablet, Take 1 tablet by mouth Daily., Disp: 90 tablet, Rfl: 3    empagliflozin (Jardiance) 25 MG tablet tablet, Take 1 tablet by mouth Daily., Disp: 30 tablet, Rfl: 11    furosemide (LASIX) 40 MG tablet, Take 1 tablet by mouth twice daily, Disp: 180 tablet, Rfl: 1    glipizide (GLUCOTROL) 10 MG tablet, Take 1 tablet by mouth 2 (Two) Times a Day Before Meals., Disp: 180 tablet, Rfl: 1    lisinopril (PRINIVIL,ZESTRIL) 20 MG tablet, Take 1 tablet by mouth once daily, Disp: 90 tablet, Rfl: 0    omeprazole OTC (PriLOSEC OTC) 20 MG EC tablet, Take 1 tablet by mouth Daily As Needed., Disp: , Rfl:     potassium chloride (K-DUR,KLOR-CON) 20 MEQ CR tablet, Take 1 tablet by mouth Daily., Disp: 30 tablet, Rfl: 6     simvastatin (ZOCOR) 20 MG tablet, Take 1 tablet by mouth Every Evening., Disp: , Rfl: 3    metoprolol succinate XL (TOPROL-XL) 200 MG 24 hr tablet, Take 1 tablet by mouth 2 (Two) Times a Day., Disp: 180 tablet, Rfl: 0    Mirabegron ER (Myrbetriq) 25 MG tablet sustained-release 24 hour 24 hr tablet, Take 1 tablet by mouth Daily., Disp: 30 tablet, Rfl: 6    Semaglutide,0.25 or 0.5MG/DOS, (OZEMPIC) 2 MG/1.5ML solution pen-injector, Inject 0.25 mg under the skin into the appropriate area as directed 1 (One) Time Per Week. Inject 0.25mg under the skin into the appropriate area as directed 1 (one) time per week, then 0.5 weekly afterwards., Disp: 1.5 mL, Rfl: 2    Past Medical History:   Diagnosis Date    Arrhythmia     Atrial fibrillation     Benign prostatic hyperplasia     Cataract     25 years ago    CHF (congestive heart failure)     Diabetes mellitus     Hyperlipidemia     Hypertension        Past Surgical History:   Procedure Laterality Date    CARDIAC CATHETERIZATION N/A 10/21/2019    Procedure: Right and Left Heart Cath;  Surgeon: Ashley Walker MD;  Location: [facility] CATH INVASIVE LOCATION;  Service: Cardiovascular    CARDIAC CATHETERIZATION N/A 10/21/2019    Procedure: Coronary angiography;  Surgeon: Ashley Walker MD;  Location: [facility] CATH INVASIVE LOCATION;  Service: Cardiovascular    CARDIAC CATHETERIZATION N/A 10/21/2019    Procedure: Left ventriculography- pressures only- no lv injection;  Surgeon: Ashley Walker MD;  Location: [facility] CATH INVASIVE LOCATION;  Service: Cardiovascular    CATARACT EXTRACTION WITH INTRAOCULAR LENS IMPLANT         Family History   Problem Relation Age of Onset    Heart disease Mother     Heart attack Mother     Heart attack Brother        Social History     Tobacco Use    Smoking status: Former     Packs/day: 0.00     Years: 0.00     Additional pack years: 0.00     Total pack years: 0.00     Types: Cigarettes     Quit date: 1982     Years since quittin.8     "Smokeless tobacco: Never    Tobacco comments:     caffeine use - decaf coffee   Vaping Use    Vaping Use: Never used   Substance Use Topics    Alcohol use: Yes     Comment: Socially    Drug use: Never         ECG 12 Lead    Date/Time: 10/30/2023 10:30 AM  Performed by: Avelina Smith APRN    Authorized by: Avelina Smith APRN  Comparison: compared with previous ECG from 4/28/2023  Similar to previous ECG  Rhythm: atrial fibrillation  BPM: 82  Comments: No significant change from previous EKG             Objective:     Visit Vitals  /78   Pulse 82   Ht 167.6 cm (66\")   Wt 115 kg (253 lb)   BMI 40.84 kg/m²             Physical Exam  Constitutional:       Appearance: Normal appearance. He is obese.   HENT:      Head: Normocephalic.   Neck:      Vascular: No carotid bruit.   Cardiovascular:      Rate and Rhythm: Normal rate. Rhythm irregular and rhythm irregularly irregular.      Chest Wall: PMI is not displaced.      Pulses: Normal pulses.      Heart sounds: No murmur heard.     No friction rub. No gallop.   Pulmonary:      Effort: Pulmonary effort is normal.      Breath sounds: Normal breath sounds.   Abdominal:      General: Bowel sounds are normal. There is no distension.      Palpations: Abdomen is soft.   Musculoskeletal:      Right lower leg: No edema.      Left lower leg: No edema.   Skin:     General: Skin is warm and dry.      Capillary Refill: Capillary refill takes less than 2 seconds.   Neurological:      Mental Status: He is alert and oriented to person, place, and time.   Psychiatric:         Mood and Affect: Mood normal.         Behavior: Behavior normal.         Thought Content: Thought content normal.          Lab Review:   Lipid Panel          3/17/2023    07:40 9/13/2023    08:10   Lipid Panel   Total Cholesterol 188  177    Triglycerides 156  93    HDL Cholesterol 46  78    VLDL Cholesterol 28  17    LDL Cholesterol  114  82    LDL/HDL Ratio 2.41  1.03          Cardiac Procedures: "       Assessment:         Diagnoses and all orders for this visit:    1. Atrial fibrillation, persistent (Primary)    2. Chronic combined systolic and diastolic congestive heart failure    3. Primary hypertension    4. NICM (nonischemic cardiomyopathy)    5. Hypercholesterolemia    6. NSVT (nonsustained ventricular tachycardia)    Other orders  -     ECG 12 Lead            Plan:       Chronic atrial fibrillation: rates well controlled on Toprol XL. Anticoagulated with apixaban and no reported bleeding issues. Continue with current medical management.  Chronic combined systolic and diastolic CHF: appears compensated on exam today. On furosemide. No changes  Nonischemic cardiomyopathy: on GDMT with Toprol XL, lisinopril and Jardiance. Last EF in August 2023 was 43%. Continue with current therapy.  HTN: blood pressure well controlled. No changes  HLD: recently switched from simvastatin 20 mg to atorvastatin 10 mg by PCP. Not entirely sure why. Last lipid panel in September looked good. Plans for repeat labs in December.      Thank you for allowing me to participate in this patient's care. Please call with any questions or concerns. Mr. Chen will follow up with Dr. Montemayor in 6 months.          Your medication list            Accurate as of October 30, 2023 10:32 AM. If you have any questions, ask your nurse or doctor.                CONTINUE taking these medications        Instructions Last Dose Given Next Dose Due   Alogliptin Benzoate 25 MG tablet      Take 1 tablet by mouth. [facility]ensed [facility]       apixaban 5 MG tablet tablet  Commonly known as: ELIQUIS      Take 1 tablet by mouth Every 12 (Twelve) Hours.       atorvastatin 10 MG tablet  Commonly known as: LIPITOR      Take 1 tablet by mouth Daily. [facility]ensed [facility]       digoxin 125 MCG tablet  Commonly known as: LANOXIN      Take 1 tablet by mouth Daily.       empagliflozin 25 MG tablet tablet  Commonly known as: Jardiance      Take 1 tablet by mouth Daily.        furosemide 40 MG tablet  Commonly known as: LASIX      Take 1 tablet by mouth twice daily       glipizide 10 MG tablet  Commonly known as: GLUCOTROL      Take 1 tablet by mouth 2 (Two) Times a Day Before Meals.       lisinopril 20 MG tablet  Commonly known as: PRINIVIL,ZESTRIL      Take 1 tablet by mouth once daily       metoprolol succinate  MG 24 hr tablet  Commonly known as: TOPROL-XL      Take 1 tablet by mouth 2 (Two) Times a Day.       Mirabegron ER 25 MG tablet sustained-release 24 hour 24 hr tablet  Commonly known as: Myrbetriq      Take 1 tablet by mouth Daily.       omeprazole OTC 20 MG EC tablet  Commonly known as: PriLOSEC OTC      Take 1 tablet by mouth Daily As Needed.       potassium chloride 20 MEQ CR tablet  Commonly known as: K-DUR,KLOR-CON      Take 1 tablet by mouth Daily.       Semaglutide(0.25 or 0.5MG/DOS) 2 MG/1.5ML solution pen-injector  Commonly known as: OZEMPIC      Inject 0.25 mg under the skin into the appropriate area as directed 1 (One) Time Per Week. Inject 0.25mg under the skin into the appropriate area as directed 1 (one) time per week, then 0.5 weekly afterwards.       simvastatin 20 MG tablet  Commonly known as: ZOCOR      Take 1 tablet by mouth Every Evening.                  AYDEE Holder  10/30/23  8:53 AM EDT

## 2025-02-25 RX ORDER — FUROSEMIDE 40 MG/1
40 TABLET ORAL 2 TIMES DAILY
Qty: 180 TABLET | Refills: 0 | Status: SHIPPED | OUTPATIENT
Start: 2025-02-25

## 2025-02-25 NOTE — TELEPHONE ENCOUNTER
Rx Refill Note  Requested Prescriptions     Pending Prescriptions Disp Refills    furosemide (LASIX) 40 MG tablet [Pharmacy Med Name: Furosemide 40 MG Oral Tablet] 180 tablet 0     Sig: Take 1 tablet by mouth twice daily      Last office visit with prescribing clinician: 3/13/2024       Analy Judd MA  02/25/25, 09:03 EST

## 2025-03-22 DIAGNOSIS — I10 ESSENTIAL HYPERTENSION: ICD-10-CM

## 2025-03-24 RX ORDER — LISINOPRIL 20 MG/1
20 TABLET ORAL DAILY
Qty: 90 TABLET | Refills: 0 | Status: SHIPPED | OUTPATIENT
Start: 2025-03-24

## 2025-04-09 DIAGNOSIS — E11.59 TYPE 2 DIABETES MELLITUS WITH OTHER CIRCULATORY COMPLICATION, WITHOUT LONG-TERM CURRENT USE OF INSULIN: ICD-10-CM

## 2025-04-11 RX ORDER — GLIPIZIDE 10 MG/1
10 TABLET ORAL
Qty: 180 TABLET | Refills: 0 | OUTPATIENT
Start: 2025-04-11

## 2025-04-11 NOTE — TELEPHONE ENCOUNTER
Rx Refill Note  Requested Prescriptions     Pending Prescriptions Disp Refills    glipizide (GLUCOTROL) 10 MG tablet [Pharmacy Med Name: glipiZIDE 10 MG Oral Tablet] 180 tablet 0     Sig: TAKE 1 TABLET BY MOUTH TWICE DAILY BEFORE MEAL(S)      Last office visit with prescribing clinician: 3/13/2024   Last telemedicine visit with prescribing clinician: Visit date not found   Next office visit with prescribing clinician: Visit date not found       Leda Schaefer  04/11/25, 10:57 EDT

## 2025-07-24 DIAGNOSIS — I10 ESSENTIAL HYPERTENSION: ICD-10-CM

## 2025-07-24 RX ORDER — LISINOPRIL 20 MG/1
20 TABLET ORAL DAILY
Qty: 90 TABLET | Refills: 0 | OUTPATIENT
Start: 2025-07-24

## 2025-08-14 DIAGNOSIS — I10 ESSENTIAL HYPERTENSION: ICD-10-CM

## 2025-08-14 RX ORDER — LISINOPRIL 20 MG/1
20 TABLET ORAL DAILY
Qty: 90 TABLET | Refills: 0 | OUTPATIENT
Start: 2025-08-14

## (undated) DEVICE — GW EMR FIX EXCHG J STD .035 3MM 260CM

## (undated) DEVICE — CATH DIAG IMPULSE FR4 5F 100CM

## (undated) DEVICE — PK CATH CARD 40

## (undated) DEVICE — HI-TORQUE BALANCE MIDDLEWEIGHT GUIDE WIRE .014 STRAIGHT TIP 3.0 CM X 190 CM: Brand: HI-TORQUE BALANCE MIDDLEWEIGHT

## (undated) DEVICE — CATH VENT MIV RADL PIG ST TIP 5F 110CM

## (undated) DEVICE — GLIDESHEATH SLENDER STAINLESS STEEL KIT: Brand: GLIDESHEATH SLENDER

## (undated) DEVICE — GLIDESHEATH BASIC HYDROPHILIC COATED INTRODUCER SHEATH: Brand: GLIDESHEATH

## (undated) DEVICE — BALN PRESS WEDGE 5F 110CM

## (undated) DEVICE — KT MANIFLD CARDIAC

## (undated) DEVICE — CATH DIAG IMPULSE FL3.5 5F 100CM